# Patient Record
Sex: FEMALE | Race: WHITE | NOT HISPANIC OR LATINO | Employment: OTHER | URBAN - METROPOLITAN AREA
[De-identification: names, ages, dates, MRNs, and addresses within clinical notes are randomized per-mention and may not be internally consistent; named-entity substitution may affect disease eponyms.]

---

## 2017-01-22 ENCOUNTER — COMMUNICATION - HEALTHEAST (OUTPATIENT)
Dept: INTERNAL MEDICINE | Facility: CLINIC | Age: 72
End: 2017-01-22

## 2017-01-22 DIAGNOSIS — E78.5 HYPERLIPIDEMIA: ICD-10-CM

## 2017-01-31 ENCOUNTER — COMMUNICATION - HEALTHEAST (OUTPATIENT)
Dept: INTERNAL MEDICINE | Facility: CLINIC | Age: 72
End: 2017-01-31

## 2017-01-31 DIAGNOSIS — I10 HYPERTENSION: ICD-10-CM

## 2017-01-31 DIAGNOSIS — I25.10 CAD (CORONARY ARTERY DISEASE): ICD-10-CM

## 2017-02-01 ENCOUNTER — COMMUNICATION - HEALTHEAST (OUTPATIENT)
Dept: INTERNAL MEDICINE | Facility: CLINIC | Age: 72
End: 2017-02-01

## 2017-02-01 DIAGNOSIS — E55.9 VITAMIN D DEFICIENCY: ICD-10-CM

## 2017-02-01 DIAGNOSIS — E03.9 HYPOTHYROIDISM: ICD-10-CM

## 2017-02-01 DIAGNOSIS — E78.5 HYPERLIPEMIA: ICD-10-CM

## 2017-02-01 DIAGNOSIS — I10 ESSENTIAL HYPERTENSION: ICD-10-CM

## 2017-02-07 ENCOUNTER — AMBULATORY - HEALTHEAST (OUTPATIENT)
Dept: LAB | Facility: CLINIC | Age: 72
End: 2017-02-07

## 2017-02-07 ENCOUNTER — OFFICE VISIT - HEALTHEAST (OUTPATIENT)
Dept: INTERNAL MEDICINE | Facility: CLINIC | Age: 72
End: 2017-02-07

## 2017-02-07 DIAGNOSIS — Z78.0 MENOPAUSE: ICD-10-CM

## 2017-02-07 DIAGNOSIS — E03.9 HYPOTHYROIDISM: ICD-10-CM

## 2017-02-07 DIAGNOSIS — Z00.00 ROUTINE GENERAL MEDICAL EXAMINATION AT A HEALTH CARE FACILITY: ICD-10-CM

## 2017-02-07 DIAGNOSIS — E55.9 VITAMIN D DEFICIENCY: ICD-10-CM

## 2017-02-07 DIAGNOSIS — E78.5 HYPERLIPEMIA: ICD-10-CM

## 2017-02-07 DIAGNOSIS — I25.10 CORONARY ATHEROSCLEROSIS: ICD-10-CM

## 2017-02-07 DIAGNOSIS — Z12.31 VISIT FOR SCREENING MAMMOGRAM: ICD-10-CM

## 2017-02-07 DIAGNOSIS — I10 ESSENTIAL HYPERTENSION: ICD-10-CM

## 2017-02-07 DIAGNOSIS — N95.0 POSTMENOPAUSAL BLEEDING: ICD-10-CM

## 2017-02-07 LAB
CHOLEST SERPL-MCNC: 145 MG/DL
FASTING STATUS PATIENT QL REPORTED: YES
HDLC SERPL-MCNC: 61 MG/DL
LDLC SERPL CALC-MCNC: 72 MG/DL
TRIGL SERPL-MCNC: 59 MG/DL

## 2017-02-07 ASSESSMENT — MIFFLIN-ST. JEOR: SCORE: 1076.04

## 2017-02-08 ENCOUNTER — COMMUNICATION - HEALTHEAST (OUTPATIENT)
Dept: INTERNAL MEDICINE | Facility: CLINIC | Age: 72
End: 2017-02-08

## 2017-02-10 ENCOUNTER — COMMUNICATION - HEALTHEAST (OUTPATIENT)
Dept: INTERNAL MEDICINE | Facility: CLINIC | Age: 72
End: 2017-02-10

## 2017-02-16 ENCOUNTER — COMMUNICATION - HEALTHEAST (OUTPATIENT)
Dept: INTERNAL MEDICINE | Facility: CLINIC | Age: 72
End: 2017-02-16

## 2017-02-21 ENCOUNTER — COMMUNICATION - HEALTHEAST (OUTPATIENT)
Dept: INTERNAL MEDICINE | Facility: CLINIC | Age: 72
End: 2017-02-21

## 2017-02-21 DIAGNOSIS — Z00.00 HEALTH CARE MAINTENANCE: ICD-10-CM

## 2017-02-22 ENCOUNTER — COMMUNICATION - HEALTHEAST (OUTPATIENT)
Dept: INTERNAL MEDICINE | Facility: CLINIC | Age: 72
End: 2017-02-22

## 2017-02-22 DIAGNOSIS — E78.5 HYPERLIPIDEMIA: ICD-10-CM

## 2017-03-17 ENCOUNTER — COMMUNICATION - HEALTHEAST (OUTPATIENT)
Dept: INTERNAL MEDICINE | Facility: CLINIC | Age: 72
End: 2017-03-17

## 2017-03-17 DIAGNOSIS — E03.9 HYPOTHYROID: ICD-10-CM

## 2017-04-13 ENCOUNTER — RECORDS - HEALTHEAST (OUTPATIENT)
Dept: BONE DENSITY | Facility: CLINIC | Age: 72
End: 2017-04-13

## 2017-04-13 ENCOUNTER — HOSPITAL ENCOUNTER (OUTPATIENT)
Dept: MAMMOGRAPHY | Facility: CLINIC | Age: 72
Discharge: HOME | End: 2017-04-13
Attending: INTERNAL MEDICINE

## 2017-04-13 DIAGNOSIS — Z12.31 VISIT FOR SCREENING MAMMOGRAM: ICD-10-CM

## 2017-04-13 DIAGNOSIS — Z78.0 ASYMPTOMATIC MENOPAUSAL STATE: ICD-10-CM

## 2017-04-17 ENCOUNTER — RECORDS - HEALTHEAST (OUTPATIENT)
Dept: ADMINISTRATIVE | Facility: OTHER | Age: 72
End: 2017-04-17

## 2017-04-18 ENCOUNTER — COMMUNICATION - HEALTHEAST (OUTPATIENT)
Dept: INTERNAL MEDICINE | Facility: CLINIC | Age: 72
End: 2017-04-18

## 2017-05-05 ENCOUNTER — RECORDS - HEALTHEAST (OUTPATIENT)
Dept: ADMINISTRATIVE | Facility: OTHER | Age: 72
End: 2017-05-05

## 2017-06-06 ENCOUNTER — OFFICE VISIT - HEALTHEAST (OUTPATIENT)
Dept: CARDIOLOGY | Facility: CLINIC | Age: 72
End: 2017-06-06

## 2017-06-06 DIAGNOSIS — R06.09 OTHER FORMS OF DYSPNEA: ICD-10-CM

## 2017-06-06 DIAGNOSIS — R06.09 DYSPNEA ON EXERTION: ICD-10-CM

## 2017-06-06 ASSESSMENT — MIFFLIN-ST. JEOR: SCORE: 1079.21

## 2017-06-07 ENCOUNTER — COMMUNICATION - HEALTHEAST (OUTPATIENT)
Dept: INTERNAL MEDICINE | Facility: CLINIC | Age: 72
End: 2017-06-07

## 2017-06-09 ENCOUNTER — HOSPITAL ENCOUNTER (OUTPATIENT)
Dept: CARDIOLOGY | Facility: CLINIC | Age: 72
Discharge: HOME | End: 2017-06-09
Attending: INTERNAL MEDICINE

## 2017-06-09 ENCOUNTER — HOSPITAL ENCOUNTER (OUTPATIENT)
Dept: NUCLEAR MEDICINE | Facility: CLINIC | Age: 72
Discharge: HOME | End: 2017-06-09
Attending: INTERNAL MEDICINE

## 2017-06-09 LAB
CV STRESS CURRENT BP HE: NORMAL
CV STRESS CURRENT HR HE: 101
CV STRESS CURRENT HR HE: 103
CV STRESS CURRENT HR HE: 103
CV STRESS CURRENT HR HE: 109
CV STRESS CURRENT HR HE: 109
CV STRESS CURRENT HR HE: 115
CV STRESS CURRENT HR HE: 116
CV STRESS CURRENT HR HE: 118
CV STRESS CURRENT HR HE: 121
CV STRESS CURRENT HR HE: 128
CV STRESS CURRENT HR HE: 129
CV STRESS CURRENT HR HE: 132
CV STRESS CURRENT HR HE: 132
CV STRESS CURRENT HR HE: 134
CV STRESS CURRENT HR HE: 67
CV STRESS CURRENT HR HE: 72
CV STRESS CURRENT HR HE: 74
CV STRESS CURRENT HR HE: 76
CV STRESS CURRENT HR HE: 77
CV STRESS CURRENT HR HE: 77
CV STRESS CURRENT HR HE: 80
CV STRESS CURRENT HR HE: 81
CV STRESS CURRENT HR HE: 83
CV STRESS CURRENT HR HE: 83
CV STRESS CURRENT HR HE: 85
CV STRESS CURRENT HR HE: 87
CV STRESS CURRENT HR HE: 92
CV STRESS CURRENT HR HE: 96
CV STRESS CURRENT HR HE: 97
CV STRESS DEVIATION TIME HE: NORMAL
CV STRESS ECHO PERCENT HR HE: NORMAL
CV STRESS EXERCISE STAGE HE: NORMAL
CV STRESS EXERCISE STAGE REACHED HE: NORMAL
CV STRESS FINAL RESTING BP HE: NORMAL
CV STRESS FINAL RESTING HR HE: 74
CV STRESS MAX HR HE: 135
CV STRESS MAX TREADMILL GRADE HE: 16
CV STRESS MAX TREADMILL SPEED HE: 4.2
CV STRESS PEAK DIA BP HE: NORMAL
CV STRESS PEAK SYS BP HE: NORMAL
CV STRESS PHASE HE: NORMAL
CV STRESS PROTOCOL HE: NORMAL
CV STRESS RESTING PT POSITION HE: NORMAL
CV STRESS ST DEVIATION AMOUNT HE: NORMAL
CV STRESS ST DEVIATION ELEVATION HE: NORMAL
CV STRESS ST EVELATION AMOUNT HE: NORMAL
CV STRESS TEST TYPE HE: NORMAL
CV STRESS TOTAL STAGE TIME MIN 1 HE: NORMAL
NUC STRESS EJECTION FRACTION: 70 %
STRESS ECHO BASELINE BP: NORMAL
STRESS ECHO BASELINE HR: 69
STRESS ECHO CALCULATED PERCENT HR: 90 %
STRESS ECHO LAST STRESS BP: NORMAL
STRESS ECHO LAST STRESS HR: 134
STRESS ECHO POST ESTIMATED WORKLOAD: 12.1
STRESS ECHO POST EXERCISE DUR MIN: 10
STRESS ECHO POST EXERCISE DUR SEC: 38
STRESS ECHO TARGET HR: 128

## 2017-09-11 ENCOUNTER — COMMUNICATION - HEALTHEAST (OUTPATIENT)
Dept: INTERNAL MEDICINE | Facility: CLINIC | Age: 72
End: 2017-09-11

## 2017-09-11 DIAGNOSIS — B00.9 HERPES SIMPLEX WITHOUT MENTION OF COMPLICATION: ICD-10-CM

## 2017-11-13 ENCOUNTER — COMMUNICATION - HEALTHEAST (OUTPATIENT)
Dept: INTERNAL MEDICINE | Facility: CLINIC | Age: 72
End: 2017-11-13

## 2017-11-13 DIAGNOSIS — B00.9 HERPES SIMPLEX VIRUS (HSV) INFECTION: ICD-10-CM

## 2017-12-17 ENCOUNTER — COMMUNICATION - HEALTHEAST (OUTPATIENT)
Dept: INTERNAL MEDICINE | Facility: CLINIC | Age: 72
End: 2017-12-17

## 2017-12-17 DIAGNOSIS — B00.9 HERPES SIMPLEX VIRUS (HSV) INFECTION: ICD-10-CM

## 2017-12-18 ENCOUNTER — COMMUNICATION - HEALTHEAST (OUTPATIENT)
Dept: INTERNAL MEDICINE | Facility: CLINIC | Age: 72
End: 2017-12-18

## 2017-12-18 DIAGNOSIS — Z00.00 HEALTH CARE MAINTENANCE: ICD-10-CM

## 2018-01-24 ENCOUNTER — COMMUNICATION - HEALTHEAST (OUTPATIENT)
Dept: INTERNAL MEDICINE | Facility: CLINIC | Age: 73
End: 2018-01-24

## 2018-01-24 DIAGNOSIS — I25.10 CAD (CORONARY ARTERY DISEASE): ICD-10-CM

## 2018-01-24 DIAGNOSIS — I10 HYPERTENSION: ICD-10-CM

## 2018-02-07 ENCOUNTER — COMMUNICATION - HEALTHEAST (OUTPATIENT)
Dept: INTERNAL MEDICINE | Facility: CLINIC | Age: 73
End: 2018-02-07

## 2018-02-07 DIAGNOSIS — E78.5 HYPERLIPIDEMIA: ICD-10-CM

## 2018-03-11 ENCOUNTER — COMMUNICATION - HEALTHEAST (OUTPATIENT)
Dept: INTERNAL MEDICINE | Facility: CLINIC | Age: 73
End: 2018-03-11

## 2018-03-11 DIAGNOSIS — E03.9 HYPOTHYROID: ICD-10-CM

## 2018-04-28 ENCOUNTER — COMMUNICATION - HEALTHEAST (OUTPATIENT)
Dept: INTERNAL MEDICINE | Facility: CLINIC | Age: 73
End: 2018-04-28

## 2018-04-28 DIAGNOSIS — I10 HYPERTENSION: ICD-10-CM

## 2018-04-28 DIAGNOSIS — I25.10 CAD (CORONARY ARTERY DISEASE): ICD-10-CM

## 2018-05-09 ENCOUNTER — RECORDS - HEALTHEAST (OUTPATIENT)
Dept: ADMINISTRATIVE | Facility: OTHER | Age: 73
End: 2018-05-09

## 2018-05-15 ENCOUNTER — RECORDS - HEALTHEAST (OUTPATIENT)
Dept: ADMINISTRATIVE | Facility: OTHER | Age: 73
End: 2018-05-15

## 2018-05-24 ENCOUNTER — RECORDS - HEALTHEAST (OUTPATIENT)
Dept: GENERAL RADIOLOGY | Facility: CLINIC | Age: 73
End: 2018-05-24

## 2018-05-24 ENCOUNTER — OFFICE VISIT - HEALTHEAST (OUTPATIENT)
Dept: INTERNAL MEDICINE | Facility: CLINIC | Age: 73
End: 2018-05-24

## 2018-05-24 DIAGNOSIS — I10 ESSENTIAL HYPERTENSION: ICD-10-CM

## 2018-05-24 DIAGNOSIS — M94.9 DISORDER OF BONE AND CARTILAGE: ICD-10-CM

## 2018-05-24 DIAGNOSIS — I25.10 CORONARY ATHEROSCLEROSIS: ICD-10-CM

## 2018-05-24 DIAGNOSIS — M54.50 LOW BACK PAIN: ICD-10-CM

## 2018-05-24 DIAGNOSIS — M89.9 DISORDER OF BONE AND CARTILAGE: ICD-10-CM

## 2018-05-24 DIAGNOSIS — E03.9 HYPOTHYROIDISM: ICD-10-CM

## 2018-05-24 DIAGNOSIS — Z00.00 ROUTINE GENERAL MEDICAL EXAMINATION AT A HEALTH CARE FACILITY: ICD-10-CM

## 2018-05-24 DIAGNOSIS — E55.9 VITAMIN D DEFICIENCY: ICD-10-CM

## 2018-05-24 DIAGNOSIS — E78.5 HYPERLIPEMIA: ICD-10-CM

## 2018-05-24 LAB
ALBUMIN SERPL-MCNC: 3.9 G/DL (ref 3.5–5)
ALP SERPL-CCNC: 70 U/L (ref 45–120)
ALT SERPL W P-5'-P-CCNC: 18 U/L (ref 0–45)
ANION GAP SERPL CALCULATED.3IONS-SCNC: 13 MMOL/L (ref 5–18)
AST SERPL W P-5'-P-CCNC: 26 U/L (ref 0–40)
BILIRUB SERPL-MCNC: 0.8 MG/DL (ref 0–1)
BUN SERPL-MCNC: 21 MG/DL (ref 8–28)
CALCIUM SERPL-MCNC: 10.3 MG/DL (ref 8.5–10.5)
CHLORIDE BLD-SCNC: 103 MMOL/L (ref 98–107)
CHOLEST SERPL-MCNC: 153 MG/DL
CO2 SERPL-SCNC: 26 MMOL/L (ref 22–31)
CREAT SERPL-MCNC: 1.03 MG/DL (ref 0.6–1.1)
FASTING STATUS PATIENT QL REPORTED: YES
GFR SERPL CREATININE-BSD FRML MDRD: 53 ML/MIN/1.73M2
GLUCOSE BLD-MCNC: 80 MG/DL (ref 70–125)
HDLC SERPL-MCNC: 61 MG/DL
LDLC SERPL CALC-MCNC: 79 MG/DL
POTASSIUM BLD-SCNC: 4.9 MMOL/L (ref 3.5–5)
PROT SERPL-MCNC: 7.2 G/DL (ref 6–8)
SODIUM SERPL-SCNC: 142 MMOL/L (ref 136–145)
TRIGL SERPL-MCNC: 64 MG/DL
TSH SERPL DL<=0.005 MIU/L-ACNC: 0.7 UIU/ML (ref 0.3–5)

## 2018-05-24 RX ORDER — ZINC GLUCONATE 50 MG
50 TABLET ORAL DAILY
Status: SHIPPED | COMMUNITY
Start: 2018-05-24 | End: 2023-01-12

## 2018-05-24 ASSESSMENT — MIFFLIN-ST. JEOR: SCORE: 1070.14

## 2018-05-25 LAB
25(OH)D3 SERPL-MCNC: 69.5 NG/ML (ref 30–80)
25(OH)D3 SERPL-MCNC: 69.5 NG/ML (ref 30–80)

## 2018-05-29 ENCOUNTER — COMMUNICATION - HEALTHEAST (OUTPATIENT)
Dept: INTERNAL MEDICINE | Facility: CLINIC | Age: 73
End: 2018-05-29

## 2018-05-30 ENCOUNTER — COMMUNICATION - HEALTHEAST (OUTPATIENT)
Dept: INTERNAL MEDICINE | Facility: CLINIC | Age: 73
End: 2018-05-30

## 2018-05-30 DIAGNOSIS — M85.80 OSTEOPENIA: ICD-10-CM

## 2018-05-31 ENCOUNTER — COMMUNICATION - HEALTHEAST (OUTPATIENT)
Dept: ADMINISTRATIVE | Facility: CLINIC | Age: 73
End: 2018-05-31

## 2018-06-13 ENCOUNTER — COMMUNICATION - HEALTHEAST (OUTPATIENT)
Dept: INTERNAL MEDICINE | Facility: CLINIC | Age: 73
End: 2018-06-13

## 2018-06-15 ENCOUNTER — COMMUNICATION - HEALTHEAST (OUTPATIENT)
Dept: INTERNAL MEDICINE | Facility: CLINIC | Age: 73
End: 2018-06-15

## 2018-06-15 DIAGNOSIS — E03.9 HYPOTHYROID: ICD-10-CM

## 2018-07-05 ENCOUNTER — OFFICE VISIT - HEALTHEAST (OUTPATIENT)
Dept: CARDIOLOGY | Facility: CLINIC | Age: 73
End: 2018-07-05

## 2018-07-05 DIAGNOSIS — I25.10 CORONARY ARTERY DISEASE INVOLVING NATIVE CORONARY ARTERY OF NATIVE HEART WITHOUT ANGINA PECTORIS: ICD-10-CM

## 2018-07-05 ASSESSMENT — MIFFLIN-ST. JEOR: SCORE: 1083.75

## 2018-07-25 ENCOUNTER — COMMUNICATION - HEALTHEAST (OUTPATIENT)
Dept: INTERNAL MEDICINE | Facility: CLINIC | Age: 73
End: 2018-07-25

## 2018-07-25 DIAGNOSIS — I25.10 CAD (CORONARY ARTERY DISEASE): ICD-10-CM

## 2018-07-25 DIAGNOSIS — I10 HYPERTENSION: ICD-10-CM

## 2018-08-09 ENCOUNTER — COMMUNICATION - HEALTHEAST (OUTPATIENT)
Dept: INTERNAL MEDICINE | Facility: CLINIC | Age: 73
End: 2018-08-09

## 2018-10-16 ENCOUNTER — COMMUNICATION - HEALTHEAST (OUTPATIENT)
Dept: INTERNAL MEDICINE | Facility: CLINIC | Age: 73
End: 2018-10-16

## 2018-11-15 ENCOUNTER — RECORDS - HEALTHEAST (OUTPATIENT)
Dept: ADMINISTRATIVE | Facility: OTHER | Age: 73
End: 2018-11-15

## 2018-12-04 ENCOUNTER — OFFICE VISIT - HEALTHEAST (OUTPATIENT)
Dept: INTERNAL MEDICINE | Facility: CLINIC | Age: 73
End: 2018-12-04

## 2018-12-04 DIAGNOSIS — M89.9 DISORDER OF BONE AND CARTILAGE: ICD-10-CM

## 2018-12-04 DIAGNOSIS — M94.9 DISORDER OF BONE AND CARTILAGE: ICD-10-CM

## 2018-12-04 ASSESSMENT — MIFFLIN-ST. JEOR: SCORE: 1085.68

## 2018-12-26 ENCOUNTER — HOSPITAL ENCOUNTER (OUTPATIENT)
Dept: MAMMOGRAPHY | Facility: CLINIC | Age: 73
Discharge: HOME | End: 2018-12-26
Attending: INTERNAL MEDICINE

## 2018-12-26 DIAGNOSIS — Z12.31 VISIT FOR SCREENING MAMMOGRAM: ICD-10-CM

## 2019-01-14 ENCOUNTER — COMMUNICATION - HEALTHEAST (OUTPATIENT)
Dept: INTERNAL MEDICINE | Facility: CLINIC | Age: 74
End: 2019-01-14

## 2019-01-14 DIAGNOSIS — B00.9 HERPES SIMPLEX VIRUS (HSV) INFECTION: ICD-10-CM

## 2019-01-28 ENCOUNTER — COMMUNICATION - HEALTHEAST (OUTPATIENT)
Dept: INTERNAL MEDICINE | Facility: CLINIC | Age: 74
End: 2019-01-28

## 2019-01-29 ENCOUNTER — AMBULATORY - HEALTHEAST (OUTPATIENT)
Dept: INTERNAL MEDICINE | Facility: CLINIC | Age: 74
End: 2019-01-29

## 2019-01-29 DIAGNOSIS — N95.0 POST-MENOPAUSAL BLEEDING: ICD-10-CM

## 2019-02-01 ENCOUNTER — HOSPITAL ENCOUNTER (OUTPATIENT)
Dept: ULTRASOUND IMAGING | Facility: CLINIC | Age: 74
Discharge: HOME | End: 2019-02-01
Attending: INTERNAL MEDICINE

## 2019-02-01 DIAGNOSIS — N95.0 POST-MENOPAUSAL BLEEDING: ICD-10-CM

## 2019-02-05 ENCOUNTER — COMMUNICATION - HEALTHEAST (OUTPATIENT)
Dept: INTERNAL MEDICINE | Facility: CLINIC | Age: 74
End: 2019-02-05

## 2019-02-05 DIAGNOSIS — N95.0 POST-MENOPAUSAL BLEEDING: ICD-10-CM

## 2019-02-18 ENCOUNTER — COMMUNICATION - HEALTHEAST (OUTPATIENT)
Dept: INTERNAL MEDICINE | Facility: CLINIC | Age: 74
End: 2019-02-18

## 2019-02-18 DIAGNOSIS — E78.5 HYPERLIPIDEMIA: ICD-10-CM

## 2019-02-19 ENCOUNTER — COMMUNICATION - HEALTHEAST (OUTPATIENT)
Dept: INTERNAL MEDICINE | Facility: CLINIC | Age: 74
End: 2019-02-19

## 2019-02-19 DIAGNOSIS — F32.A DEPRESSION: ICD-10-CM

## 2019-05-20 ENCOUNTER — COMMUNICATION - HEALTHEAST (OUTPATIENT)
Dept: INTERNAL MEDICINE | Facility: CLINIC | Age: 74
End: 2019-05-20

## 2019-05-20 DIAGNOSIS — E78.5 HYPERLIPIDEMIA: ICD-10-CM

## 2019-05-30 ENCOUNTER — OFFICE VISIT - HEALTHEAST (OUTPATIENT)
Dept: INTERNAL MEDICINE | Facility: CLINIC | Age: 74
End: 2019-05-30

## 2019-05-30 DIAGNOSIS — M94.9 DISORDER OF BONE AND CARTILAGE: ICD-10-CM

## 2019-05-30 DIAGNOSIS — M89.9 DISORDER OF BONE AND CARTILAGE: ICD-10-CM

## 2019-06-03 ENCOUNTER — COMMUNICATION - HEALTHEAST (OUTPATIENT)
Dept: INTERNAL MEDICINE | Facility: CLINIC | Age: 74
End: 2019-06-03

## 2019-06-03 DIAGNOSIS — F32.A DEPRESSION: ICD-10-CM

## 2019-06-17 ENCOUNTER — COMMUNICATION - HEALTHEAST (OUTPATIENT)
Dept: INTERNAL MEDICINE | Facility: CLINIC | Age: 74
End: 2019-06-17

## 2019-06-17 ENCOUNTER — OFFICE VISIT - HEALTHEAST (OUTPATIENT)
Dept: INTERNAL MEDICINE | Facility: CLINIC | Age: 74
End: 2019-06-17

## 2019-06-17 DIAGNOSIS — E03.9 HYPOTHYROID: ICD-10-CM

## 2019-06-17 DIAGNOSIS — I10 ESSENTIAL HYPERTENSION: ICD-10-CM

## 2019-06-17 DIAGNOSIS — M94.9 DISORDER OF BONE AND CARTILAGE: ICD-10-CM

## 2019-06-17 DIAGNOSIS — M89.9 DISORDER OF BONE AND CARTILAGE: ICD-10-CM

## 2019-06-17 DIAGNOSIS — I25.10 ATHEROSCLEROSIS OF NATIVE CORONARY ARTERY OF NATIVE HEART WITHOUT ANGINA PECTORIS: ICD-10-CM

## 2019-06-17 DIAGNOSIS — E78.5 HYPERLIPIDEMIA, UNSPECIFIED HYPERLIPIDEMIA TYPE: ICD-10-CM

## 2019-06-17 ASSESSMENT — MIFFLIN-ST. JEOR: SCORE: 1066.74

## 2019-06-25 ENCOUNTER — AMBULATORY - HEALTHEAST (OUTPATIENT)
Dept: LAB | Facility: CLINIC | Age: 74
End: 2019-06-25

## 2019-06-25 DIAGNOSIS — E78.5 HYPERLIPIDEMIA, UNSPECIFIED HYPERLIPIDEMIA TYPE: ICD-10-CM

## 2019-06-25 DIAGNOSIS — E03.9 HYPOTHYROID: ICD-10-CM

## 2019-06-25 DIAGNOSIS — I10 ESSENTIAL HYPERTENSION: ICD-10-CM

## 2019-06-25 LAB
ANION GAP SERPL CALCULATED.3IONS-SCNC: 10 MMOL/L (ref 5–18)
BUN SERPL-MCNC: 18 MG/DL (ref 8–28)
CALCIUM SERPL-MCNC: 10.2 MG/DL (ref 8.5–10.5)
CHLORIDE BLD-SCNC: 103 MMOL/L (ref 98–107)
CHOLEST SERPL-MCNC: 165 MG/DL
CO2 SERPL-SCNC: 27 MMOL/L (ref 22–31)
CREAT SERPL-MCNC: 0.86 MG/DL (ref 0.6–1.1)
ERYTHROCYTE [DISTWIDTH] IN BLOOD BY AUTOMATED COUNT: 11.2 % (ref 11–14.5)
FASTING STATUS PATIENT QL REPORTED: YES
GFR SERPL CREATININE-BSD FRML MDRD: >60 ML/MIN/1.73M2
GLUCOSE BLD-MCNC: 102 MG/DL (ref 70–125)
HCT VFR BLD AUTO: 41.7 % (ref 35–47)
HDLC SERPL-MCNC: 68 MG/DL
HGB BLD-MCNC: 14.2 G/DL (ref 12–16)
LDLC SERPL CALC-MCNC: 82 MG/DL
MCH RBC QN AUTO: 32.3 PG (ref 27–34)
MCHC RBC AUTO-ENTMCNC: 34 G/DL (ref 32–36)
MCV RBC AUTO: 95 FL (ref 80–100)
PLATELET # BLD AUTO: 232 THOU/UL (ref 140–440)
PMV BLD AUTO: 7.8 FL (ref 7–10)
POTASSIUM BLD-SCNC: 4.6 MMOL/L (ref 3.5–5)
RBC # BLD AUTO: 4.38 MILL/UL (ref 3.8–5.4)
SODIUM SERPL-SCNC: 140 MMOL/L (ref 136–145)
TRIGL SERPL-MCNC: 74 MG/DL
TSH SERPL DL<=0.005 MIU/L-ACNC: 1.26 UIU/ML (ref 0.3–5)
WBC: 4.5 THOU/UL (ref 4–11)

## 2019-06-27 ENCOUNTER — RECORDS - HEALTHEAST (OUTPATIENT)
Dept: BONE DENSITY | Facility: CLINIC | Age: 74
End: 2019-06-27

## 2019-06-27 ENCOUNTER — RECORDS - HEALTHEAST (OUTPATIENT)
Dept: ADMINISTRATIVE | Facility: OTHER | Age: 74
End: 2019-06-27

## 2019-06-27 DIAGNOSIS — M94.9 DISORDER OF CARTILAGE, UNSPECIFIED: ICD-10-CM

## 2019-06-27 DIAGNOSIS — M89.9 DISORDER OF BONE, UNSPECIFIED: ICD-10-CM

## 2019-08-01 ENCOUNTER — COMMUNICATION - HEALTHEAST (OUTPATIENT)
Dept: INTERNAL MEDICINE | Facility: CLINIC | Age: 74
End: 2019-08-01

## 2019-08-01 DIAGNOSIS — I25.10 CAD (CORONARY ARTERY DISEASE): ICD-10-CM

## 2019-08-01 DIAGNOSIS — I10 HYPERTENSION: ICD-10-CM

## 2019-08-02 ENCOUNTER — COMMUNICATION - HEALTHEAST (OUTPATIENT)
Dept: CARDIOLOGY | Facility: CLINIC | Age: 74
End: 2019-08-02

## 2019-08-22 ENCOUNTER — OFFICE VISIT - HEALTHEAST (OUTPATIENT)
Dept: CARDIOLOGY | Facility: CLINIC | Age: 74
End: 2019-08-22

## 2019-08-22 DIAGNOSIS — I25.10 CORONARY ARTERY DISEASE INVOLVING NATIVE CORONARY ARTERY OF NATIVE HEART WITHOUT ANGINA PECTORIS: ICD-10-CM

## 2019-08-22 ASSESSMENT — MIFFLIN-ST. JEOR: SCORE: 1071.27

## 2019-09-19 ENCOUNTER — COMMUNICATION - HEALTHEAST (OUTPATIENT)
Dept: INTERNAL MEDICINE | Facility: CLINIC | Age: 74
End: 2019-09-19

## 2019-09-19 DIAGNOSIS — M25.551 HIP PAIN, RIGHT: ICD-10-CM

## 2019-09-20 ENCOUNTER — COMMUNICATION - HEALTHEAST (OUTPATIENT)
Dept: SCHEDULING | Facility: CLINIC | Age: 74
End: 2019-09-20

## 2019-09-20 ENCOUNTER — COMMUNICATION - HEALTHEAST (OUTPATIENT)
Dept: INTERNAL MEDICINE | Facility: CLINIC | Age: 74
End: 2019-09-20

## 2019-10-10 ENCOUNTER — RECORDS - HEALTHEAST (OUTPATIENT)
Dept: ADMINISTRATIVE | Facility: OTHER | Age: 74
End: 2019-10-10

## 2019-10-22 ENCOUNTER — COMMUNICATION - HEALTHEAST (OUTPATIENT)
Dept: INTERNAL MEDICINE | Facility: CLINIC | Age: 74
End: 2019-10-22

## 2019-10-24 ENCOUNTER — RECORDS - HEALTHEAST (OUTPATIENT)
Dept: ADMINISTRATIVE | Facility: OTHER | Age: 74
End: 2019-10-24

## 2019-10-28 ENCOUNTER — OFFICE VISIT - HEALTHEAST (OUTPATIENT)
Dept: INTERNAL MEDICINE | Facility: CLINIC | Age: 74
End: 2019-10-28

## 2019-10-28 DIAGNOSIS — I25.10 ATHEROSCLEROSIS OF CORONARY ARTERY OF NATIVE HEART WITHOUT ANGINA PECTORIS, UNSPECIFIED VESSEL OR LESION TYPE: ICD-10-CM

## 2019-10-28 DIAGNOSIS — E78.5 HYPERLIPIDEMIA, UNSPECIFIED HYPERLIPIDEMIA TYPE: ICD-10-CM

## 2019-10-28 DIAGNOSIS — I10 ESSENTIAL HYPERTENSION: ICD-10-CM

## 2019-10-28 DIAGNOSIS — Z01.818 PREOP GENERAL PHYSICAL EXAM: ICD-10-CM

## 2019-10-28 DIAGNOSIS — M16.10 HIP ARTHRITIS: ICD-10-CM

## 2019-10-28 LAB
ANION GAP SERPL CALCULATED.3IONS-SCNC: 9 MMOL/L (ref 5–18)
ATRIAL RATE - MUSE: 61 BPM
BUN SERPL-MCNC: 17 MG/DL (ref 8–28)
CALCIUM SERPL-MCNC: 9.9 MG/DL (ref 8.5–10.5)
CHLORIDE BLD-SCNC: 104 MMOL/L (ref 98–107)
CO2 SERPL-SCNC: 27 MMOL/L (ref 22–31)
CREAT SERPL-MCNC: 0.88 MG/DL (ref 0.6–1.1)
DIASTOLIC BLOOD PRESSURE - MUSE: NORMAL
ERYTHROCYTE [DISTWIDTH] IN BLOOD BY AUTOMATED COUNT: 12 % (ref 11–14.5)
GFR SERPL CREATININE-BSD FRML MDRD: >60 ML/MIN/1.73M2
GLUCOSE BLD-MCNC: 101 MG/DL (ref 70–125)
HCT VFR BLD AUTO: 39.1 % (ref 35–47)
HGB BLD-MCNC: 13.6 G/DL (ref 12–16)
INTERPRETATION ECG - MUSE: NORMAL
MCH RBC QN AUTO: 32.5 PG (ref 27–34)
MCHC RBC AUTO-ENTMCNC: 34.7 G/DL (ref 32–36)
MCV RBC AUTO: 94 FL (ref 80–100)
P AXIS - MUSE: 60 DEGREES
PLATELET # BLD AUTO: 267 THOU/UL (ref 140–440)
PMV BLD AUTO: 7.6 FL (ref 7–10)
POTASSIUM BLD-SCNC: 4.5 MMOL/L (ref 3.5–5)
PR INTERVAL - MUSE: 180 MS
QRS DURATION - MUSE: 68 MS
QT - MUSE: 396 MS
QTC - MUSE: 398 MS
R AXIS - MUSE: 29 DEGREES
RBC # BLD AUTO: 4.17 MILL/UL (ref 3.8–5.4)
SODIUM SERPL-SCNC: 140 MMOL/L (ref 136–145)
SYSTOLIC BLOOD PRESSURE - MUSE: NORMAL
T AXIS - MUSE: 59 DEGREES
VENTRICULAR RATE- MUSE: 61 BPM
WBC: 4.1 THOU/UL (ref 4–11)

## 2019-10-28 ASSESSMENT — MIFFLIN-ST. JEOR: SCORE: 1075.81

## 2019-10-29 ENCOUNTER — COMMUNICATION - HEALTHEAST (OUTPATIENT)
Dept: INTERNAL MEDICINE | Facility: CLINIC | Age: 74
End: 2019-10-29

## 2019-11-08 ENCOUNTER — COMMUNICATION - HEALTHEAST (OUTPATIENT)
Dept: CARDIOLOGY | Facility: CLINIC | Age: 74
End: 2019-11-08

## 2019-11-12 ENCOUNTER — AMBULATORY - HEALTHEAST (OUTPATIENT)
Dept: OTHER | Facility: CLINIC | Age: 74
End: 2019-11-12

## 2019-11-12 ENCOUNTER — SURGERY - HEALTHEAST (OUTPATIENT)
Dept: SURGERY | Facility: CLINIC | Age: 74
End: 2019-11-12

## 2019-11-12 ENCOUNTER — ANESTHESIA - HEALTHEAST (OUTPATIENT)
Dept: SURGERY | Facility: CLINIC | Age: 74
End: 2019-11-12

## 2019-11-12 ASSESSMENT — MIFFLIN-ST. JEOR: SCORE: 1062.2

## 2019-11-25 ENCOUNTER — RECORDS - HEALTHEAST (OUTPATIENT)
Dept: ADMINISTRATIVE | Facility: OTHER | Age: 74
End: 2019-11-25

## 2019-11-26 ENCOUNTER — COMMUNICATION - HEALTHEAST (OUTPATIENT)
Dept: INTERNAL MEDICINE | Facility: CLINIC | Age: 74
End: 2019-11-26

## 2019-11-26 DIAGNOSIS — E78.5 HYPERLIPIDEMIA: ICD-10-CM

## 2019-12-02 ENCOUNTER — COMMUNICATION - HEALTHEAST (OUTPATIENT)
Dept: INTERNAL MEDICINE | Facility: CLINIC | Age: 74
End: 2019-12-02

## 2020-01-03 ENCOUNTER — HOSPITAL ENCOUNTER (OUTPATIENT)
Dept: CT IMAGING | Facility: CLINIC | Age: 75
Discharge: HOME | End: 2020-01-03
Attending: ORTHOPAEDIC SURGERY

## 2020-01-03 ENCOUNTER — RECORDS - HEALTHEAST (OUTPATIENT)
Dept: ADMINISTRATIVE | Facility: OTHER | Age: 75
End: 2020-01-03

## 2020-01-03 DIAGNOSIS — Z96.649 S/P TOTAL HIP ARTHROPLASTY: ICD-10-CM

## 2020-01-17 ENCOUNTER — RECORDS - HEALTHEAST (OUTPATIENT)
Dept: ADMINISTRATIVE | Facility: OTHER | Age: 75
End: 2020-01-17

## 2020-01-24 ENCOUNTER — COMMUNICATION - HEALTHEAST (OUTPATIENT)
Dept: INTERNAL MEDICINE | Facility: CLINIC | Age: 75
End: 2020-01-24

## 2020-01-27 ENCOUNTER — COMMUNICATION - HEALTHEAST (OUTPATIENT)
Dept: INTERNAL MEDICINE | Facility: CLINIC | Age: 75
End: 2020-01-27

## 2020-01-27 DIAGNOSIS — B00.9 HERPES SIMPLEX VIRUS (HSV) INFECTION: ICD-10-CM

## 2020-01-31 ENCOUNTER — RECORDS - HEALTHEAST (OUTPATIENT)
Dept: ADMINISTRATIVE | Facility: OTHER | Age: 75
End: 2020-01-31

## 2020-02-03 ENCOUNTER — COMMUNICATION - HEALTHEAST (OUTPATIENT)
Dept: SCHEDULING | Facility: CLINIC | Age: 75
End: 2020-02-03

## 2020-02-03 ENCOUNTER — OFFICE VISIT - HEALTHEAST (OUTPATIENT)
Dept: FAMILY MEDICINE | Facility: CLINIC | Age: 75
End: 2020-02-03

## 2020-02-03 DIAGNOSIS — R68.89 FLU-LIKE SYMPTOMS: ICD-10-CM

## 2020-02-03 DIAGNOSIS — J10.1 INFLUENZA A: ICD-10-CM

## 2020-02-03 LAB
FLUAV AG SPEC QL IA: ABNORMAL
FLUBV AG SPEC QL IA: ABNORMAL

## 2020-02-05 ENCOUNTER — COMMUNICATION - HEALTHEAST (OUTPATIENT)
Dept: INTERNAL MEDICINE | Facility: CLINIC | Age: 75
End: 2020-02-05

## 2020-02-06 ENCOUNTER — COMMUNICATION - HEALTHEAST (OUTPATIENT)
Dept: INTERNAL MEDICINE | Facility: CLINIC | Age: 75
End: 2020-02-06

## 2020-02-27 ENCOUNTER — RECORDS - HEALTHEAST (OUTPATIENT)
Dept: ADMINISTRATIVE | Facility: OTHER | Age: 75
End: 2020-02-27

## 2020-03-05 ENCOUNTER — OFFICE VISIT - HEALTHEAST (OUTPATIENT)
Dept: INTERNAL MEDICINE | Facility: CLINIC | Age: 75
End: 2020-03-05

## 2020-03-05 DIAGNOSIS — R05.9 COUGH: ICD-10-CM

## 2020-03-05 DIAGNOSIS — R93.89 ABNORMAL CHEST X-RAY: ICD-10-CM

## 2020-03-05 ASSESSMENT — MIFFLIN-ST. JEOR: SCORE: 1089.42

## 2020-03-05 ASSESSMENT — PATIENT HEALTH QUESTIONNAIRE - PHQ9: SUM OF ALL RESPONSES TO PHQ QUESTIONS 1-9: 0

## 2020-03-06 ENCOUNTER — AMBULATORY - HEALTHEAST (OUTPATIENT)
Dept: PULMONOLOGY | Facility: OTHER | Age: 75
End: 2020-03-06

## 2020-03-06 DIAGNOSIS — R05.9 COUGH: ICD-10-CM

## 2020-03-10 ENCOUNTER — COMMUNICATION - HEALTHEAST (OUTPATIENT)
Dept: PULMONOLOGY | Facility: OTHER | Age: 75
End: 2020-03-10

## 2020-04-02 ENCOUNTER — COMMUNICATION - HEALTHEAST (OUTPATIENT)
Dept: PULMONOLOGY | Facility: OTHER | Age: 75
End: 2020-04-02

## 2020-05-05 ENCOUNTER — COMMUNICATION - HEALTHEAST (OUTPATIENT)
Dept: INTERNAL MEDICINE | Facility: CLINIC | Age: 75
End: 2020-05-05

## 2020-05-05 DIAGNOSIS — I25.10 CAD (CORONARY ARTERY DISEASE): ICD-10-CM

## 2020-05-05 DIAGNOSIS — I10 HYPERTENSION: ICD-10-CM

## 2020-05-26 ENCOUNTER — COMMUNICATION - HEALTHEAST (OUTPATIENT)
Dept: INTERNAL MEDICINE | Facility: CLINIC | Age: 75
End: 2020-05-26

## 2020-05-26 DIAGNOSIS — F32.A DEPRESSION: ICD-10-CM

## 2020-06-18 ENCOUNTER — OFFICE VISIT - HEALTHEAST (OUTPATIENT)
Dept: INTERNAL MEDICINE | Facility: CLINIC | Age: 75
End: 2020-06-18

## 2020-06-18 DIAGNOSIS — Z96.641 STATUS POST HIP REPLACEMENT, RIGHT: ICD-10-CM

## 2020-06-18 DIAGNOSIS — E78.5 HYPERLIPIDEMIA, UNSPECIFIED HYPERLIPIDEMIA TYPE: ICD-10-CM

## 2020-06-18 DIAGNOSIS — I10 ESSENTIAL HYPERTENSION: ICD-10-CM

## 2020-06-30 ENCOUNTER — COMMUNICATION - HEALTHEAST (OUTPATIENT)
Dept: INTERNAL MEDICINE | Facility: CLINIC | Age: 75
End: 2020-06-30

## 2020-07-27 ENCOUNTER — COMMUNICATION - HEALTHEAST (OUTPATIENT)
Dept: PULMONOLOGY | Facility: OTHER | Age: 75
End: 2020-07-27

## 2020-08-12 ENCOUNTER — COMMUNICATION - HEALTHEAST (OUTPATIENT)
Dept: PULMONOLOGY | Facility: OTHER | Age: 75
End: 2020-08-12

## 2020-09-10 ENCOUNTER — COMMUNICATION - HEALTHEAST (OUTPATIENT)
Dept: INTERNAL MEDICINE | Facility: CLINIC | Age: 75
End: 2020-09-10

## 2020-09-10 DIAGNOSIS — E03.9 HYPOTHYROID: ICD-10-CM

## 2020-09-10 RX ORDER — LEVOTHYROXINE SODIUM 75 UG/1
75 TABLET ORAL DAILY
Qty: 90 TABLET | Refills: 3 | Status: SHIPPED | OUTPATIENT
Start: 2020-09-10 | End: 2021-09-13

## 2020-10-08 ENCOUNTER — OFFICE VISIT - HEALTHEAST (OUTPATIENT)
Dept: INTERNAL MEDICINE | Facility: CLINIC | Age: 75
End: 2020-10-08

## 2020-10-08 DIAGNOSIS — Z12.31 VISIT FOR SCREENING MAMMOGRAM: ICD-10-CM

## 2020-10-08 DIAGNOSIS — I10 ESSENTIAL HYPERTENSION: ICD-10-CM

## 2020-10-08 DIAGNOSIS — E03.9 ACQUIRED HYPOTHYROIDISM: ICD-10-CM

## 2020-10-08 DIAGNOSIS — E78.5 HYPERLIPIDEMIA, UNSPECIFIED HYPERLIPIDEMIA TYPE: ICD-10-CM

## 2020-10-08 DIAGNOSIS — Z00.00 WELLNESS EXAMINATION: ICD-10-CM

## 2020-10-08 LAB
ALBUMIN SERPL-MCNC: 3.7 G/DL (ref 3.5–5)
ALP SERPL-CCNC: 77 U/L (ref 45–120)
ALT SERPL W P-5'-P-CCNC: 19 U/L (ref 0–45)
ANION GAP SERPL CALCULATED.3IONS-SCNC: 7 MMOL/L (ref 5–18)
AST SERPL W P-5'-P-CCNC: 26 U/L (ref 0–40)
BILIRUB SERPL-MCNC: 0.8 MG/DL (ref 0–1)
BUN SERPL-MCNC: 18 MG/DL (ref 8–28)
CALCIUM SERPL-MCNC: 9.9 MG/DL (ref 8.5–10.5)
CHLORIDE BLD-SCNC: 103 MMOL/L (ref 98–107)
CHOLEST SERPL-MCNC: 138 MG/DL
CO2 SERPL-SCNC: 30 MMOL/L (ref 22–31)
CREAT SERPL-MCNC: 0.87 MG/DL (ref 0.6–1.1)
ERYTHROCYTE [DISTWIDTH] IN BLOOD BY AUTOMATED COUNT: 11.7 % (ref 11–14.5)
FASTING STATUS PATIENT QL REPORTED: YES
GFR SERPL CREATININE-BSD FRML MDRD: >60 ML/MIN/1.73M2
GLUCOSE BLD-MCNC: 106 MG/DL (ref 70–125)
HCT VFR BLD AUTO: 43.3 % (ref 35–47)
HDLC SERPL-MCNC: 65 MG/DL
HGB BLD-MCNC: 14.4 G/DL (ref 12–16)
LDLC SERPL CALC-MCNC: 61 MG/DL
MCH RBC QN AUTO: 32.1 PG (ref 27–34)
MCHC RBC AUTO-ENTMCNC: 33.2 G/DL (ref 32–36)
MCV RBC AUTO: 97 FL (ref 80–100)
PLATELET # BLD AUTO: 254 THOU/UL (ref 140–440)
PMV BLD AUTO: 8.1 FL (ref 7–10)
POTASSIUM BLD-SCNC: 4.6 MMOL/L (ref 3.5–5)
PROT SERPL-MCNC: 7.6 G/DL (ref 6–8)
RBC # BLD AUTO: 4.48 MILL/UL (ref 3.8–5.4)
SODIUM SERPL-SCNC: 140 MMOL/L (ref 136–145)
TRIGL SERPL-MCNC: 59 MG/DL
TSH SERPL DL<=0.005 MIU/L-ACNC: 1.14 UIU/ML (ref 0.3–5)
WBC: 4 THOU/UL (ref 4–11)

## 2020-10-08 ASSESSMENT — MIFFLIN-ST. JEOR: SCORE: 1071.27

## 2020-10-08 ASSESSMENT — PATIENT HEALTH QUESTIONNAIRE - PHQ9: SUM OF ALL RESPONSES TO PHQ QUESTIONS 1-9: 0

## 2020-10-11 ENCOUNTER — COMMUNICATION - HEALTHEAST (OUTPATIENT)
Dept: INTERNAL MEDICINE | Facility: CLINIC | Age: 75
End: 2020-10-11

## 2020-11-02 ENCOUNTER — COMMUNICATION - HEALTHEAST (OUTPATIENT)
Dept: INTERNAL MEDICINE | Facility: CLINIC | Age: 75
End: 2020-11-02

## 2020-11-02 DIAGNOSIS — E78.5 HYPERLIPIDEMIA: ICD-10-CM

## 2020-11-05 RX ORDER — SIMVASTATIN 40 MG
TABLET ORAL
Qty: 90 TABLET | Refills: 3 | Status: SHIPPED | OUTPATIENT
Start: 2020-11-05 | End: 2021-11-04

## 2020-11-20 ENCOUNTER — COMMUNICATION - HEALTHEAST (OUTPATIENT)
Dept: INTERNAL MEDICINE | Facility: CLINIC | Age: 75
End: 2020-11-20

## 2021-01-15 ENCOUNTER — COMMUNICATION - HEALTHEAST (OUTPATIENT)
Dept: INTERNAL MEDICINE | Facility: CLINIC | Age: 76
End: 2021-01-15

## 2021-01-25 ENCOUNTER — COMMUNICATION - HEALTHEAST (OUTPATIENT)
Dept: INTERNAL MEDICINE | Facility: CLINIC | Age: 76
End: 2021-01-25

## 2021-02-01 ENCOUNTER — COMMUNICATION - HEALTHEAST (OUTPATIENT)
Dept: INTERNAL MEDICINE | Facility: CLINIC | Age: 76
End: 2021-02-01

## 2021-02-17 ENCOUNTER — COMMUNICATION - HEALTHEAST (OUTPATIENT)
Dept: INTERNAL MEDICINE | Facility: CLINIC | Age: 76
End: 2021-02-17

## 2021-02-17 DIAGNOSIS — B00.9 HERPES SIMPLEX VIRUS (HSV) INFECTION: ICD-10-CM

## 2021-02-18 RX ORDER — VALACYCLOVIR HYDROCHLORIDE 1 G/1
TABLET, FILM COATED ORAL
Qty: 14 TABLET | Refills: 1 | Status: SHIPPED | OUTPATIENT
Start: 2021-02-18 | End: 2021-12-30

## 2021-02-19 ENCOUNTER — COMMUNICATION - HEALTHEAST (OUTPATIENT)
Dept: INTERNAL MEDICINE | Facility: CLINIC | Age: 76
End: 2021-02-19

## 2021-02-22 ENCOUNTER — COMMUNICATION - HEALTHEAST (OUTPATIENT)
Dept: INTERNAL MEDICINE | Facility: CLINIC | Age: 76
End: 2021-02-22

## 2021-03-09 ENCOUNTER — OFFICE VISIT - HEALTHEAST (OUTPATIENT)
Dept: INTERNAL MEDICINE | Facility: CLINIC | Age: 76
End: 2021-03-09

## 2021-03-09 DIAGNOSIS — E03.9 ACQUIRED HYPOTHYROIDISM: ICD-10-CM

## 2021-03-09 DIAGNOSIS — E55.9 VITAMIN D DEFICIENCY: ICD-10-CM

## 2021-03-09 DIAGNOSIS — I73.00 RAYNAUD'S DISEASE WITHOUT GANGRENE: ICD-10-CM

## 2021-03-09 DIAGNOSIS — G43.809 OTHER MIGRAINE WITHOUT STATUS MIGRAINOSUS, NOT INTRACTABLE: ICD-10-CM

## 2021-03-09 DIAGNOSIS — I25.10 ATHEROSCLEROSIS OF CORONARY ARTERY OF NATIVE HEART WITHOUT ANGINA PECTORIS, UNSPECIFIED VESSEL OR LESION TYPE: ICD-10-CM

## 2021-03-09 DIAGNOSIS — I10 ESSENTIAL HYPERTENSION: ICD-10-CM

## 2021-03-09 DIAGNOSIS — M94.9 DISORDER OF BONE AND CARTILAGE: ICD-10-CM

## 2021-03-09 DIAGNOSIS — M89.9 DISORDER OF BONE AND CARTILAGE: ICD-10-CM

## 2021-03-09 ASSESSMENT — PATIENT HEALTH QUESTIONNAIRE - PHQ9: SUM OF ALL RESPONSES TO PHQ QUESTIONS 1-9: 0

## 2021-03-30 ENCOUNTER — COMMUNICATION - HEALTHEAST (OUTPATIENT)
Dept: INTERNAL MEDICINE | Facility: CLINIC | Age: 76
End: 2021-03-30

## 2021-04-23 ENCOUNTER — COMMUNICATION - HEALTHEAST (OUTPATIENT)
Dept: INTERNAL MEDICINE | Facility: CLINIC | Age: 76
End: 2021-04-23

## 2021-04-23 DIAGNOSIS — I25.10 CAD (CORONARY ARTERY DISEASE): ICD-10-CM

## 2021-04-23 DIAGNOSIS — I10 HYPERTENSION: ICD-10-CM

## 2021-04-23 RX ORDER — METOPROLOL SUCCINATE 25 MG/1
TABLET, EXTENDED RELEASE ORAL
Qty: 90 TABLET | Refills: 3 | Status: SHIPPED | OUTPATIENT
Start: 2021-04-23 | End: 2022-05-02

## 2021-05-15 ENCOUNTER — RECORDS - HEALTHEAST (OUTPATIENT)
Dept: ADMINISTRATIVE | Facility: OTHER | Age: 76
End: 2021-05-15

## 2021-05-20 ENCOUNTER — RECORDS - HEALTHEAST (OUTPATIENT)
Dept: SCHEDULING | Facility: CLINIC | Age: 76
End: 2021-05-20

## 2021-05-21 ENCOUNTER — COMMUNICATION - HEALTHEAST (OUTPATIENT)
Dept: INTERNAL MEDICINE | Facility: CLINIC | Age: 76
End: 2021-05-21

## 2021-05-21 DIAGNOSIS — F32.A DEPRESSION: ICD-10-CM

## 2021-05-21 RX ORDER — SERTRALINE HYDROCHLORIDE 25 MG/1
TABLET, FILM COATED ORAL
Qty: 270 TABLET | Refills: 3 | Status: SHIPPED | OUTPATIENT
Start: 2021-05-21 | End: 2022-05-26

## 2021-05-24 ENCOUNTER — RECORDS - HEALTHEAST (OUTPATIENT)
Dept: INTERNAL MEDICINE | Facility: CLINIC | Age: 76
End: 2021-05-24

## 2021-05-26 ENCOUNTER — OFFICE VISIT - HEALTHEAST (OUTPATIENT)
Dept: CARDIOLOGY | Facility: CLINIC | Age: 76
End: 2021-05-26

## 2021-05-26 DIAGNOSIS — M16.11 PRIMARY OSTEOARTHRITIS OF RIGHT HIP: ICD-10-CM

## 2021-05-26 DIAGNOSIS — I25.10 CORONARY ARTERY DISEASE INVOLVING NATIVE CORONARY ARTERY OF NATIVE HEART WITHOUT ANGINA PECTORIS: ICD-10-CM

## 2021-05-26 ASSESSMENT — PATIENT HEALTH QUESTIONNAIRE - PHQ9: SUM OF ALL RESPONSES TO PHQ QUESTIONS 1-9: 0

## 2021-05-26 ASSESSMENT — MIFFLIN-ST. JEOR: SCORE: 1084.88

## 2021-05-27 ENCOUNTER — RECORDS - HEALTHEAST (OUTPATIENT)
Dept: ADMINISTRATIVE | Facility: CLINIC | Age: 76
End: 2021-05-27

## 2021-05-27 ASSESSMENT — PATIENT HEALTH QUESTIONNAIRE - PHQ9
SUM OF ALL RESPONSES TO PHQ QUESTIONS 1-9: 0
SUM OF ALL RESPONSES TO PHQ QUESTIONS 1-9: 0

## 2021-05-28 ENCOUNTER — RECORDS - HEALTHEAST (OUTPATIENT)
Dept: ADMINISTRATIVE | Facility: CLINIC | Age: 76
End: 2021-05-28

## 2021-05-29 ENCOUNTER — RECORDS - HEALTHEAST (OUTPATIENT)
Dept: ADMINISTRATIVE | Facility: CLINIC | Age: 76
End: 2021-05-29

## 2021-05-29 NOTE — TELEPHONE ENCOUNTER
Patient Returning Call  Reason for call:  Call Back    Information relayed to patient:  Patient was notified of below that she needs to establish care with a new provider    Patient has additional questions:  Yes     If YES, what are your questions/concerns:  Patient has an established care appointment on 10/11/2019 at 10:40am with Dr Ezio Narvaez. This was the first appointment available at this clinic for the patient to make this appointment.    Will provider refill medication to cover until she can be seen    Okay to leave a detailed message?: Yes

## 2021-05-29 NOTE — PATIENT INSTRUCTIONS - HE
DXA at [facility] to schedule - [phone].  I will let you know if we need to meet again.

## 2021-05-29 NOTE — TELEPHONE ENCOUNTER
Patient has not been seen in over one year. Will need to establish care with a new physician now that Dr. Olsen is no longer her. Please help schedule. Offer other locations as well. We may be able to get a bridged refill once scheduled.  Beatris Garsia CMA ............... 9:36 AM, 05/21/19

## 2021-05-29 NOTE — TELEPHONE ENCOUNTER
RN cannot approve Refill Request    RN can NOT refill this medication PCP messaged that patient is overdue for Labs. Last office visit: 12/8/2015 Sappihre Olsen MD Last Physical: 5/24/2018 Last MTM visit: Visit date not found Last visit same specialty: 5/30/2019 Jerson Arreola MD.  Next visit within 3 mo: Visit date not found  Next physical within 3 mo: Visit date not found      Kavin Guzman, [facility] Triage/Med Refill 6/17/2019    Requested Prescriptions   Pending Prescriptions Disp Refills     levothyroxine (SYNTHROID, LEVOTHROID) 75 MCG tablet [Pharmacy Med Name: LEVOTHYROXINE 75 MCG TABLET] 90 tablet 3     Sig: TAKE 1 TABLET (75 MCG TOTAL) BY MOUTH DAILY.       Thyroid Hormones Protocol Failed - 6/17/2019  1:26 AM        Failed - TSH on file in past 12 months for patient age 12 & older     TSH   Date Value Ref Range Status   05/24/2018 0.70 0.30 - 5.00 uIU/mL Final                   Passed - Provider visit in past 12 months or next 3 months     Last office visit with prescriber/PCP: 12/8/2015 Sapphire Olsen MD OR same dept: 5/30/2019 Jerson Arreola MD OR same specialty: 5/30/2019 Jerson Arreola MD  Last physical: 5/24/2018 Last MTM visit: Visit date not found   Next visit within 3 mo: Visit date not found  Next physical within 3 mo: Visit date not found  Prescriber OR PCP: Sapphire Olsen MD  Last diagnosis associated with med order: 1. Hypothyroid  - levothyroxine (SYNTHROID, LEVOTHROID) 75 MCG tablet [Pharmacy Med Name: LEVOTHYROXINE 75 MCG TABLET]; Take 1 tablet (75 mcg total) by mouth daily.  Dispense: 90 tablet; Refill: 3    If protocol passes may refill for 12 months if within 3 months of last provider visit (or a total of 15 months).

## 2021-05-29 NOTE — TELEPHONE ENCOUNTER
Left voicemail for patient to return call to clinic. When patient returns call, please give them below message.    Beatris Garsia CMA ............... 9:36 AM, 05/21/19

## 2021-05-29 NOTE — PROGRESS NOTES
Assessment/Plan:        1. Osteopenia         Return in about 1 year (around 5/30/2020) for Recheck.    Patient Instructions   DXA at [facility] to schedule - [phone].  I will let you know if we need to meet again.      Chief Complaint   Patient presents with     Follow-up     F/U After DXA Scan     Patient came to see me for the follow-up of the bone density scan but she did not have a bone density scan for the last 2 years.  When I saw her in December, we made a plan that she will repeat a bone density scan in April and then follow-up with me to make decision if she needs any active treatment or no.  When she called to schedule bone density scan, someone made a mistake and schedule her to see me instead just to have a radiology testing.  I went to see if our DEXA technician was still available on the luckily she was already gone for the day.  Patient will need to do the bone density scan and I will inform her if she needs any active treatment depending on the result.    /65   Pulse 66   Wt 135 lb 12.8 oz (61.6 kg)   LMP 04/13/1999   SpO2 96%   BMI 25.04 kg/m    10 minutes spent with the patient and more then 50 % of the time in counseling.  This note has been dictated using voice recognition software. Any grammatical or context distortions are unintentional and inherent to the software      Patient Active Problem List   Diagnosis     Raynaud's Disease     Hyperlipemia     Hypertension     Herpes Simplex Type I     Hypothyroidism     Coronary Artery Disease     Postmenopausal Bleeding     Osteopenia     Migraine Headache     Vitamin D Deficiency     Meningioma     Thyroid eye disease       Current Outpatient Medications on File Prior to Visit   Medication Sig Dispense Refill     aspirin 81 MG EC tablet Take 1 tablet (81 mg total) by mouth daily.  0     biotin 1 mg tablet Take 1,000 mcg by mouth daily.        calcium citrate-vitamin D3 (CITRACAL + D) 315-250 mg-unit per tablet Take 1 tablet by mouth  daily.       cholecalciferol, vitamin D3, (VITAMIN D3) 1,000 unit capsule Take 1,000 Units by mouth daily Take as directed .             levothyroxine (SYNTHROID, LEVOTHROID) 75 MCG tablet Take 1 tablet (75 mcg total) by mouth daily. 90 tablet 3     metoprolol succinate (TOPROL-XL) 25 MG Take 1 tablet (25 mg total) by mouth daily. 90 tablet 3     multivitamin therapeutic (THERAGRAN) tablet Take 1 tablet by mouth daily. 90 tablet 1     sertraline (ZOLOFT) 25 MG tablet TAKE 3 TABLETS BY MOUTH ONCE DAILY 270 tablet 0     simvastatin (ZOCOR) 40 MG tablet TAKE 1 TABLET (40 MG TOTAL) BY MOUTH BEDTIME. 90 tablet 1     valACYclovir (VALTREX) 1000 MG tablet TAKE 1 TABLET BY MOUTH TWICE A DAY FOR FLARE UPS 14 tablet 1     zinc gluconate 50 mg tablet Take 50 mg by mouth daily.       naproxen sodium (ALEVE) 220 MG tablet Take 220 mg by mouth 2 (two) times a day with meals.       No current facility-administered medications on file prior to visit.

## 2021-05-29 NOTE — TELEPHONE ENCOUNTER
Former patient of Sapphire Olsen & has not established care with another provider.  Please assign refill request to covering provider per Clinic standard process.      Refill Approved    Rx renewed per Medication Renewal Policy. Medication was last renewed on 2/20/19.    Adriana Chowdhury, [facility] Connection Triage/Med Refill 5/21/2019     Requested Prescriptions   Pending Prescriptions Disp Refills     simvastatin (ZOCOR) 40 MG tablet [Pharmacy Med Name: SIMVASTATIN 40 MG TABLET] 90 tablet 0     Sig: TAKE 1 TABLET (40 MG TOTAL) BY MOUTH BEDTIME.       Statins Refill Protocol (Hmg CoA Reductase Inhibitors) Passed - 5/20/2019  1:25 AM        Passed - PCP or prescribing provider visit in past 12 months      Last office visit with prescriber/PCP: 12/8/2015 Sapphire Olsen MD OR same dept: 12/4/2018 Jerson Arreola MD OR same specialty: 12/4/2018 Jerson Arreola MD  Last physical: 5/24/2018 Last MTM visit: Visit date not found   Next visit within 3 mo: Visit date not found  Next physical within 3 mo: Visit date not found  Prescriber OR PCP: Sapphire Olsen MD  Last diagnosis associated with med order: 1. Hyperlipidemia  - simvastatin (ZOCOR) 40 MG tablet [Pharmacy Med Name: SIMVASTATIN 40 MG TABLET]; TAKE 1 TABLET (40 MG TOTAL) BY MOUTH BEDTIME.  Dispense: 90 tablet; Refill: 0    If protocol passes may refill for 12 months if within 3 months of last provider visit (or a total of 15 months).

## 2021-05-29 NOTE — TELEPHONE ENCOUNTER
Refill Approved    Rx renewed per Medication Renewal Policy. Medication was last renewed on 2/20/19  #270 R-0.    Last OV 5/30/19    Zuly Mcintyre, [facility] Connection Triage/Med Refill 6/4/2019     Requested Prescriptions   Pending Prescriptions Disp Refills     sertraline (ZOLOFT) 25 MG tablet [Pharmacy Med Name: SERTRALINE HCL 25 MG TABLET] 270 tablet 0     Sig: TAKE 3 TABLETS BY MOUTH ONCE DAILY       SSRI Refill Protocol  Passed - 6/3/2019  1:22 AM        Passed - PCP or prescribing provider visit in last year     Last office visit with prescriber/PCP: 12/8/2015 Sapphire Olsen MD OR same dept: 5/30/2019 Jerson Arreola MD OR same specialty: 5/30/2019 Jerson Arreola MD  Last physical: 5/24/2018 Last MTM visit: Visit date not found   Next visit within 3 mo: Visit date not found  Next physical within 3 mo: Visit date not found  Prescriber OR PCP: Sapphire Olsen MD  Last diagnosis associated with med order: 1. Depression  - sertraline (ZOLOFT) 25 MG tablet [Pharmacy Med Name: SERTRALINE HCL 25 MG TABLET]; TAKE 3 TABLETS BY MOUTH ONCE DAILY  Dispense: 270 tablet; Refill: 0    If protocol passes may refill for 12 months if within 3 months of last provider visit (or a total of 15 months).

## 2021-05-29 NOTE — PROGRESS NOTES
Office Visit   Rios Michael   73 y.o. female    Date of Visit: 6/17/2019    Chief Complaint   Patient presents with     [facility]     Hip Pain     right        Assessment and Plan   1. Atherosclerosis of native coronary artery of native heart without angina pectoris  She had a stent placed several years ago and has done well since then.  She has not had any recurrent chest pain or shortness of breath with exertion.  She takes daily aspirin and has good risk factor modification.    2. Hyperlipidemia, unspecified hyperlipidemia type  We will set her up for repeat lipid panel.  She is on simvastatin without side effect.  - Lipid Cascade; Future    3. Hypertension  Her blood pressure is well controlled.  We will set her up for recheck of her metabolic panel and CBC.  - HM2(CBC w/o Differential); Future  - Basic Metabolic Panel; Future    4. Hypothyroid  She is due for recheck of her thyroid function.  - levothyroxine (SYNTHROID, LEVOTHROID) 75 MCG tablet; Take 1 tablet (75 mcg total) by mouth daily.  Dispense: 90 tablet; Refill: 3  - Thyroid Cascade; Future    5. Osteopenia  Working with the bone clinic.  She is supposed to get a bone density done soon with follow-up.       Return in about 1 year (around 6/17/2020) for Annual physical.     History of Present Illness   This 73 y.o. old female comes in to [facility].  She has a history of coronary artery disease and had PTCA with stent placement approximately 8 to 10 years ago.  She has not had any recurrent chest pain.  She is on a statin medication, aspirin, and a beta-blocker.  She is not diabetic and is a non-smoker.  She tolerates her medications without side effect.  She also has a history of hypothyroidism and is on levothyroxine.  She is due for recheck of her labs.  She has not had any symptoms of hypo-or hyperthyroidism.  She is up-to-date on colonoscopy but will need a repeat in about a year.  She is up-to-date on mammograms.  She has been working  with gynecology as she has had some postmenopausal spotting.  This has recently resolved and she will continue without follow-up.  She also has osteopenia and is scheduled for repeat bone density.  She has been having some right hip pain.  She saw Ortho and they recommended Tylenol at this point.  She has no other acute concerns today.      Review of Systems: As above, systems otherwise reviewed and negative.     Medications, Allergies and Problem List   Patient Active Problem List   Diagnosis     Raynaud's Disease     Hyperlipemia     Hypertension     Herpes Simplex Type I     Hypothyroid     Coronary Artery Disease     Osteopenia     Migraine Headache     Vitamin D Deficiency     Meningioma     Thyroid eye disease     Current Outpatient Medications   Medication Sig Dispense Refill     aspirin 81 MG EC tablet Take 1 tablet (81 mg total) by mouth daily.  0     biotin 1 mg tablet Take 1,000 mcg by mouth daily.        calcium citrate-vitamin D3 (CITRACAL + D) 315-250 mg-unit per tablet Take 1 tablet by mouth daily.       cholecalciferol, vitamin D3, (VITAMIN D3) 1,000 unit capsule Take 1,000 Units by mouth daily Take as directed .             levothyroxine (SYNTHROID, LEVOTHROID) 75 MCG tablet Take 1 tablet (75 mcg total) by mouth daily. 90 tablet 3     metoprolol succinate (TOPROL-XL) 25 MG Take 1 tablet (25 mg total) by mouth daily. 90 tablet 3     multivitamin therapeutic (THERAGRAN) tablet Take 1 tablet by mouth daily. 90 tablet 1     sertraline (ZOLOFT) 25 MG tablet TAKE 3 TABLETS BY MOUTH ONCE DAILY 270 tablet 3     simvastatin (ZOCOR) 40 MG tablet TAKE 1 TABLET (40 MG TOTAL) BY MOUTH BEDTIME. 90 tablet 1     zinc gluconate 50 mg tablet Take 50 mg by mouth daily.       valACYclovir (VALTREX) 1000 MG tablet TAKE 1 TABLET BY MOUTH TWICE A DAY FOR FLARE UPS 14 tablet 1     No current facility-administered medications for this visit.      Allergies   Allergen Reactions     Estradiol Hives     Evista [Raloxifene]  "Hives          Physical Exam     /62 (Patient Site: Right Arm, Patient Position: Sitting)   Pulse 63   Ht 5' 1.5\" (1.562 m)   Wt 137 lb (62.1 kg)   LMP 04/13/1999   SpO2 95%   BMI 25.47 kg/m      General:  Patient is alert and in no apparent distress.  Cardiovascular:  Regular rate and rhythm, normal S1/S2, no murmurs, rubs, or gallop.  Pulmonary:  Lungs are clear to auscultation bilaterally with normal respiratory effort.  Extremities:  No LE edema.   Neurologic Cranial nerves are intact.  No focal deficits.  Psychiatric:  Pleasant, no confusion or agitation   Skin:  Warm and well perfused with no rashes or abnormalities.         Additional Information   Social History     Tobacco Use     Smoking status: Never Smoker     Smokeless tobacco: Never Used   Substance Use Topics     Alcohol use: Yes     Alcohol/week: 3.0 oz     Types: 5 Glasses of wine per week     Drug use: No          Neelam Brown MD    "

## 2021-05-29 NOTE — TELEPHONE ENCOUNTER
Left voicemail for patient to return call to clinic. When patient returns call, please give them below message.    Beatris Garsia CMA ............... 11:41 AM, 05/28/19

## 2021-05-30 ENCOUNTER — RECORDS - HEALTHEAST (OUTPATIENT)
Dept: ADMINISTRATIVE | Facility: CLINIC | Age: 76
End: 2021-05-30

## 2021-05-30 VITALS — HEIGHT: 62 IN | BODY MASS INDEX: 25.27 KG/M2 | WEIGHT: 137.3 LBS

## 2021-05-31 ENCOUNTER — RECORDS - HEALTHEAST (OUTPATIENT)
Dept: ADMINISTRATIVE | Facility: CLINIC | Age: 76
End: 2021-05-31

## 2021-05-31 VITALS — WEIGHT: 138 LBS | BODY MASS INDEX: 25.4 KG/M2 | HEIGHT: 62 IN

## 2021-05-31 NOTE — TELEPHONE ENCOUNTER
Refill Approved    Rx renewed per Medication Renewal Policy. Medication was last renewed on 7/25/2018 with 3 refills.  Last office visit: 6/17/2019 establishing care with PCP Dr EMI Brown.     Fely Guzman, [facility] Triage/Med Refill 8/1/2019     Requested Prescriptions   Pending Prescriptions Disp Refills     metoprolol succinate (TOPROL-XL) 25 MG [Pharmacy Med Name: METOPROLOL SUCC ER 25 MG TAB] 90 tablet 3     Sig: TAKE 1 TABLET BY MOUTH EVERY DAY       Beta-Blockers Refill Protocol Passed - 8/1/2019  1:40 AM        Passed - PCP or prescribing provider visit in past 12 months or next 3 months     Last office visit with prescriber/PCP: 12/8/2015 Sapphire Olsen MD OR same dept: 5/30/2019 Jerson Arreola MD OR same specialty: 6/17/2019 Neelam Brown MD  Last physical: 5/24/2018 Last MTM visit: Visit date not found   Next visit within 3 mo: Visit date not found  Next physical within 3 mo: Visit date not found  Prescriber OR PCP: Sapphire Olsen MD  Last diagnosis associated with med order: 1. CAD (coronary artery disease)  - metoprolol succinate (TOPROL-XL) 25 MG [Pharmacy Med Name: METOPROLOL SUCC ER 25 MG TAB]; TAKE 1 TABLET BY MOUTH EVERY DAY  Dispense: 90 tablet; Refill: 3    2. Hypertension  - metoprolol succinate (TOPROL-XL) 25 MG [Pharmacy Med Name: METOPROLOL SUCC ER 25 MG TAB]; TAKE 1 TABLET BY MOUTH EVERY DAY  Dispense: 90 tablet; Refill: 3    If protocol passes may refill for 12 months if within 3 months of last provider visit (or a total of 15 months).             Passed - Blood pressure filed in past 12 months     BP Readings from Last 1 Encounters:   06/17/19 118/62

## 2021-06-01 ENCOUNTER — RECORDS - HEALTHEAST (OUTPATIENT)
Dept: ADMINISTRATIVE | Facility: OTHER | Age: 76
End: 2021-06-01

## 2021-06-01 VITALS — BODY MASS INDEX: 25.58 KG/M2 | WEIGHT: 139 LBS | HEIGHT: 62 IN

## 2021-06-01 VITALS — HEIGHT: 62 IN | WEIGHT: 136 LBS | BODY MASS INDEX: 25.03 KG/M2

## 2021-06-01 NOTE — TELEPHONE ENCOUNTER
Pt is calling in to report her 7 year old granddaughter came down with Hand Foot Mouth disease two days after she was with her, and the granddaughter had a sore throat when she was with her. Pt has eight grandchildren, and wants to know if this is contagious, if she herself may be a carrier, and if she is exposing them to it. Pt denies any symptoms. Pt was given some information about Hand Foot and mouth, but would like to speak with her provider. Pt also put a message in [employer]Support today.   Please call Danny at [phone].    Provider please advise.    Kavin Guzman, [facility] Triage/Medication Refill

## 2021-06-01 NOTE — TELEPHONE ENCOUNTER
Dr. Brown,    I believe this came across as a nurse triage message as well.  Please advise.    Taylor SUE LPN .......... 1:24 PM  09/20/19

## 2021-06-02 VITALS — HEIGHT: 62 IN | BODY MASS INDEX: 25.82 KG/M2 | WEIGHT: 140.3 LBS

## 2021-06-02 NOTE — PROGRESS NOTES
Preoperative Exam    Scheduled Procedure: Right hip replacement  Surgery Date:  11/12/2019  Surgery Location: [facility], fax [phone]    Surgeon:  Dr Perez    Assessment/Plan:     1. Preop general physical exam  She is scheduled for hip replacement surgery on November 12, 2019.  She has not had general anesthesia in the past.  She has a history of coronary artery disease and is status post stent placement in 2012.  Her blood pressure is well controlled, lipids have been well controlled, and she has not had any symptoms or signs of unstable cardiac disease.  She is able to go up and down stairs without chest pain or palpitations.  There is no sign of heart failure.  She has no history of lung disease and is a non-smoker.  She has not had any symptoms of infection.  She has no history of bleeding or blood clots.  She does have a strong family history of pulmonary embolism in both her brother and son.  These were felt to be unprovoked.  This may increase her risk of blood clot after surgery and therefore it is important that she have appropriate postsurgical DVT prevention.  I will recheck her labs, EKG, and chest x-ray today.  If they are satisfactory then she can proceed as planned.  I did recommend that she take her metoprolol on the day of surgery but hold her other medications that day.  - HM2(CBC w/o Differential)  - Basic Metabolic Panel  - Electrocardiogram Perform - Clinic  - XR Chest 2 Views    2. Hip arthritis    3. Hypertension  This is well controlled.    4. Atherosclerosis of coronary artery of native heart without angina pectoris, unspecified vessel or lesion type  No recent symptoms of chest pain or palpitations.  - HM2(CBC w/o Differential)  - Basic Metabolic Panel  - Electrocardiogram Perform - Clinic  - XR Chest 2 Views    5. Hyperlipidemia, unspecified hyperlipidemia type  His lipid panel was satisfactory.    Surgical Procedure Risk: Intermediate (reported cardiac risk generally  1-5%)  Have you had prior anesthesia?: Yes  Have you or any family members had a previous anesthesia reaction:  No  Do you or any family members have a history of a clotting or bleeding disorder?: Yes: Twin brother- pulmonary embolism  Cardiac Risk Assessment: no increased risk for major cardiac complications    Pending review of diagnostic evaluation, APPROVAL GIVEN to proceed with proposed procedure, without further diagnostic evaluation.      Functional Status: Independent  Patient plans to recover at home with family.     Subjective:      Rios Michael is a 73 y.o. female who presents for a preoperative consultation.  She is scheduled for right hip replacement surgery on .  She has a history of coronary artery disease and had stents placed in .  She is now on a beta-blocker and has good blood pressure control.  Her lipids have been satisfactory and she takes a daily aspirin.  She is not diabetic and is a non-smoker.  She has not had any recent symptoms of chest pain, palpitations, orthopnea or lower extremity edema.  She is able to go up 2 flights of stairs without difficulty.  She has no history of lung disease and has never been a smoker.  She has not had any recent symptoms of infection and no recent cough or other worrisome symptoms.  She has not had general anesthesia in the past.  She has no history of bleeding or blood clots.    She does note a positive family history of pulmonary embolism.  Her brother  of a pulmonary embolism and her son had a pulmonary embolism.  These were felt to be unprovoked.  She did see a hematologist years ago and was told that her risk did not seem to be elevated.  I do not have a copy of that consultation.  Again she has not had any problems with bleeding or blood clots.  She has no other acute concerns today.    All other systems reviewed and are negative, other than those listed in the HPI.    Pertinent History  Do you have difficulty breathing or chest  pain after walking up a flight of stairs: No  History of obstructive sleep apnea: No  Steroid use in the last 6 months: No  Frequent Aspirin/NSAID use: Yes: 81 mg daily  Prior Blood Transfusion: No  Prior Blood Transfusion Reaction: No  If for some reason prior to, during or after the procedure, if it is medically indicated, would you be willing to have a blood transfusion?:  There is no transfusion refusal.    Current Outpatient Medications   Medication Sig Dispense Refill     aspirin 81 MG EC tablet Take 1 tablet (81 mg total) by mouth daily.  0     biotin 1 mg tablet Take 1,000 mcg by mouth daily.        calcium citrate-vitamin D3 (CITRACAL + D) 315-250 mg-unit per tablet Take 1 tablet by mouth daily.       cholecalciferol, vitamin D3, (VITAMIN D3) 1,000 unit capsule Take 1,000 Units by mouth daily Take as directed .             levothyroxine (SYNTHROID, LEVOTHROID) 75 MCG tablet Take 1 tablet (75 mcg total) by mouth daily. 90 tablet 3     metoprolol succinate (TOPROL-XL) 25 MG Take 1 tablet (25 mg total) by mouth daily. 90 tablet 3     multivitamin therapeutic (THERAGRAN) tablet Take 1 tablet by mouth daily. 90 tablet 1     sertraline (ZOLOFT) 25 MG tablet TAKE 3 TABLETS BY MOUTH ONCE DAILY 270 tablet 3     simvastatin (ZOCOR) 40 MG tablet TAKE 1 TABLET (40 MG TOTAL) BY MOUTH BEDTIME. 90 tablet 1     valACYclovir (VALTREX) 1000 MG tablet TAKE 1 TABLET BY MOUTH TWICE A DAY FOR FLARE UPS 14 tablet 1     zinc gluconate 50 mg tablet Take 50 mg by mouth daily.       No current facility-administered medications for this visit.         Allergies   Allergen Reactions     Estradiol Hives     Evista [Raloxifene] Hives       Patient Active Problem List   Diagnosis     Raynaud's Disease     Hyperlipemia     Hypertension     Herpes Simplex Type I     Hypothyroid     Coronary Artery Disease     Osteopenia     Migraine Headache     Vitamin D Deficiency     Meningioma     Thyroid eye disease       Past Medical History:    Diagnosis Date     Breast cyst [id number]     Coronary artery disease      High cholesterol      Hypertension        Past Surgical History:   Procedure Laterality Date     BREAST CYST EXCISION  [id number]    Removed from [facility]     CARDIAC CATHETERIZATION       CORONARY STENT PLACEMENT       HI INSERT INTRACORONARY STENT      Description: Cath Stent Placement;  Recorded: 06/11/2012;  Comments: ADRIANA to mid RCA     HI PUNC/ASPIR BREAST CYST      Description: Breast Surgery Puncture Aspiration Of Cyst;  Recorded: 07/29/2008;       Social History     Socioeconomic History     Marital status:      Spouse name: Not on file     Number of children: 4     Years of education: Not on file     Highest education level: Not on file   Occupational History     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   Social Needs     Financial resource strain: Not on file     Food insecurity:     Worry: Not on file     Inability: Not on file     Transportation needs:     Medical: Not on file     Non-medical: Not on file   Tobacco Use     Smoking status: Never Smoker     Smokeless tobacco: Never Used   Substance and Sexual Activity     Alcohol use: Yes     Alcohol/week: 5.0 standard drinks     Types: 5 Glasses of wine per week     Drug use: No     Sexual activity: Not on file   Lifestyle     Physical activity:     Days per week: Not on file     Minutes per session: Not on file     Stress: Not on file   Relationships     Social connections:     Talks on phone: Not on file     Gets together: Not on file     Attends Buddhist service: Not on file     Active member of club or organization: Not on file     Attends meetings of clubs or organizations: Not on file     Relationship status: Not on file     Intimate partner violence:     Fear of current or ex partner: Not on file     Emotionally abused: Not on file     Physically abused: Not on file     Forced sexual activity: Not on file   Other Topics Concern     Not on file   Social History  "Narrative     Not on file       Patient Care Team:  Neelam Brown MD as PCP - General (Internal Medicine)  Gloria Perla MD (Dermatology)  Jerson Arreola MD as Assigned PCP          Objective:     Vitals:    10/28/19 0858   BP: 118/62   Pulse: 66   SpO2: 94%   Weight: 139 lb (63 kg)   Height: 5' 1.5\" (1.562 m)   LMP: 04/13/1999         Physical Exam:  General:  Patient is alert and in no apparent distress.  Neck:  Supple with no adenopathy or thyroid abnormality noted.  No bruits are detected.  Cardiovascular:  Regular rate and rhythm, normal S1/S2, no murmurs, rubs, or gallop.  Pulmonary:  Lungs are clear to auscultation bilaterally with normal respiratory effort.  No wheezes or rales.  Gastrointestinal:  Abdomen is soft, non-tender, non-distended, with no organomegaly, rebound or guarding.  Extremities:  No joint abnormalities with no LE edema.  Strong dorsalis pedis pulses.  Neurologic Cranial nerves are intact.  No focal deficits.  Psychiatric:  Pleasant, no confusion or agitation   Skin:  Warm and well perfused with no rashes or abnormalities.        There are no Patient Instructions on file for this visit.    EKG: Normal sinus rhythm with no ST segment changes.    Labs:  Labs pending at this time.  Results will be reviewed when available.    Immunization History   Administered Date(s) Administered     DT (pediatric) 03/18/2004     Hep A, Adult IM (19yr & older) 10/02/2009, 12/12/2011     Hep A, historic 10/02/2009, 12/12/2011     Influenza [id number], 01/11/2010     Influenza high dose,seasonal,PF, 65+ yrs 12/08/2015, 11/01/2016, 11/30/2017, 10/09/2018     Influenza, Seasonal, Inj PF IIV3 09/16/2010, 10/23/2014     Influenza, inj, historic,unspecified 09/03/2009, 09/18/2019     Influenza, seasonal,quad inj 6-35 mos 09/16/2010, 10/06/2011, 10/25/2012, 10/24/2014     Influenza,seasonal, Inj IIV3 09/03/2009, 10/06/2011, 10/25/2012, 10/15/2013     Pneumo Conj 13-V (2010&after) 06/09/2015     Pneumo " Polysac 23-V 11/13/2013     Td, Adult, Absorbed 03/18/2004     Td,adult,historic,unspecified 03/18/2004     Tdap 10/02/2009     ZOSTER, LIVE 11/11/2009     ZOSTER, RECOMBINANT, IM 05/30/2019, 09/18/2019           Electronically signed by Neelam Brown MD 10/28/19 9:00 AM

## 2021-06-03 VITALS — BODY MASS INDEX: 25.21 KG/M2 | WEIGHT: 137 LBS | HEIGHT: 62 IN

## 2021-06-03 VITALS — BODY MASS INDEX: 25.4 KG/M2 | HEIGHT: 62 IN | WEIGHT: 138 LBS

## 2021-06-03 VITALS — WEIGHT: 135.8 LBS | BODY MASS INDEX: 25.04 KG/M2

## 2021-06-03 VITALS
DIASTOLIC BLOOD PRESSURE: 62 MMHG | SYSTOLIC BLOOD PRESSURE: 118 MMHG | HEIGHT: 62 IN | WEIGHT: 139 LBS | HEART RATE: 66 BPM | BODY MASS INDEX: 25.58 KG/M2 | OXYGEN SATURATION: 94 %

## 2021-06-03 NOTE — TELEPHONE ENCOUNTER
Left detailed message informing patient Dr. Hanson is aware she will be holding aspirin prior to surgery and no new recommendations.

## 2021-06-03 NOTE — ANESTHESIA PREPROCEDURE EVALUATION
Anesthesia Evaluation      Patient summary reviewed   No history of anesthetic complications     Airway   Mallampati: II  Neck ROM: full   Pulmonary     breath sounds clear to auscultation  (-) COPD, asthma, shortness of breath, recent URI, sleep apnea, not a smoker                         Cardiovascular   (+) hypertension, CAD, CABG/stent, , hypercholesterolemia,     Rhythm: regular        Neuro/Psych    (-) no seizures, no CVA    Endo/Other    (+) hypothyroidism,   (-) no diabetes     GI/Hepatic/Renal    (-) GERD          Dental - normal exam                        Anesthesia Plan  Planned anesthetic: spinal    ASA 3     Anesthetic plan and risks discussed with: patient    Post-op plan: routine recovery

## 2021-06-03 NOTE — TELEPHONE ENCOUNTER
----- Message from Kierra Tamra sent at 11/8/2019  1:21 PM CST -----  General phone call:    Caller: Patient   Primary cardiologist: Valentin  Detailed reason for call: Patient is scheduled for a hip replacement on 11/12. She wanted to make cardiologist aware that she will be holding aspirin 1 week prior to surgery. Please call back with any questions or concerns.  Best phone number: [phone]  Best time to contact: anytime  Ok to leave a detailed message? yes  Device? no    Additional Info:

## 2021-06-03 NOTE — ANESTHESIA CARE TRANSFER NOTE
Last vitals:   Vitals:    11/12/19 0950   BP: 93/48   Pulse: 70   Resp: 15   Temp: 36.7  C (98  F)   SpO2: 99%     Patient's level of consciousness is drowsy  Spontaneous respirations: yes  Maintains airway independently: yes  Dentition unchanged: yes  Oropharynx: oropharynx clear of all foreign objects    QCDR Measures:  ASA# 20 - Surgical Safety Checklist: WHO surgical safety checklist completed prior to induction    PQRS# 430 - Adult PONV Prevention: 4558F - Pt received => 2 anti-emetic agents (different classes) preop & intraop  ASA# 8 - Peds PONV Prevention: NA - Not pediatric patient, not GA or 2 or more risk factors NOT present  PQRS# 424 - Sherry-op Temp Management: 4559F - At least one body temp DOCUMENTED => 35.5C or 95.9F within required timeframe  PQRS# 426 - PACU Transfer Protocol: - Transfer of care checklist used  ASA# 14 - Acute Post-op Pain: ASA14B - Patient did NOT experience pain >= 7 out of 10

## 2021-06-03 NOTE — TELEPHONE ENCOUNTER
Dr. Hanson,  Patient requesting you be aware she will be holding aspirin 81 mg for 1 week prior to upcoming hip surgery 11/12/19.  Any new orders or recommendations?  Thank you,  Autumn

## 2021-06-03 NOTE — ANESTHESIA POSTPROCEDURE EVALUATION
Patient: Rios Michael  RIGHT DIRECT ANTERIOR TOTAL HIP ARTHROPLASTY  Anesthesia type: spinal    Patient location: PACU  Last vitals:   Vitals Value Taken Time   /56 11/12/2019 10:40 AM   Temp 36.9  C (98.4  F) 11/12/2019 10:40 AM   Pulse 70 11/12/2019 10:51 AM   Resp 11 11/12/2019 10:51 AM   SpO2 100 % 11/12/2019 10:51 AM   Vitals shown include unvalidated device data.  Post vital signs: stable  Level of consciousness: awake and responds to simple questions  Post-anesthesia pain: pain controlled  Post-anesthesia nausea and vomiting: no  Pulmonary: unassisted, return to baseline  Cardiovascular: stable and blood pressure at baseline  Hydration: adequate  Anesthetic events: no    QCDR Measures:  ASA# 11 - Sherry-op Cardiac Arrest: ASA11B - Patient did NOT experience unanticipated cardiac arrest  ASA# 12 - Sherry-op Mortality Rate: ASA12B - Patient did NOT die  ASA# 13 - PACU Re-Intubation Rate: ASA13B - Patient did NOT require a new airway mgmt  ASA# 10 - Composite Anes Safety: ASA10A - No serious adverse event    Additional Notes:

## 2021-06-03 NOTE — TELEPHONE ENCOUNTER
Refill Approved    Rx renewed per Medication Renewal Policy. Medication was last renewed on 5/29/2019.    Damion Evangelista, [facility] Triage/Med Refill 11/27/2019     Requested Prescriptions   Pending Prescriptions Disp Refills     simvastatin (ZOCOR) 40 MG tablet 90 tablet 1     Sig: Take 1 tablet (40 mg total) by mouth at bedtime.       Statins Refill Protocol (Hmg CoA Reductase Inhibitors) Passed - 11/26/2019  8:38 AM        Passed - PCP or prescribing provider visit in past 12 months      Last office visit with prescriber/PCP: 6/17/2019 Neelam Brown MD OR same dept: 6/17/2019 Neelam Brown MD OR same specialty: 6/17/2019 Neelam Brown MD  Last physical: 10/28/2019 Last MTM visit: Visit date not found   Next visit within 3 mo: Visit date not found  Next physical within 3 mo: Visit date not found  Prescriber OR PCP: Neelam Brown MD  Last diagnosis associated with med order: 1. Hyperlipidemia  - simvastatin (ZOCOR) 40 MG tablet; Take 1 tablet (40 mg total) by mouth at bedtime.  Dispense: 90 tablet; Refill: 1    If protocol passes may refill for 12 months if within 3 months of last provider visit (or a total of 15 months).

## 2021-06-03 NOTE — ANESTHESIA PROCEDURE NOTES
Spinal Block    Patient location during procedure: OR  Start time: 11/12/2019 8:30 AM  End time: 11/12/2019 8:36 AM  Reason for block: primary anesthetic    Staffing:  Performing  Anesthesiologist: Marcy Sesay MD    Preanesthetic Checklist  Completed: patient identified, risks, benefits, and alternatives discussed, timeout performed, consent obtained, airway assessed, oxygen available, suction available, emergency drugs available and hand hygiene performed  Spinal Block  Patient position: sitting  Prep: ChloraPrep  Patient monitoring: heart rate, blood pressure and continuous pulse ox  Approach: midline  Location: L3-4  Injection technique: single-shot  Needle type: pencil-tip   Needle gauge: 25 G      Additional Notes:  R/B/A discussed with pt who wished to proceed. Single pass of CSF aspiration. Negative for heme. No parasthesias. Pt tolerated procedure well. No complications.

## 2021-06-04 VITALS
HEART RATE: 97 BPM | TEMPERATURE: 102 F | DIASTOLIC BLOOD PRESSURE: 68 MMHG | BODY MASS INDEX: 26.02 KG/M2 | WEIGHT: 140 LBS | RESPIRATION RATE: 16 BRPM | OXYGEN SATURATION: 97 % | SYSTOLIC BLOOD PRESSURE: 116 MMHG

## 2021-06-04 VITALS — HEIGHT: 62 IN | BODY MASS INDEX: 25.03 KG/M2 | WEIGHT: 136 LBS

## 2021-06-04 VITALS
HEIGHT: 62 IN | BODY MASS INDEX: 26.13 KG/M2 | WEIGHT: 142 LBS | OXYGEN SATURATION: 95 % | TEMPERATURE: 98.3 F | SYSTOLIC BLOOD PRESSURE: 120 MMHG | HEART RATE: 76 BPM | DIASTOLIC BLOOD PRESSURE: 60 MMHG

## 2021-06-05 VITALS
SYSTOLIC BLOOD PRESSURE: 120 MMHG | DIASTOLIC BLOOD PRESSURE: 60 MMHG | BODY MASS INDEX: 25.4 KG/M2 | HEIGHT: 62 IN | HEART RATE: 68 BPM | WEIGHT: 138 LBS | TEMPERATURE: 97.8 F | OXYGEN SATURATION: 96 %

## 2021-06-05 NOTE — TELEPHONE ENCOUNTER
I recommend she come in for a visit tomorrow so I can listen to her lungs and determine if she needs an Xray.  Thanks.

## 2021-06-05 NOTE — TELEPHONE ENCOUNTER
Who is calling:  Patient  Reason for Call:  Patient stated that she was diagnosed with Influenza A on Monday at the [facility]. Patient stated that she is now taking Tamiflu and Ibuprofen. Patient stated that when she was at the [facility] the doctor there said that he was thinking about an chest xray to rule out pneumonia but her lungs sounded good so that was not done. Patient stated that she is still experiencing a cough and that her abdomen area hurts when she coughs. Patient is questioning about an chest xray to see if there could be anything there. Patient stated that she has a dry cough, no mucus, no blood. Patient stated that the cough started Saturday. Patient stated that she is also concerned about her temperature. Patient stated that her temperature is at 96.3 - 97.0. Writer informed the patient that her temperatures are within normal range. Patient stated that well either way she is still concern and would rather hear it from Neelam Brown MD. Patient stated that she would prefer to talk to Neelam Brown MD directly. Patient declined nurse triage.  Date of last appointment with primary care: 10/28/19  Okay to leave a detailed message: Yes  [phone]

## 2021-06-05 NOTE — TELEPHONE ENCOUNTER
Spoke with patient and she is aware that PCP is out today and would like to wait for her return to speak to her.    Isha Lovett, CMA

## 2021-06-05 NOTE — TELEPHONE ENCOUNTER
Pt is calling in about having a non-productive dry cough and body aches for the past few days, and a fever of 100.4. Pt reports today her temp has gone up, and fluctuated between 101.8 and 102.2. Pt denies any difficulty breathing, chest pain, or sore throat. Pt denies wheezing, and has not coughed up blood.  Home care measures discussed, and per protocol, pt should be evaluated within 4 hours. Pt agrees with plan, and will go to the [facility]. Advised pt to call back if she has further questions.     Kavin Guzman RN [facility] Triage/Medication Refill    Reason for Disposition    [1] Fever > 101 F (38.3 C) AND [2] age > 60    Protocols used: COUGH - ACUTE NON-PRODUCTIVE-A-AH

## 2021-06-06 NOTE — PROGRESS NOTES
Office Visit   Rios Michael   74 y.o. female    Date of Visit: 3/5/2020    Chief Complaint   Patient presents with     Cough     For 1 month, INF A positive a month ago        Assessment and Plan   1. Cough  She has had a cough now for over a month.  It started with influenza in early February.  The cough is dry but lingers and does not seem to be resolving.  She gets some pain with coughing at the left base.  She did have an abnormal chest x-ray last fall and she is concerned that she may have underlying lung disease.  I will go ahead and recheck her x-ray today.  I am going to refer her to pulmonary to evaluate this further.  She is in agreement with this plan.  - Ambulatory referral to Pulmonology  - XR Chest 2 Views    2. Abnormal chest x-ray  As above.    Return for Next scheduled follow up.     History of Present Illness   This 74 y.o. old female comes in due to a cough.  She developed influenza A over a month ago.  She was treated with Tamiflu.  Since then her symptoms of illness have resolved but she continues with an ongoing lingering cough.  Sometimes keeps her awake at night.  It is a dry cough.  She has some rib pain with coughing.  She has not had any recent fevers or chills or other systemic symptoms.  She feels that when she does get sick her cough often lingers for a long time.  Last fall she had a chest x-ray that showed some scarring at the bases.  She is concerned that she might have an underlying lung disease and is interested in seeing pulmonary.  She has no other acute concerns today.    Review of Systems: As above, systems otherwise reviewed and negative.     Medications, Allergies and Problem List   Patient Active Problem List   Diagnosis     Raynaud's Disease     Hyperlipemia     Hypertension     Herpes Simplex Type I     Hypothyroid     Coronary Artery Disease     Osteopenia     Migraine Headache     Vitamin D deficiency     Meningioma     Thyroid eye disease     Degenerative joint  "disease (DJD) of hip     Status post hip replacement, right     Anemia due to blood loss, acute     Current Outpatient Medications   Medication Sig Dispense Refill     acetaminophen (TYLENOL) 500 MG tablet Take 2 tablets (1,000 mg total) by mouth 3 (three) times a day.  0     aspirin 81 mg chewable tablet Chew 1 tablet (81 mg total) 2 (two) times a day. 60 tablet 0     biotin 1 mg tablet Take 1,000 mcg by mouth daily.        calcium citrate-vitamin D3 (CITRACAL + D) 315-250 mg-unit per tablet Take 1 tablet by mouth daily.       cholecalciferol, vitamin D3, (VITAMIN D3) 1,000 unit capsule Take 1,000 Units by mouth daily.              ibuprofen (ADVIL,MOTRIN) 400 MG tablet Take 1 tablet (400 mg total) by mouth every 6 (six) hours as needed for pain. 20 tablet 0     levothyroxine (SYNTHROID, LEVOTHROID) 75 MCG tablet Take 1 tablet (75 mcg total) by mouth daily. 90 tablet 3     metoprolol succinate (TOPROL-XL) 25 MG Take 1 tablet (25 mg total) by mouth daily. 90 tablet 3     multivitamin therapeutic (THERAGRAN) tablet Take 1 tablet by mouth daily. 90 tablet 1     sertraline (ZOLOFT) 25 MG tablet TAKE 3 TABLETS BY MOUTH ONCE DAILY 270 tablet 3     simvastatin (ZOCOR) 40 MG tablet Take 1 tablet (40 mg total) by mouth at bedtime. 90 tablet 3     valACYclovir (VALTREX) 1000 MG tablet TAKE 1 TABLET BY MOUTH TWICE A DAY FOR FLARE UPS 14 tablet 1     zinc gluconate 50 mg tablet Take 50 mg by mouth daily.       No current facility-administered medications for this visit.      Allergies   Allergen Reactions     Estradiol Hives     Evista [Raloxifene] Hives          Physical Exam     /60 (Patient Site: Left Arm, Patient Position: Sitting)   Pulse 76   Temp 98.3  F (36.8  C)   Ht 5' 1.5\" (1.562 m)   Wt 142 lb (64.4 kg)   LMP 04/13/1999   SpO2 95%   BMI 26.40 kg/m      General:  Patient is alert and in no apparent distress.  Cardiovascular:  Regular rate and rhythm, normal S1/S2, no murmurs, rubs, or " gallop.  Pulmonary:  Lungs are clear to auscultation bilaterally with normal respiratory effort.  No wheezes or rales are detected.         Additional Information   Social History     Tobacco Use     Smoking status: Never Smoker     Smokeless tobacco: Never Used   Substance Use Topics     Alcohol use: Yes     Alcohol/week: 5.0 standard drinks     Types: 5 Glasses of wine per week     Drug use: No          Neelam Brown MD

## 2021-06-08 NOTE — PROGRESS NOTES
ASSESSMENT and PLAN:  1. Visit for screening mammogram  She asked about 3D and would like that if covered by her insurance  - Mammo Screening Bilateral; Future    2. Menopause  She hasn't had a dxa in several years per her report.  She was on fosamax in the past and doesn't want to do that again.  We did discuss prolia.  - DXA Bone Density Scan; Future    3. Hyperlipemia  She had labs this AM.  She's been stable on simvastatin    4. Essential hypertension  Well controlled on meds    5. Hypothyroidism  She's concerned that her dose is too low b/c her hair is thinning.  Labs were done this AM.    6. Coronary atherosclerosis  Stable.  She follows annually w/ Dr. Hanson ([facility]).    7. Postmenopausal bleeding  This has been long standing.  Notes mention 2 neg EMBx, but I haven't seen them.  She was seen by urology as well.  Doing well there. She doesn't want estrogen cream b/c of FHx of clotting.  She's unclear what her dx is and what to expect going forward.  Uterus and ovaries intact.  - Ambulatory referral to Obstetrics / Gynecology    8. Routine general medical examination at a health care facility  Coming due for mammo.      Patient Instructions   Please set up your mammogram:  [phone]  Today's labs will be available on [employer].  If you aren't signed up, then we'll mail them out.  If anything needs more immediate follow up, we'll also call.  [facility]:  [phone]      Orders Placed This Encounter   Procedures     Mammo Screening Bilateral     Standing Status:   Future     Standing Expiration Date:   5/7/2018     Order Specific Question:   Is the patient requesting 3D mammography?     Answer:   Yes     Order Specific Question:   Patient's previous breast density:     Answer:   Heterogeneously dense [3]     Order Specific Question:   Reason for Exam (Describe Symptoms):     Answer:   screening     Order Specific Question:   Can the procedure be changed  per Radiologist protocol?     Answer:   Yes     DXA Bone Density Scan     Standing Status:   Future     Standing Expiration Date:   2/8/2018     Scheduling Instructions:      PATIENT WILL SCHEDULE?  Yes            [facility]       [phone]- [place]       [phone]- [place]       [phone]- [place]      [phone]- [place]     Order Specific Question:   Can the procedure be changed per Radiologist protocol?     Answer:   Yes     Ambulatory referral to Obstetrics / Gynecology     Referral Priority:   Routine     Referral Type:   Consultation     Referral Reason:   Evaluation and Treatment     Requested Specialty:   Obstetrics and Gynecology     Number of Visits Requested:   1     There are no discontinued medications.    No Follow-up on file.    ASSESSED PROBLEMS:  Problem List Items Addressed This Visit     Hyperlipemia    Hypertension    Hypothyroidism    Coronary Artery Disease    Postmenopausal Bleeding    Relevant Orders    Ambulatory referral to Obstetrics / Gynecology      Other Visit Diagnoses     Routine general medical examination at a health care facility    -  Primary    Visit for screening mammogram        Relevant Orders    Mammo Screening Bilateral    Menopause        Relevant Orders    DXA Bone Density Scan          CHIEF COMPLAINT:  Chief Complaint   Patient presents with     Annual Exam     labs done this morning       HISTORY OF PRESENT ILLNESS:  Rios Michael is a 71 y.o. female is presenting to the clinic today for an annual exam.     Post-Menopausal Spotting: She has been experiencing some dark beige colored spotting. The began to occur after 5 to 6 years of abnormal vaginal bleeding. She is not sure if the current spotting is blood or urine. She saw Dr. Mayberry in the past for the bleeding and it was concluded that there was no blood in her urine, and was recommended to see and an OBGYN. During a visit she also had a vaginal ultrasound. Dr. Mayberry told her  that the area around her vagina was erythematous, and was prescribed a topical cream. She did not use the topical cream because of her family history and the potential of the medication causing blood clots. She does still have her uterus and ovaries. She was told to not use soap or to wipe hard. The spotting tends to occur most regularly when she is on her feet for prolonged periods of time, so she has resorted to wearing a pad.     Health Maintenance: She is due for a DXA bone density scan, and will be due for a mammogram in April. She is not due for a colonoscopy and is on the 5 year follow up plan. She gets her eyes checked regularly and visits the dentist regularly. She is up to date on her pneumonia vaccines. She has already gotten her influenza vaccine.     REVIEW OF SYSTEMS:   She is wondering whether her dose needs to be adjusted because her hair has been thinning significantly. She is exercising by lifting with a  regularly. She has thyroid disease of the eye and therefore uses lubricating drops regularly. She occasionally experiences pain in the pelvic area. She has dense fibrous breast tissue. She denies bloody nipple discharge or drainage. The sertraline is working well for her. All other systems are negative.    PFSH:  Her twin brother  of an embolism. Her mother had osteoporosis. She denies family history of diabetes and breast cancer. Her mother had a benign breast lump and she had a breast cyst. Her father had a mild heart attack at the age of 86 after a day of shoveling water.     Past Medical History:   Diagnosis Date     Breast cyst [id number]     Past Surgical History:   Procedure Laterality Date     BREAST CYST EXCISION  [id number]    Removed from [facility]     OR INSERT INTRACORONARY STENT      Description: Cath Stent Placement;  Recorded: 2012;  Comments: ADRIANA to mid RCA     OR PUNC/ASPIR BREAST CYST      Description: Breast Surgery Puncture Aspiration Of Cyst;  Recorded:  "07/29/2008;     Family History   Problem Relation Age of Onset     Osteoporosis Mother      Heart attack Father      Clotting disorder Brother      Diabetes Neg Hx      Breast cancer Neg Hx      Social History     Social History     Marital status: Single     Spouse name: N/A     Number of children: N/A     Years of education: N/A     Occupational History     Not on file.     Social History Main Topics     Smoking status: Never Smoker     Smokeless tobacco: Never Used     Alcohol use Not on file     Drug use: Not on file     Sexual activity: Not on file     Other Topics Concern     Not on file     Social History Narrative       VITALS:  Vitals:    02/07/17 1520   BP: 110/70   Pulse: 64   Weight: 137 lb 4.8 oz (62.3 kg)   Height: 5' 2\" (1.575 m)     Wt Readings from Last 3 Encounters:   02/07/17 137 lb 4.8 oz (62.3 kg)   12/08/15 132 lb 9.6 oz (60.1 kg)   07/02/15 130 lb (59 kg)       PHYSICAL EXAM:  Constitutional:  Reveals an alert, pleasant middle aged female.   Vitals:  Noted.   Head: Normocephalic, without obvious abnormality, atraumatic   Eyes: PERRL, conjunctiva/corneas clear, EOM's intact   Throat: Lips, mucosa, and tongue normal; teeth and gums normal   Neck: Normal ROM, no thyromegaly, no carotid bruits   Lungs: Clear to auscultation bilaterally, respirations unlabored.   Heart: Regular rate and rhythm, S1 and S2 normal, no murmur, rub, or gallop,   Breast exam:  Normal skin overlying her breasts bilaterally no discrete nodule is palpable in the axilla bilaterally  Abdomen: Soft, non-tender, bowel sounds active all four quadrants, no masses, no organomegaly   Extremities: Extremities normal, atraumatic, no cyanosis or edema   Skin: Skin color, texture, turgor normal, no rashes or lesions   Neurologic: Normal     ADDITIONAL HISTORY SUMMARIZED (2): None.  DECISION TO OBTAIN EXTRA INFORMATION (1): None.   RADIOLOGY TESTS (1): Reviewed mammogram from 4/4/16. Ordered 3D Mammogram. Ordered DXA Bone Density Scan. "   LABS (1): Ordered labs. Reviewed labs from 2/7/17.   MEDICINE TESTS (1): None.  INDEPENDENT REVIEW (2 each): None.     The visit lasted a total of 22 minutes face to face with the patient. Over 50% of the time was spent counseling and educating the patient about post menopausal spotting.    I, Michelle Drake, am scribing for and in the presence of, Dr. Sapphire Olsen.    I, Dr. Sapphire Olsen, personally performed the services described in this documentation, as scribed by Michelle Drake in my presence, and it is both accurate and complete.    MEDICATIONS:  Current Outpatient Prescriptions   Medication Sig Dispense Refill     aspirin 81 MG EC tablet Take 81 mg by mouth daily.       calcium citrate-vitamin D3 (CITRACAL + D) 315-250 mg-unit per tablet Take 1 tablet by mouth daily.       cholecalciferol, vitamin D3, (VITAMIN D3) 1,000 unit capsule Take as directed       levothyroxine (SYNTHROID, LEVOTHROID) 75 MCG tablet TAKE ONE TABLET BY MOUTH ONE TIME DAILY  60 tablet 5     metoprolol succinate (TOPROL-XL) 25 MG Take 1 tablet (25 mg total) by mouth daily. 90 tablet 3     multivitamin therapeutic (THERAGRAN) tablet Take 1 tablet by mouth daily. 90 tablet 1     sertraline (ZOLOFT) 25 MG tablet Take 1 tablet 3 times daily 270 tablet 0     simvastatin (ZOCOR) 40 MG tablet Take 1 tablet (40 mg total) by mouth bedtime. 30 tablet 0     valACYclovir (VALTREX) 1000 MG tablet Take 1 tablet (1,000 mg total) by mouth 2 (two) times a day. 6 tablet 1     No current facility-administered medications for this visit.        Total data points: 2

## 2021-06-08 NOTE — TELEPHONE ENCOUNTER
Refill Approved    Rx renewed per Medication Renewal Policy. Medication was last renewed on 6/4/19.    Adriana Chowdhury, [facility] Triage/Med Refill 5/28/2020     Requested Prescriptions   Pending Prescriptions Disp Refills     sertraline (ZOLOFT) 25 MG tablet [Pharmacy Med Name: SERTRALINE HCL 25 MG TABLET] 270 tablet 3     Sig: TAKE 3 TABLETS BY MOUTH ONCE DAILY       SS[facility] Refill Protocol  Passed - 5/26/2020  9:50 AM        Passed - PCP or prescribing provider visit in last year     Last office visit with prescriber/PCP: 3/5/2020 Neelam Brown MD OR same dept: 5/30/2019 Jerson Arreola MD OR same specialty: 3/5/2020 Neelam Brown MD  Last physical: 10/28/2019 Last MTM visit: Visit date not found   Next visit within 3 mo: Visit date not found  Next physical within 3 mo: Visit date not found  Prescriber OR PCP: Neelam Brown MD  Last diagnosis associated with med order: 1. Depression  - sertraline (ZOLOFT) 25 MG tablet [Pharmacy Med Name: SERTRALINE HCL 25 MG TABLET]; TAKE 3 TABLETS BY MOUTH ONCE DAILY  Dispense: 270 tablet; Refill: 3    If protocol passes may refill for 12 months if within 3 months of last provider visit (or a total of 15 months).                             17-Jan-2018

## 2021-06-08 NOTE — PROGRESS NOTES
Rios Michael is a 74 y.o. female who is being evaluated via a billable video visit.        Video Visit   Rios Michael   74 y.o. female    Date of Visit: 6/18/2020    Chief Complaint   Patient presents with     Follow-up        Assessment and Plan   1. Hypertension  Her blood pressure has been satisfactory.  She is tolerating her medications.  She is up-to-date for the most part on her labs.    2. Status post hip replacement, right  She states that her hip is feeling well.  It still a little bit stiff but she is not having any pain.  She will continue to work on increasing her ambulation.    3. Hyperlipidemia, unspecified hyperlipidemia type  She is due for a lipid panel but her last 1 was excellent.  She like to hold off due to the pandemic.  She is not having any cardiac symptoms.  We will schedule her for an annual exam in a few months and we will plan to recheck her lipids at that time.       Return in about 4 months (around 10/18/2020) for in person, Annual physical.     History of Present Illness   This 74 y.o. old female is evaluated with a video visit today.  She has a history of hypertension, hyperlipidemia, and is status post right hip replacement in the last 6 months.  She has been doing well.  Her hip is not painful but a little bit stiff at times.  She notes that her blood pressures been good.  She is due for a lipid panel but would like to hold off due to the pandemic.  She has not had any recent symptoms of chest pain or palpitations.  She was dealing with a chronic cough but that has improved.  At some point she is planning to see a lung specialist but did have to cancel the appointment.  She has no other acute concerns today.    Review of Systems: As above, systems otherwise reviewed and negative.     Medications, Allergies and Problem List   Patient Active Problem List   Diagnosis     Raynaud's Disease     Hyperlipemia     Hypertension     Herpes Simplex Type I     Hypothyroid     Coronary  "Artery Disease     Osteopenia     Migraine Headache     Vitamin D deficiency     Meningioma     Thyroid eye disease     Degenerative joint disease (DJD) of hip     Status post hip replacement, right     Anemia due to blood loss, acute     Current Outpatient Medications   Medication Sig Dispense Refill     aspirin 81 mg chewable tablet Chew 1 tablet (81 mg total) 2 (two) times a day. 60 tablet 0     biotin 1 mg tablet Take 1,000 mcg by mouth daily.        calcium citrate-vitamin D3 (CITRACAL + D) 315-250 mg-unit per tablet Take 1 tablet by mouth daily.       cholecalciferol, vitamin D3, (VITAMIN D3) 1,000 unit capsule Take 1,000 Units by mouth daily.              levothyroxine (SYNTHROID, LEVOTHROID) 75 MCG tablet Take 1 tablet (75 mcg total) by mouth daily. 90 tablet 3     metoprolol succinate (TOPROL-XL) 25 MG Take 1 tablet (25 mg total) by mouth daily. 90 tablet 3     multivitamin therapeutic (THERAGRAN) tablet Take 1 tablet by mouth daily. 90 tablet 1     sertraline (ZOLOFT) 25 MG tablet TAKE 3 TABLETS BY MOUTH ONCE DAILY 270 tablet 3     simvastatin (ZOCOR) 40 MG tablet Take 1 tablet (40 mg total) by mouth at bedtime. 90 tablet 3     valACYclovir (VALTREX) 1000 MG tablet TAKE 1 TABLET BY MOUTH TWICE A DAY FOR FLARE UPS 14 tablet 1     zinc gluconate 50 mg tablet Take 50 mg by mouth daily.       No current facility-administered medications for this visit.      Allergies   Allergen Reactions     Estradiol Hives     Evista [Raloxifene] Hives          Physical Exam       This is an alert female, sitting comfortably, no apparent distress.     Additional Information   Social History     Tobacco Use     Smoking status: Never Smoker     Smokeless tobacco: Never Used   Substance Use Topics     Alcohol use: Yes     Alcohol/week: 5.0 standard drinks     Types: 5 Glasses of wine per week     Drug use: No             Neelam Brown MD      The patient has been notified of following:     \"This video visit will be conducted " "via a call between you and your physician/provider. We have found that certain health care needs can be provided without the need for an in-person physical exam.  This service lets us provide the care you need with a video conversation.  If a prescription is necessary we can send it directly to your pharmacy.  If lab work is needed we can place an order for that and you can then stop by our lab to have the test done at a later time.    Video visits are billed at different rates depending on your insurance coverage. Please reach out to your insurance provider with any questions.    If during the course of the call the physician/provider feels a video visit is not appropriate, you will not be charged for this service.\"    Patient has given verbal consent to a Video visit? Yes    Will anyone else be joining your video visit? No        Video Start Time: 9:00 am      Video-Visit Details    Type of service:  Video Visit    Video End Time (time video stopped): 9:11 am  Originating Location (pt. Location): Home    Distant Location (provider location):  [facility] INTERNAL MEDICINE     Platform used for Video Visit: Andrey Brown MD  "

## 2021-06-12 NOTE — TELEPHONE ENCOUNTER
Refill Approved    Rx renewed per Medication Renewal Policy. Medication was last renewed on 11/277/19.    Adriana Chowdhury, [facility] Triage/Med Refill 11/5/2020     Requested Prescriptions   Pending Prescriptions Disp Refills     simvastatin (ZOCOR) 40 MG tablet [Pharmacy Med Name: SIMVASTATIN 40 MG TABLET] 90 tablet 3     Sig: TAKE 1 TABLET BY MOUTH EVERYDAY AT BEDTIME       Statins Refill Protocol (Hmg CoA Reductase Inhibitors) Passed - 11/2/2020  3:00 PM        Passed - PCP or prescribing provider visit in past 12 months      Last office visit with prescriber/PCP: 3/5/2020 Neelam Brown MD OR same dept: 3/5/2020 Neelam Brown MD OR same specialty: 3/5/2020 Neelam Brown MD  Last physical: 10/8/2020 Last MTM visit: Visit date not found   Next visit within 3 mo: Visit date not found  Next physical within 3 mo: Visit date not found  Prescriber OR PCP: Neelam Brown MD  Last diagnosis associated with med order: 1. Hyperlipidemia  - simvastatin (ZOCOR) 40 MG tablet [Pharmacy Med Name: SIMVASTATIN 40 MG TABLET]; TAKE 1 TABLET BY MOUTH EVERYDAY AT BEDTIME  Dispense: 90 tablet; Refill: 3    If protocol passes may refill for 12 months if within 3 months of last provider visit (or a total of 15 months).

## 2021-06-12 NOTE — PROGRESS NOTES
Assessment and Plan:     1. Wellness examination  Her examination today is satisfactory.  She is due for mammogram and we will set that up.  She is also due for repeat colonoscopy and is planning to schedule that with [facility].  She is fasting today and will do her labs.  She has not had any problems with falls, activities of daily living, or memory.  She has an advanced directive.  She is otherwise up-to-date on age-appropriate health maintenance.    2. Hyperlipidemia, unspecified hyperlipidemia type  She tolerates her simvastatin and has not had any symptoms of chest pain or palpitations.  - Lipid Cascade  - Comprehensive Metabolic Panel    3. Hypertension  Blood pressure is well controlled and we will recheck her labs today.  - HM2(CBC w/o Differential)  - Comprehensive Metabolic Panel    4. Acquired hypothyroidism  - HM2(CBC w/o Differential)  - Thyroid Day    5. Visit for screening mammogram  - Mammo Screening Bilateral; Future      The patient's current medical problems were reviewed.    I have had an Advance Directives discussion with the patient.  The following health maintenance schedule was reviewed with the patient and provided in printed form in the after visit summary:   Health Maintenance   Topic Date Due     TD 18+ HE  10/02/2019     COLORECTAL CANCER SCREENING  11/12/2020     MAMMOGRAM  12/26/2020     DXA SCAN  06/27/2021     MEDICARE ANNUAL WELLNESS VISIT  10/08/2021     FALL RISK ASSESSMENT  10/08/2021     LIPID  06/25/2024     ADVANCE CARE PLANNING  11/12/2024     Pneumococcal Vaccine: 65+ Years  Completed     INFLUENZA VACCINE RULE BASED  Completed     ZOSTER VACCINES  Completed     DEPRESSION ACTION PLAN  Addressed     Pneumococcal Vaccine: Pediatrics (0 to 5 Years) and At-Risk Patients (6 to 64 Years)  Aged Out     HEPATITIS B VACCINES  Aged Out     HEPATITIS C SCREENING  Discontinued        Subjective:   Chief Complaint: Rios Michael is an 74 y.o. female here for an Annual Wellness  visit.   HPI: She comes in for a general checkup today.  We reviewed the documentation for her wellness visit.  She has not had any problems with depression, activities of daily living, falls, or memory.  She has an advanced directive.  She is due for a mammogram and we will set that up.  She is also going to be due for colonoscopy and plans to set that up with [facility].  She has a history of hyperlipidemia, hypothyroidism, and heart disease.  She has not had any recent symptoms of chest pain or palpitations and tolerates her medications quite well.  She is fasting and due for labs today.  She has no acute concerns today.    Review of Systems:   Please see above.  The rest of the review of systems are negative for all systems.    Patient Care Team:  Neelam Brown MD as PCP - General (Internal Medicine)  Gloria Perla MD (Dermatology)  Neelam Brown MD as Assigned PCP     Patient Active Problem List   Diagnosis     Raynaud's Disease     Hyperlipemia     Hypertension     Herpes Simplex Type I     Hypothyroid     Coronary Artery Disease     Osteopenia     Migraine Headache     Vitamin D deficiency     Meningioma     Thyroid eye disease     Degenerative joint disease (DJD) of hip     Status post hip replacement, right     Anemia due to blood loss, acute     Past Medical History:   Diagnosis Date     Arthritis      Breast cyst [id number]     Coronary artery disease      Depression      Disease of thyroid gland      Family history of clotting disorder      High cholesterol      Hypertension       Past Surgical History:   Procedure Laterality Date     BREAST CYST EXCISION  [id number]    Removed from [facility]     CARDIAC CATHETERIZATION       CORONARY STENT PLACEMENT       NM INSERT INTRACORONARY STENT      Description: Cath Stent Placement;  Recorded: 06/11/2012;  Comments: ADRIANA to mid RCA     NM PUNC/ASPIR BREAST CYST      Description: Breast Surgery Puncture Aspiration Of Cyst;  Recorded:  07/29/2008;     RI TOTAL HIP ARTHROPLASTY Right 11/12/2019    Procedure: RIGHT DIRECT ANTERIOR TOTAL HIP ARTHROPLASTY;  Surgeon: Collin Perez MD;  Location: [facility] Main OR;  Service: Orthopedics      Family History   Problem Relation Age of Onset     Osteoporosis Mother      Dementia Mother      Heart attack Father      Alzheimer's disease Father      Clotting disorder Brother      Diabetes Neg Hx       Social History     Socioeconomic History     Marital status:      Spouse name: Not on file     Number of children: 4     Years of education: Not on file     Highest education level: Not on file   Occupational History     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   Social Needs     Financial resource strain: Not on file     Food insecurity     Worry: Not on file     Inability: Not on file     Transportation needs     Medical: Not on file     Non-medical: Not on file   Tobacco Use     Smoking status: Never Smoker     Smokeless tobacco: Never Used   Substance and Sexual Activity     Alcohol use: Yes     Alcohol/week: 5.0 standard drinks     Types: 5 Glasses of wine per week     Drug use: No     Sexual activity: Not on file   Lifestyle     Physical activity     Days per week: Not on file     Minutes per session: Not on file     Stress: Not on file   Relationships     Social connections     Talks on phone: Not on file     Gets together: Not on file     Attends Yazdanism service: Not on file     Active member of club or organization: Not on file     Attends meetings of clubs or organizations: Not on file     Relationship status: Not on file     Intimate partner violence     Fear of current or ex partner: Not on file     Emotionally abused: Not on file     Physically abused: Not on file     Forced sexual activity: Not on file   Other Topics Concern     Not on file   Social History Narrative     Not on file      Current Outpatient Medications   Medication Sig Dispense Refill     aspirin 81 mg chewable tablet  "Chew 1 tablet (81 mg total) 2 (two) times a day. (Patient taking differently: Chew 81 mg daily. ) 60 tablet 0     biotin 1 mg tablet Take 1,000 mcg by mouth daily.        calcium citrate-vitamin D3 (CITRACAL + D) 315-250 mg-unit per tablet Take 1 tablet by mouth daily.       cholecalciferol, vitamin D3, (VITAMIN D3) 1,000 unit capsule Take 1,000 Units by mouth daily.              levothyroxine (SYNTHROID, LEVOTHROID) 75 MCG tablet Take 1 tablet (75 mcg total) by mouth daily. 90 tablet 3     metoprolol succinate (TOPROL-XL) 25 MG Take 1 tablet (25 mg total) by mouth daily. 90 tablet 3     multivitamin therapeutic (THERAGRAN) tablet Take 1 tablet by mouth daily. 90 tablet 1     sertraline (ZOLOFT) 25 MG tablet TAKE 3 TABLETS BY MOUTH ONCE DAILY 270 tablet 3     simvastatin (ZOCOR) 40 MG tablet Take 1 tablet (40 mg total) by mouth at bedtime. 90 tablet 3     valACYclovir (VALTREX) 1000 MG tablet TAKE 1 TABLET BY MOUTH TWICE A DAY FOR FLARE UPS 14 tablet 1     zinc gluconate 50 mg tablet Take 50 mg by mouth daily.       No current facility-administered medications for this visit.       Objective:   Vital Signs:   Visit Vitals  /60 (Patient Site: Left Arm, Patient Position: Sitting)   Pulse 68   Temp 97.8  F (36.6  C)   Ht 5' 1.5\" (1.562 m)   Wt 138 lb (62.6 kg)   LMP 04/13/1999   SpO2 96%   BMI 25.65 kg/m           VisionScreening:  No exam data present     PHYSICAL EXAM  General:  Patient is alert and in no apparent distress.  Neck:  Supple with no adenopathy or thyroid abnormality noted.  Cardiovascular:  Regular rate and rhythm, normal S1/S2, no murmurs, rubs, or gallop.  Pulmonary:  Lungs are clear to auscultation bilaterally with normal respiratory effort.  Gastrointestinal:  Abdomen is soft, non-tender, non-distended, with no organomegaly, rebound or guarding.  Extremities:  No joint abnormalities with no LE edema.  Strong dorsalis pedis pulses bilaterally.  Neurologic Cranial nerves are intact.  No focal " deficits.  Psychiatric:  Pleasant, no confusion or agitation   Skin:  Warm and well perfused with no rashes or abnormalities.  Breast: Normal breast tissue with no masses or abnormalities.      Assessment Results 10/8/2020   Activities of Daily Living No help needed   Instrumental Activities of Daily Living 1 - Full function   Get Up and Go Score Less than 12 seconds   Mini Cog Total Score 5   Some recent data might be hidden     A Mini-Cog score of 0-2 suggests the possibility of dementia, score of 3-5 suggests no dementia        Identified Health Risks:     The patient reports that she has difficulty with instrumental activities of daily living.  Patient's advanced directive was discussed and I am comfortable with the patient's wishes.

## 2021-06-12 NOTE — PATIENT INSTRUCTIONS - HE
Patient Education   Instrumental Activities of Daily Living  Your Health Risk Assessment indicates you have difficulties with instrumental activities of daily living which include laundry, housekeeping, banking, shopping, using the telephone, food preparation, transportation, or taking your own medications. Please make a follow up appointment for us to address this issue in more detail.    [employer] has resources available on the following website: [email or web address]     Also, here is a local agency that provides help with meals and other assistance:   [facility] Line: [phone]       Advance Directive  Patient s advance directive was discussed and I am comfortable with the patient s wishes.  Patient Education   Personalized Prevention Plan  You are due for the preventive services outlined below.  Your care team is available to assist you in scheduling these services.  If you have already completed any of these items, please share that information with your care team to update in your medical record.  Health Maintenance   Topic Date Due     TD 18+ HE  10/02/2019     COLORECTAL CANCER SCREENING  11/12/2020     MAMMOGRAM  12/26/2020     DXA SCAN  06/27/2021     MEDICARE ANNUAL WELLNESS VISIT  10/08/2021     FALL RISK ASSESSMENT  10/08/2021     LIPID  06/25/2024     ADVANCE CARE PLANNING  11/12/2024     Pneumococcal Vaccine: 65+ Years  Completed     INFLUENZA VACCINE RULE BASED  Completed     ZOSTER VACCINES  Completed     DEPRESSION ACTION PLAN  Addressed     Pneumococcal Vaccine: Pediatrics (0 to 5 Years) and At-Risk Patients (6 to 64 Years)  Aged Out     HEPATITIS B VACCINES  Aged Out     HEPATITIS C SCREENING  Discontinued

## 2021-06-14 ENCOUNTER — RECORDS - HEALTHEAST (OUTPATIENT)
Dept: INTERNAL MEDICINE | Facility: CLINIC | Age: 76
End: 2021-06-14

## 2021-06-14 NOTE — TELEPHONE ENCOUNTER
She is concerned about getting the virus, not the vaccine.      I'm not aware that we are scheduling vaccines for people.  Is there a standard response?  Please give it or ask Bhavana.

## 2021-06-15 ENCOUNTER — OFFICE VISIT - HEALTHEAST (OUTPATIENT)
Dept: FAMILY MEDICINE | Facility: CLINIC | Age: 76
End: 2021-06-15

## 2021-06-15 DIAGNOSIS — I25.10 ATHEROSCLEROSIS OF CORONARY ARTERY OF NATIVE HEART WITHOUT ANGINA PECTORIS, UNSPECIFIED VESSEL OR LESION TYPE: ICD-10-CM

## 2021-06-15 DIAGNOSIS — G43.809 OTHER MIGRAINE WITHOUT STATUS MIGRAINOSUS, NOT INTRACTABLE: ICD-10-CM

## 2021-06-15 DIAGNOSIS — Z01.818 PREOPERATIVE EXAMINATION: ICD-10-CM

## 2021-06-15 DIAGNOSIS — E03.9 ACQUIRED HYPOTHYROIDISM: ICD-10-CM

## 2021-06-15 DIAGNOSIS — I10 ESSENTIAL HYPERTENSION: ICD-10-CM

## 2021-06-15 DIAGNOSIS — H26.9 CATARACT OF BOTH EYES, UNSPECIFIED CATARACT TYPE: ICD-10-CM

## 2021-06-15 NOTE — PROGRESS NOTES
Rios Michael is a 75 y.o. female who is being evaluated via a billable video visit.      How would you like to obtain your AVS? MyChart.  If dropped from the video visit, the video invitation should be resent by: Text to cell phone: [phone]  Will anyone else be joining your video visit? No    Video Start Time: 2:31 PM    Assessment & Plan     Rios was seen today for establish care.    Diagnoses and all orders for this visit:    Hypertension, previous blood pressures have been normal.  Today's visit is a video visit, so I do not have any blood pressure readings.  She is on a small dose of metoprolol, 25 mg daily, which is most likely for coronary artery disease.    Acquired hypothyroidism, patient is on levothyroxine 75 mcg daily, TSH was checked October 8, 2020, and this was 1.14.  Patient is also on biotin, and I will review whether this affects TSH results.    Other migraine without status migrainosus, not intractable, patient says this only occurred once, and she does not have any issues with this.    Raynaud's disease without gangrene, patient is on aspirin.  Does not complain of problems with this today.    Osteopenia, last bone density test was done in June 27, 2019: L1-L4 T score -2.2, significant decline 6.8% compared to 2017.  Femoral neck T score -1, right total hip -1.4.  Hip fracture risk 2%, major osteoporotic fracture risk 12.5%.    Vitamin D deficiency, vitamin D 69.5 May 24, 2018.  Patient is on vitamin D 1000 units daily.    Atherosclerosis of coronary artery of native heart without angina pectoris, unspecified vessel or lesion type, patient said that she had a stress test, after she had a flushed feeling down her arm, while pushing her grandson up the street.  After that she had a stent put in her right artery, in 2012 [she says 70 percent stenosis].  Patient has no symptoms such as chest pain, shortness of breath.      30 minutes spent on the date of the encounter doing chart review,  "review of test results, patient visit and documentation   {     BMI:   Estimated body mass index is 25.65 kg/m  as calculated from the following:    Height as of 10/8/20: 5' 1.5\" (1.562 m).    Weight as of 10/8/20: 138 lb (62.6 kg).       Return in about 6 months (around 2021), or Medicare annual wellness.    Elaine Vásquez MD  [facility]    Amado Michael is 75 y.o. and presents today for the following health issues     HPI   This visit is to establish care, however the patient has been given 20 minutes for this visit.  Hypertension, on metoprolol XL 25 mg daily.  Hypothyroidism on levothyroxine 75 mcg daily.  History of migraine, not currently on any medications [patient says this only occurred once].  Raynaud's disease.  Patient is on aspirin 81 mg daily.      Patient says she has osteopenia, will need a DEXA scan.    Patient has oral herpes that comes and goes.  She will take Valtrex 1 on the day, and 1 the next day.  She is not on this every day.  She takes them for geographic tongue.  She is on biotin 1 mg daily [discussed with her that this can interfere with thyroid lab tests].  She is using biotin for hair loss.    She has a history of coronary artery disease, had a stent put in her right artery [70% stenosis], .  At the time that she had this, she was pushing her grandson up the street, she had a flushed feeling down her arm.  She had a stress test, and then needed the stent placed.    Patient has a history of her twin brother dying of pulmonary embolism at age 64.  Her grandmother  of arteriosclerosis.  Father had a slight heart attack at age 89.  Brother has a pacemaker, and another brother had quadruple bypass, at age 73.    Patient has had 3 colonoscopies, supposed to follow-up in 5 years.  The last one she did not have any polyps, and she had waited 7 years.  She has started Metamucil in the past 2 days, she is not currently " constipated.  She describes having had a sharp pain, with a small amount of blood, when she passed large amount of stool.  She then started the Metamucil, and she has not had a return of the symptoms.  She is unaware of hemorrhoids.  There is a lump present there when she wipes.  Discussed with her that she might have a rectal fissure.    No family history of colon cancer.  Mother had dementia in the last 4 years of her life.    Patient says her eyesight has become pretty blurry [cataracts].  Hyperlipidemia, she is on simvastatin 40 mg daily.    She is on sertraline 75 mg which she takes in the middle of the morning.  Has been on this for about 5 to 6 years.  She had a lot of anxiety, was seeing a therapist which helped.    She had pain in her right knee, after kneeling down.  There is a spot that is painful.  This occurred recently in the last 3 to 4 weeks.  She has a right total hip replacement, had a fracture in this after surgery.  She follows with [facility], Dr. Cook.    DEXA scan June 27, 2019  1. The spine bone density L1-L4(L2,L3) with T-score -2.2 and significant decline of 6.8 % compared to 2017.  2. Femoral bone densities show left femoral neck T- score -1.0 and right total hip T-score -1.4, stable.  3. Trabecular bone score indicates moderate trabecular bone architecture.        Past Medical History:   Diagnosis Date     Arthritis      Breast cyst [id number]     Coronary artery disease      Depression      Disease of thyroid gland      Family history of clotting disorder      Herpes Simplex Type I     Created by Conversion  Replacement Utility updated for latest IMO load     High cholesterol      Hypertension      Status post hip replacement, right 11/12/2019     Past Surgical History:   Procedure Laterality Date     BREAST CYST EXCISION  [id number]    Removed from [facility]     CARDIAC CATHETERIZATION       CORONARY STENT PLACEMENT [id number].       MA INSERT INTRACORONARY STENT       Description: Cath Stent Placement;  Recorded: 06/11/2012;  Comments: ADRIANA to mid RCA     WA PUNC/ASPIR BREAST CYST      Description: Breast Surgery Puncture Aspiration Of Cyst;  Recorded: 07/29/2008;     WA TOTAL HIP ARTHROPLASTY Right 11/12/2019    Procedure: RIGHT DIRECT ANTERIOR TOTAL HIP ARTHROPLASTY;  Surgeon: Collin Perez MD;  Location: [facility] Main OR;  Service: Orthopedics     Allergies   Allergen Reactions     Estradiol Hives     Evista [Raloxifene] Hives     Family History   Problem Relation Age of Onset     Osteoporosis Mother      Dementia Mother      Heart attack Father      Alzheimer's disease Father      Clotting disorder Brother      Diabetes Neg Hx      Social History     Social History Narrative    She has 4 children, one in [place], others in the [place].  She has 8 grandchildren.      Social History     Tobacco Use     Smoking status: Never Smoker     Smokeless tobacco: Never Used   Substance Use Topics     Alcohol use: Yes     Alcohol/week: 5.0 standard drinks     Types: 5 Glasses of wine per week     Drug use: No     Heart disease, scarrning at the bottom of her lungs she says (doesn't know why this is). Hypothyroidism.   chronic bibasilar fibroatelectasis      Review of Systems   Rest of review systems is negative.        Objective       Vitals:  No vitals were obtained today due to virtual visit.    Physical Exam   Patient does not appear distressed.        Video-Visit Details    Type of service:  Video Visit    Video End Time (time video stopped): 3:01 PM  Originating Location (pt. Location): Home    Distant Location (provider location):  [facility]     Platform used for Video Visit: [employer]

## 2021-06-16 NOTE — TELEPHONE ENCOUNTER
Refill Approved    Rx renewed per Medication Renewal Policy. Medication was last renewed on 5/5/20 VV.    Fede Rogers, [facility] Triage/Med Refill 4/23/2021     Requested Prescriptions   Pending Prescriptions Disp Refills     metoprolol succinate (TOPROL-XL) 25 MG [Pharmacy Med Name: METOPROLOL SUCC ER 25 MG TAB] 90 tablet 3     Sig: TAKE 1 TABLET BY MOUTH EVERY DAY       Beta-Blockers Refill Protocol Passed - 4/23/2021 12:24 AM        Passed - PCP or prescribing provider visit in past 12 months or next 3 months     Last office visit with prescriber/PCP: 3/5/2020 Neelam Brown MD OR same dept: Visit date not found OR same specialty: 3/5/2020 Neelam Brown MD  Last physical: 10/8/2020 Last MTM visit: Visit date not found   Next visit within 3 mo: Visit date not found  Next physical within 3 mo: Visit date not found  Prescriber OR PCP: Neelam Brown MD  Last diagnosis associated with med order: 1. CAD (coronary artery disease)  - metoprolol succinate (TOPROL-XL) 25 MG [Pharmacy Med Name: METOPROLOL SUCC ER 25 MG TAB]; TAKE 1 TABLET BY MOUTH EVERY DAY  Dispense: 90 tablet; Refill: 3    2. Hypertension  - metoprolol succinate (TOPROL-XL) 25 MG [Pharmacy Med Name: METOPROLOL SUCC ER 25 MG TAB]; TAKE 1 TABLET BY MOUTH EVERY DAY  Dispense: 90 tablet; Refill: 3    If protocol passes may refill for 12 months if within 3 months of last provider visit (or a total of 15 months).             Passed - Blood pressure filed in past 12 months     BP Readings from Last 1 Encounters:   10/08/20 120/60

## 2021-06-17 NOTE — TELEPHONE ENCOUNTER
Called pt. Pt is not sure if she needs a preop as the colonoscopy is not at a hospital. She will call and check if she needs a preop or not. Pt told Dr Vásquez has no openings but can see any provider.

## 2021-06-17 NOTE — TELEPHONE ENCOUNTER
New Appointment Needed  What is the reason for the visit:    Pre-Op Appt Request  When is the surgery? :  6/1/21  Where is the surgery?:   [facility]  Who is the surgeon? :    What type of surgery is being done?: Colonosocopy  Provider Preference: PCP only  How soon do you need to be seen?: As soon as available  Waitlist offered?: No  Okay to leave a detailed message:  Yes

## 2021-06-17 NOTE — TELEPHONE ENCOUNTER
Refill Approved    Rx renewed per Medication Renewal Policy. Medication was last renewed on 5/28/20.    Fede Rogers, [facility] Triage/Med Refill 5/21/2021     Requested Prescriptions   Pending Prescriptions Disp Refills     sertraline (ZOLOFT) 25 MG tablet [Pharmacy Med Name: SERTRALINE HCL 25 MG TABLET] 270 tablet 3     Sig: TAKE 3 TABLETS BY MOUTH EVERY DAY       SSRI Refill Protocol  Passed - 5/21/2021 12:14 AM        Passed - PCP or prescribing provider visit in last year     Last office visit with prescriber/PCP: 3/5/2020 Neelam Brown MD OR same dept: Visit date not found OR same specialty: 3/5/2020 Neelam Brown MD  Last physical: 10/8/2020 Last MTM visit: Visit date not found   Next visit within 3 mo: Visit date not found  Next physical within 3 mo: Visit date not found  Prescriber OR PCP: Neelam Brown MD  Last diagnosis associated with med order: 1. Depression  - sertraline (ZOLOFT) 25 MG tablet [Pharmacy Med Name: SERTRALINE HCL 25 MG TABLET]; TAKE 3 TABLETS BY MOUTH EVERY DAY  Dispense: 270 tablet; Refill: 3    If protocol passes may refill for 12 months if within 3 months of last provider visit (or a total of 15 months).

## 2021-06-17 NOTE — TELEPHONE ENCOUNTER
Left voicemail for patient to return call to clinic. When patient returns call, please give them below message.    Beatris Garsia CMA ............... 5:02 PM, 05/20/21

## 2021-06-17 NOTE — TELEPHONE ENCOUNTER
New Appointment Needed  What is the reason for the visit:    Pre-Op Appt Request  When is the surgery? :  06/01/21 Colonscopy, wants to let care team know she is also having cataract surgery 06/25 and 07/16/21  Where is the surgery?:   [facility]   Who is the surgeon? :  Dr. Javier Mackey ( colonoscopy) Dr Itzel Serrano ( Eye surgeries)  What type of surgery is being done?: Colonoscopy    Provider Preference: PCP only, would be willing to see partner   How soon do you need to be seen?:   Waitlist offered?: No  Okay to leave a detailed message:  Yes

## 2021-06-18 ENCOUNTER — COMMUNICATION - HEALTHEAST (OUTPATIENT)
Dept: ADMINISTRATIVE | Facility: CLINIC | Age: 76
End: 2021-06-18

## 2021-06-18 NOTE — PATIENT INSTRUCTIONS - HE
Patient Instructions by Javier Landaverde DO at 2/3/2020  5:10 PM     Author: Javier Landaverde DO Service: -- Author Type: Physician    Filed: 2/3/2020  6:43 PM Encounter Date: 2/3/2020 Status: Addendum    : Javier Landaverde DO (Physician)    Related Notes: Original Note by Javier Landaverde DO (Physician) filed at 2/3/2020  6:43 PM       See after visit summary hand out for useful information.     Patient Education     Influenza (Adult)    Influenza is also called the flu. It is a viral illness that affects the air passages of your lungs. It is different from the common cold. The flu can easily be passed from one to person to another. It may be spread through the air by coughing and sneezing. Or it can be spread by touching the sick person and then touching your own eyes, nose, or mouth.  The flu starts 1 to 3 days after you are exposed to the flu virus. It may last for 1 to 2 weeks but many people feel tired or fatigued for many weeks afterward. You usually dont need to take antibiotics unless you have a complication. This might be an ear or sinus infection or pneumonia.  Symptoms of the flu may be mild or severe. They can include extreme tiredness (wanting to stay in bed all day), chills, fevers, muscle aches, soreness with eye movement, headache, and a dry, hacking cough.  Home care  Follow these guidelines when caring for yourself at home:    Avoid being around cigarette smoke, whether yours or other peoples.    Acetaminophen or ibuprofen will help ease your fever, muscle aches, and headache. Dont give aspirin to anyone younger than 18 who has the flu. Aspirin can harm the liver.    Nausea and loss of appetite are common with the flu. Eat light meals. Drink 6 to 8 glasses of liquids every day. Good choices are water, sport drinks, soft drinks without caffeine, juices, tea, and soup. Extra fluids will also help loosen secretions in your nose and lungs.    Over-the-counter cold medicines will not make the flu go  away faster. But the medicines may help with coughing, sore throat, and congestion in your nose and sinuses. Dont use a decongestant if you have high blood pressure.    Stay home until your fever has been gone for at least 24 hours without using medicine to reduce fever.  Follow-up care  Follow up with your healthcare provider, or as advised, if you are not getting better over the next week.  If you are age 65 or older, talk with your provider about getting a pneumococcal vaccine every 5 years. You should also get this vaccine if you have chronic asthma or COPD. All adults should get a flu vaccine every fall. Ask your provider about this.  When to seek medical advice  Call your healthcare provider right away if any of these occur:    Cough with lots of colored mucus (sputum) or blood in your mucus    Chest pain, shortness of breath, wheezing, or trouble breathing    Severe headache, or face, neck, or ear pain    New rash with fever    Fever of 100.4 F (38 C) or higher, or as directed by your healthcare provider    Confusion, behavior change, or seizure    Severe weakness or dizziness    You get a new fever or cough after getting better for a few days  Date Last Reviewed: 1/1/2017 2000-2017 [employer]. [address] [place] [address], [place] [address]. All rights reserved. This information is not intended as a substitute for professional medical care. Always follow your healthcare professional's instructions.

## 2021-06-18 NOTE — PROGRESS NOTES
"  Assessment and Plan:     1. Coronary atherosclerosis  She had PCI on RCA w/ ADRIANA 2/29/12.  She's exercising w/o difficulty.   She's on 40 mg of simvastatin and an asa.    2. Hyperlipemia  Stable on simvastatin.  We'll get labs today.  - Comprehensive Metabolic Panel  - Lipid Cascade    3. Hypertension  BP is stable and well controlled on toprol xl 25 mg,   - Comprehensive Metabolic Panel    4. Hypothyroidism  She's on 75 mcg of levothyroixine.  Labs toay  - Thyroid Cascade    5. Osteopenia  DEXA UTD.  Low rxr risk 4/17/17    6. Vitamin D deficiency  We'll recheck her level today  - Vitamin D, Total (25-Hydroxy)    7. Low back pain  I'd like to get a spine film to evaluate for djd, compression.  She may benefit from seeing the spine clinic or PT  - XR Lumbar Spine 2 or 3 VWS; Future    8. Routine general medical examination at a health care facility  She's UTD on immnizations and cancer screens.      The patient's current medical problems were reviewed.    ce schedule was reviewed with the patient and provided in printed form in the after visit summary:   Health Maintenance   Topic Date Due     INFLUENZA VACCINE RULE BASED (Season Ended) 08/01/2018     MAMMOGRAM  04/13/2019     DXA SCAN  04/17/2019     FALL RISK ASSESSMENT  05/24/2019     TD 18+ HE  10/02/2019     COLONOSCOPY  11/12/2020     ADVANCE DIRECTIVES DISCUSSED WITH PATIENT  02/07/2022     PNEUMOCOCCAL POLYSACCHARIDE VACCINE AGE 65 AND OVER  Completed     PNEUMOCOCCAL CONJUGATE VACCINE FOR ADULTS (PCV13 OR PREVNAR)  Completed     ZOSTER VACCINE  Completed        Subjective:   Chief Complaint: Rios Michael is an 72 y.o. female here for an Annual Wellness visit.     HPI:  Rios is dealing w/ a pimple that developed on the tip of her nose.  It's been there for about four days.  It's mildly sore.    She's strulggling w. Bilateral leg soreness.  She has pain in her low back as well.  When she's walking, she'll get an occasional \"zinger\" that comes from her " back and down into her leg.   [facility] and legs.      Review of Systems:  Eyes: blurry vision, may need cataract surgery    Please see above.  The rest of the review of systems are negative for all systems.    Patient Care Team:  Sapphire Olsen MD as PCP - General  Gloria Perla MD (Dermatology)     Patient Active Problem List   Diagnosis     Raynaud's Disease     Hyperlipemia     Hypertension     Herpes Simplex Type I     Hypothyroidism     Coronary Artery Disease     Postmenopausal Bleeding     Osteopenia     Migraine Headache     Vitamin D Deficiency     Meningioma     Thyroid eye disease     Past Medical History:   Diagnosis Date     Breast cyst [id number]     Coronary artery disease      High cholesterol      Hypertension       Past Surgical History:   Procedure Laterality Date     BREAST CYST EXCISION  [id number]    Removed from [facility]     CARDIAC CATHETERIZATION       CORONARY STENT PLACEMENT       VA INSERT INTRACORONARY STENT      Description: Cath Stent Placement;  Recorded: 06/11/2012;  Comments: ADRIANA to mid RCA     VA PUNC/ASPIR BREAST CYST      Description: Breast Surgery Puncture Aspiration Of Cyst;  Recorded: 07/29/2008;      Family History   Problem Relation Age of Onset     Osteoporosis Mother      Heart attack Father      Clotting disorder Brother      Diabetes Neg Hx       Social History     Social History     Marital status: Single     Spouse name: N/A     Number of children: N/A     Years of education: N/A     Occupational History     Not on file.     Social History Main Topics     Smoking status: Never Smoker     Smokeless tobacco: Never Used     Alcohol use 1.8 oz/week     3 Glasses of wine per week     Drug use: No     Sexual activity: Not on file     Other Topics Concern     Not on file     Social History Narrative      Current Outpatient Prescriptions   Medication Sig Dispense Refill     aspirin 81 MG EC tablet Take 81 mg by  "mouth daily.       biotin 1 mg tablet Take 1,000 mcg by mouth 3 (three) times a day.       calcium citrate-vitamin D3 (CITRACAL + D) 315-250 mg-unit per tablet Take 1 tablet by mouth daily.       cholecalciferol, vitamin D3, (VITAMIN D3) 1,000 unit capsule Take as directed       levothyroxine (SYNTHROID, LEVOTHROID) 75 MCG tablet Take 1 tablet (75 mcg total) by mouth daily. 90 tablet 0     metoprolol succinate (TOPROL-XL) 25 MG Take 1 tablet (25 mg total) by mouth daily. 90 tablet 0     multivitamin therapeutic (THERAGRAN) tablet Take 1 tablet by mouth daily. 90 tablet 1     sertraline (ZOLOFT) 25 MG tablet Three tablets (75 mg) by mouth daily 270 tablet 3     sertraline (ZOLOFT) 25 MG tablet TAKE 3 TABLETS BY MOUTH ONCE DAILY 270 tablet 3     simvastatin (ZOCOR) 40 MG tablet TAKE 1 TABLET (40 MG TOTAL) BY MOUTH BEDTIME. 90 tablet 3     valACYclovir (VALTREX) 1000 MG tablet TAKE 1 TABLET (1,000 MG TOTAL) BY MOUTH 2 (TWO) TIMES A DAY FOR FLARE UPS 14 tablet 1     zinc gluconate 50 mg tablet Take 50 mg by mouth daily.       No current facility-administered medications for this visit.       Objective:   Vital Signs:   Visit Vitals     /52     Pulse 80     Ht 5' 2\" (1.575 m)     Wt 136 lb (61.7 kg)     LMP 04/13/1999     BMI 24.87 kg/m2        VisionScreening:  No exam data present     PHYSICAL EXAM  PHYSICAL EXAM:  Constitutional:  Reveals an alert, pleasant adult female.   Vitals:  Noted.   Ears: TM's normal bilaterally   Eyes: PERRL, conjunctiva/corneas clear, EOM's intact   Throat: Lips, mucosa, and tongue normal; teeth and gums normal   Neck: Normal ROM, no carotid bruits, no thyromegaly   Lungs: Clear to auscultation bilaterally, respirations unlabored.   Breast exam:  Normal skin overlying her breasts bilaterally no discrete nodule is palpable in the axilla bilaterally  Heart: Regular rate and rhythm, S1 and S2 normal, no murmur, rub, or gallop,   Abdomen: Soft, non-tender, bowel sounds active, no masses, " no organomegaly   Extremities: Extremities normal, atraumatic, no cyanosis or edema   Pelvic:Not examined  Skin: Skin color, texture, turgor normal, no rashes or lesions   Neurologic: Normal       Assessment Results 5/24/2018   Activities of Daily Living No help needed   Instrumental Activities of Daily Living No help needed   Mini Cog Total Score 5   Some recent data might be hidden     A Mini-Cog score of 0-2 suggests the possibility of dementia, score of 3-5 suggests no dementia    Identified Health Risks:     She is at risk for lack of exercise and has been provided with information to increase physical activity for the benefit of her well-being.  The patient was counseled and encouraged to consider modifying their diet and eating habits. She was provided with information on recommended healthy diet options.  Information regarding advance directives (living guillory), including where she can download the appropriate form, was provided to the patient via the AVS.

## 2021-06-20 NOTE — LETTER
Letter by Sadaf Delgado at      Author: Sadaf Delgado Service: -- Author Type: --    Filed:  Encounter Date: 2020 Status: (Other)         Rios Michael  [address] [place] [address]      2020      MRN: [id number]  : 1945      Dear Ms. Michael,    Thank you for choosing [facility] for your clinic needs. It is our privilege to help our patients achieve and maintain the best possible lung health through regular clinic visits.     We have attempted to contact you to schedule an appointment with  Pulmonary Medicine. To help ensure you are in the best health possible, regular follow-up visits and scheduled/ordered tests with your pulmonary team is essential.      Please call our Patient Scheduling Line at [phone] to schedule your appointment.     We look forward to providing your care. As always, we are available at the number above for any questions or concerns you may have.    Sincerely,    [facility] Pulmonary Medicine

## 2021-06-20 NOTE — LETTER
Letter by Juan Luis Adrian MD at      Author: Juan Luis Adrian MD Service: -- Author Type: --    Filed:  Encounter Date: 3/10/2020 Status: (Other)         Rios Michael  [address] [address]    March 10, 2020    Dear MsUvaldo Michael,    Welcome to [facility]! Your appointment information is below.   Please bring the following to your appointment:    Insurance Card, so we may scan it for our records    Drivers license or valid ID, so we may scan it for our records    Co-pay (as applicable per your insurance plan)    A current list of your medications including over the counter products such as vitamins and supplements    Your medical records including copies of X-Ray films if you are transferring your care from another clinic.  If you do not have your records, please fill out the release of information form and we will request those records.     Provider: Juan Luis Adrian MD  Appointment Date:   Wednesday, April 15, 2020  Arrival Time:  8:00 PFT,  9:00 dr dalton.    Location: [address]        [facility], [address]    **Please allow adequate time for your commute and parking. If you are more than 10 minutes late, you may be asked to reschedule.     If you need to cancel or reschedule your appointment, please notify us at least 24 hours prior to your appointment time so we are able to make this time available for another patient.    Thank you for choosing the [facility] for your health care needs. If you have any questions, please do not hesitate to contact us at any time at   [phone]. We look forward to caring for you.     Sincerely,     [facility] staff

## 2021-06-22 NOTE — PROGRESS NOTES
Dear Dr. Olsen ,  Your patient, Rios Michael was seen today for management of osteopenia.  The last bone density scan was done on 4/17/2017:  1. The spine bone density L1-L4(L2,L3) with T-score -1.6, stable compared to 2014.  2. Femoral bone densities show left femoral neck T- score -1.0 and right total hip T-score -1.5, stable.  3. Trabecular bone score indicates poor trabecular bone architecture.        71 y.o. female with LOW BONE DENSITY (OSTEOPENIA) and LOW fracture risk with major osteoporotic fracture risk 9.7% and hip fracture risk 1.3%.     Patient was treated in the past with Evista for few days ( developed allergy) and Fosamax for 1 year.    Social history:  reports that  has never smoked. she has never used smokeless tobacco. She reports that she drinks about 1.8 oz of alcohol per week. She reports that she does not use drugs.    Past medical history:   Patient Active Problem List   Diagnosis     Raynaud's Disease     Hyperlipemia     Hypertension     Herpes Simplex Type I     Hypothyroidism     Coronary Artery Disease     Postmenopausal Bleeding     Osteopenia     Migraine Headache     Vitamin D Deficiency     Meningioma     Thyroid eye disease     History of fractures: rib fracture        Family History   Problem Relation Age of Onset     Osteoporosis Mother      Dementia Mother      Heart attack Father      Alzheimer's disease Father      Clotting disorder Brother      Diabetes Neg Hx        Diet and supplements: Ca 1 pill daily, MVI, vit D    Risk medications: levothyroxine    Gynecologic history: menopause age 54, HRT for 2 years    Laboratory testing:   Component      Latest Ref Rng & Units 5/24/2018   Sodium      136 - 145 mmol/L 142   Potassium      3.5 - 5.0 mmol/L 4.9   Chloride      98 - 107 mmol/L 103   CO2      22 - 31 mmol/L 26   Anion Gap, Calculation      5 - 18 mmol/L 13   Glucose      70 - 125 mg/dL 80   BUN      8 - 28 mg/dL 21   Creatinine      0.60 - 1.10 mg/dL 1.03   GFR MDRD Af  "Amer      >60 mL/min/1.73m2 >60   GFR MDRD Non Af Amer      >60 mL/min/1.73m2 53 (L)   Bilirubin, Total      0.0 - 1.0 mg/dL 0.8   Calcium      8.5 - 10.5 mg/dL 10.3   Protein, Total      6.0 - 8.0 g/dL 7.2   ALBUMIN      3.5 - 5.0 g/dL 3.9   Alkaline Phosphatase      45 - 120 U/L 70   AST      0 - 40 U/L 26   ALT      0 - 45 U/L 18   Cholesterol      <=199 mg/dL 153   Triglycerides      <=149 mg/dL 64   HDL Cholesterol      >=50 mg/dL 61   LDL Calculated      <=129 mg/dL 79   Patient Fasting > 8hrs?       Yes   TSH      0.30 - 5.00 uIU/mL 0.70   Vitamin D, Total (25-Hydroxy)      30.0 - 80.0 ng/mL 69.5       ROS:     Constitutional: Negative.    HENT: Negative.    Eyes: Negative.    Respiratory: Negative.    Cardiovascular: Negative.    Gastrointestinal: Negative.    Endocrine: Negative.    Genitourinary: Negative.    Musculoskeletal: Negative.    Skin: Negative.    Allergic/Immunologic: Negative.    Neurological: Negative.    Hematological: Negative.    Psychiatric/Behavioral: Negative.      PE:  /60 (Patient Site: Right Arm, Patient Position: Sitting, Cuff Size: Adult Regular)   Pulse 76   Ht 5' 1.75\" (1.568 m)   Wt 140 lb 4.8 oz (63.6 kg)   LMP 04/13/1999   BMI 25.87 kg/m    Constitutional:  oriented to person, place, and time, appears well-nourished. No distress.   HENT:   Head: Normocephalic.   Mouth/Throat: Oropharynx is clear and moist.   Eyes: Conjunctivae are normal. Pupils are equal, round, and reactive to light.   Neck: Normal range of motion. Neck supple.   Cardiovascular: Normal rate, regular rhythm and normal heart sounds.    Pulmonary/Chest: Effort normal and breath sounds normal.  Abdominal: Soft. Bowel sounds are normal.   Musculoskeletal: Normal range of motion.   Neurological: alert and oriented to person, place, and time.   Skin: Skin is warm.   Psychiatric: normal mood and affect.         Impression:   1. Osteopenia  DXA Bone Density Scan       Plan:  1. The patient will take 1200 - " 1500 mg of calcium daily from the diet and supplements.  2. The patient will take 2000 IU of vit D daily.  3. Treatment options discussed with the patient, bit not candidate for active treatment based on the last DXA. I wuld like to see DXA scan in 4/2019 and if FRAX HIGH, can be restarted on Fosamax.  4. I am asking for follow up if DXA showed osteoporosis of osteopenia with HIGH fracture risk .    Patient Instructions   DXA in April 2019 - [phone].        Thank you for the opportunity to participate in the care of your patient. If you have any additional questions, do not hesitate to contact me at [facility].    This note has been dictated using voice recognition software. Any grammatical or context distortions are unintentional and inherent to the software      With best personal regards,   Jerson Arreola MD, CCD  12/4/2018

## 2021-06-23 ENCOUNTER — COMMUNICATION - HEALTHEAST (OUTPATIENT)
Dept: SCHEDULING | Facility: CLINIC | Age: 76
End: 2021-06-23

## 2021-06-23 NOTE — TELEPHONE ENCOUNTER
Refill Approved    Rx renewed per Medication Renewal Policy. Medication was last renewed on 12/18/17.    Ana Laura Hoyos, Care Connection Triage/Med Refill 1/15/2019     Requested Prescriptions   Pending Prescriptions Disp Refills     valACYclovir (VALTREX) 1000 MG tablet [Pharmacy Med Name: VALACYCLOVIR HCL 1 GRAM TABLET] 14 tablet 1     Sig: TAKE 1 TABLET BY MOUTH TWICE A DAY FOR FLARE UPS    Antivirals Refill Protocol Passed - 1/14/2019 12:23 PM       Passed - Renal function done in last year    Creatinine   Date Value Ref Range Status   05/24/2018 1.03 0.60 - 1.10 mg/dL Final            Passed - Visit with PCP or prescribing provider visit in past 12 months or next 3 months    Last office visit with prescriber/PCP: 12/8/2015 Sapphire Olsen MD OR same dept: 12/4/2018 Jerson Arreola MD OR same specialty: 12/4/2018 eJrson Arreola MD  Last physical: 5/24/2018 Last MTM visit: Visit date not found   Next visit within 3 mo: Visit date not found  Next physical within 3 mo: Visit date not found  Prescriber OR PCP: Sapphire Olsen MD  Last diagnosis associated with med order: 1. Herpes simplex virus (HSV) infection  - valACYclovir (VALTREX) 1000 MG tablet [Pharmacy Med Name: VALACYCLOVIR HCL 1 GRAM TABLET]; TAKE 1 TABLET BY MOUTH TWICE A DAY FOR FLARE UPS  Dispense: 14 tablet; Refill: 1    If protocol passes may refill for 12 months if within 3 months of last provider visit (or a total of 15 months).

## 2021-06-24 NOTE — TELEPHONE ENCOUNTER
Refill Approved    Rx renewed per Medication Renewal Policy. Medication was last renewed on 12/21/17.    Bartolome Antonio, Care Connection Triage/Med Refill 2/20/2019     Requested Prescriptions   Pending Prescriptions Disp Refills     sertraline (ZOLOFT) 25 MG tablet [Pharmacy Med Name: SERTRALINE HCL 25 MG TABLET] 270 tablet 3     Sig: TAKE 3 TABLETS BY MOUTH ONCE DAILY    SS[facility] Refill Protocol  Passed - 2/19/2019  5:36 AM       Passed - PCP or prescribing provider visit in last year    Last office visit with prescriber/PCP: 12/8/2015 Sapphire Olsen MD OR same dept: 12/4/2018 Jerson Arreola MD OR same specialty: 12/4/2018 Jerson Arreola MD  Last physical: 5/24/2018 Last MTM visit: Visit date not found   Next visit within 3 mo: Visit date not found  Next physical within 3 mo: Visit date not found  Prescriber OR PCP: Sapphire Olsen MD  Last diagnosis associated with med order: There are no diagnoses linked to this encounter.  If protocol passes may refill for 12 months if within 3 months of last provider visit (or a total of 15 months).

## 2021-06-24 NOTE — TELEPHONE ENCOUNTER
Refill Approved    Rx renewed per Medication Renewal Policy. Medication was last renewed on 2/8/18.    Kathy Aguilar, [facility] Connection Triage/Med Refill 2/20/2019     Requested Prescriptions   Pending Prescriptions Disp Refills     simvastatin (ZOCOR) 40 MG tablet [Pharmacy Med Name: SIMVASTATIN 40 MG TABLET] 90 tablet 3     Sig: TAKE 1 TABLET (40 MG TOTAL) BY MOUTH BEDTIME.    Statins Refill Protocol (Hmg CoA Reductase Inhibitors) Passed - 2/18/2019  1:06 AM       Passed - PCP or prescribing provider visit in past 12 months     Last office visit with prescriber/PCP: 12/8/2015 Sapphire Olsen MD OR same dept: 12/4/2018 Jerson Arreola MD OR same specialty: 12/4/2018 Jerson Arreola MD  Last physical: 5/24/2018 Last MTM visit: Visit date not found   Next visit within 3 mo: Visit date not found  Next physical within 3 mo: Visit date not found  Prescriber OR PCP: Sapphire Olsen MD  Last diagnosis associated with med order: 1. Hyperlipidemia  - simvastatin (ZOCOR) 40 MG tablet [Pharmacy Med Name: SIMVASTATIN 40 MG TABLET]; TAKE 1 TABLET (40 MG TOTAL) BY MOUTH BEDTIME.  Dispense: 90 tablet; Refill: 3    If protocol passes may refill for 12 months if within 3 months of last provider visit (or a total of 15 months).

## 2021-06-25 NOTE — TELEPHONE ENCOUNTER
New Appointment Needed  What is the reason for the visit:    Pre-Op Appt Request  When is the surgery? :  6/25/ & 7/16  Where is the surgery?:   [facility]   Who is the surgeon? :  Dr Serrano  What type of surgery is being done?: Cataracts  Provider Preference: Any available  How soon do you need to be seen?:  This week.   Waitlist offered?: No  Okay to leave a detailed message:  Yes

## 2021-06-25 NOTE — PROGRESS NOTES
Progress Notes by Itzel Hanson MD at 6/6/2017  8:30 AM     Author: Itzel Hanson MD Service: -- Author Type: Physician    Filed: 6/6/2017  9:10 AM Encounter Date: 6/6/2017 Status: Signed    : Itzel Hanson MD (Physician)           Click to link to [facility] HEART CARE NOTE    Thank you, Dr. Olsen, for asking us to see Rios Michael at the [facility].      Assessment/Recommendations   Assessment:    1.  Dyspnea: And dyspnea with exertion increased compared to prior.  May be deconditioning however given her history will proceed with a exercise nuclear stress test for further evaluation.  2.  Coronary artery disease: Status post PCI of RCA in 2012.  Continue on aspirin indefinitely.  3.  Lipids: Lipids from February were reviewed and are very well controlled on simvastatin 40 mg daily.  She does have leg pain so I asked that she hold her simvastatin for 1 week and then resume and see if her symptoms correlate.  Would then consider changing to Crestor.  4.  May follow-up with Dr. Olsen for her complaints of hand tingling and loss of strength.  Follow-up in 1 year.       History of Present Illness    Ms. Rios Michael is a 71 y.o. female with history of coronary artery disease status post PCI of her RCA in February 2012 with preserved LV systolic function, hypertension and dyslipidemia who I am seeing today in follow up.  She has found that she is more short of breath recently.  She walks regularly and plays golf.  When she plays golf she does ride a cart.  She noticed that when she walks up hills she feels short of breath and she feels that she is more out of breath than usual for her.  She denies any chest pain.  She also complains of occasional leg pain as well as hands  When she is driving.  She is also noticing loss in bilateral hand strength.  No complaints of chest pain.       Physical Examination Review of Systems   Vitals:     06/06/17 0837   BP: 110/62   Pulse: 64   Resp: 18     Body mass index is 25.24 kg/(m^2).  Wt Readings from Last 3 Encounters:   06/06/17 138 lb (62.6 kg)   02/07/17 137 lb 4.8 oz (62.3 kg)   12/08/15 132 lb 9.6 oz (60.1 kg)       General Appearance:   alert, no apparent distress   HEENT:  no scleral icterus; the mucous membranes are pink and moist                                  Neck: jugular venous pressure normal   Chest: the spine is straight and the chest is symmetric   Lungs:   respirations unlabored; the lungs are clear to auscultation   Cardiovascular:   regular rhythm with normal first and second heart sounds and no murmurs or gallops; carotid pulses are intact and there are no carotid bruits.   Abdomen:  no organomegaly, masses, bruits, or tenderness; bowel sounds are present   Extremities: No edema   Skin: no xanthelasma    General: WNL  Eyes: WNL  Ears/Nose/Throat: WNL  Lungs: Shortness of Breath  Heart: Shortness of Breath with activity  Stomach: WNL  Bladder: Frequent Urination at Night  Muscle/Joints: Muscle Pain  Skin: WNL  Nervous System: WNL  Mental Health: Anxiety     Blood: WNL     Medical History  Surgical History Family History Social History   Past Medical History:   Diagnosis Date   ? Breast cyst [id number]    Past Surgical History:   Procedure Laterality Date   ? BREAST CYST EXCISION  [id number]    Removed from [place] area   ? AL INSERT INTRACORONARY STENT      Description: Cath Stent Placement;  Recorded: 06/11/2012;  Comments: ADRIANA to mid RCA   ? AL PUNC/ASPIR BREAST CYST      Description: Breast Surgery Puncture Aspiration Of Cyst;  Recorded: 07/29/2008;    Family History   Problem Relation Age of Onset   ? Osteoporosis Mother    ? Heart attack Father    ? Clotting disorder Brother    ? Diabetes Neg Hx     Social History     Social History   ? Marital status: Single     Spouse name: N/A   ? Number of children: N/A   ? Years of education: N/A     Occupational History   ? Not on file.      Social History Main Topics   ? Smoking status: Never Smoker   ? Smokeless tobacco: Never Used   ? Alcohol use Not on file   ? Drug use: Not on file   ? Sexual activity: Not on file     Other Topics Concern   ? Not on file     Social History Narrative          Medications  Allergies   Current Outpatient Prescriptions   Medication Sig Dispense Refill   ? aspirin 81 MG EC tablet Take 81 mg by mouth daily.     ? calcium citrate-vitamin D3 (CITRACAL + D) 315-250 mg-unit per tablet Take 1 tablet by mouth daily.     ? cholecalciferol, vitamin D3, (VITAMIN D3) 1,000 unit capsule Take as directed     ? levothyroxine (SYNTHROID, LEVOTHROID) 75 MCG tablet TAKE ONE TABLET BY MOUTH ONE TIME DAILY 60 tablet 5   ? metoprolol succinate (TOPROL-XL) 25 MG Take 1 tablet (25 mg total) by mouth daily. 90 tablet 3   ? multivitamin therapeutic (THERAGRAN) tablet Take 1 tablet by mouth daily. 90 tablet 1   ? sertraline (ZOLOFT) 25 MG tablet Three tablets (75 mg) by mouth daily 270 tablet 3   ? simvastatin (ZOCOR) 40 MG tablet Take 1 tablet (40 mg total) by mouth bedtime. 90 tablet 3   ? valACYclovir (VALTREX) 1000 MG tablet Take 1 tablet (1,000 mg total) by mouth 2 (two) times a day. 6 tablet 1     No current facility-administered medications for this visit.       Allergies   Allergen Reactions   ? Estradiol Hives   ? Evista [Raloxifene] Hives         Lab Results    Chemistry/lipid CBC Cardiac Enzymes/BNP/TSH/INR   Lab Results   Component Value Date    CHOL 145 02/07/2017    HDL 61 02/07/2017    LDLCALC 72 02/07/2017    TRIG 59 02/07/2017    CREATININE 0.78 02/07/2017    BUN 20 02/07/2017    K 4.8 02/07/2017     02/07/2017     (H) 02/07/2017    CO2 26 02/07/2017    Lab Results   Component Value Date    WBC 3.9 (L) 12/08/2015    HGB 13.6 12/08/2015    HCT 39.5 12/08/2015    MCV 93 12/08/2015     12/08/2015    Lab Results   Component Value Date    TROPONINI <0.01 05/24/2012    TSH 1.44 02/07/2017    INR 1.04 05/24/2012

## 2021-06-26 ENCOUNTER — HEALTH MAINTENANCE LETTER (OUTPATIENT)
Age: 76
End: 2021-06-26

## 2021-06-26 NOTE — TELEPHONE ENCOUNTER
Reason for Call:  Patient calling regarding pre-op that was completed on 6/15/2021 and states that [facility] has not received her pre op yet.      Patient is asking if it can be faxed.      [facility]  Fax: [phone]      Phone Number Patient can be reached at: Home number on file [phone] (home)    Best Time: anytime    Can we leave a detailed message on this number?: No call back needed    Call taken on 6/18/2021 at 3:25 PM by Keena Khalil

## 2021-06-26 NOTE — PROGRESS NOTES
Progress Notes by Itzel Hanson MD at 7/5/2018  9:50 AM     Author: Itzel Hanson MD Service: -- Author Type: Physician    Filed: 7/5/2018 12:54 PM Encounter Date: 7/5/2018 Status: Signed    : Itzel Hanson MD (Physician)           Click to link to [facility] HEART CARE NOTE    Thank you, Dr. Olsen, for asking us to see Rios Michael at the [facility].      Assessment/Recommendations   Assessment:    1.  Coronary artery disease: Status post PCI of RCA in 2012.  Continue on aspirin indefinitely.  No anginal complaints.  Continue regular exercise.  2.    Dyslipidemia: LDL recently checked and well-controlled at 79.  Triglycerides 64 and HDL 61.  Recommend staying on simvastatin 40 mg daily.  3.  Blood pressure remains well controlled.    Follow-up in 1 year.       History of Present Illness    Ms. Rios Michael is a 72 y.o. female with history of coronary artery disease status post PCI of her RCA in February 2012 with preserved left ventricular systolic function, hypertension and dyslipidemia who I am seeing today in follow up.    She reports feeling well since last year.  She is concerned about her lack of activity.  She is limited by back and knee pain.  She does strength training with aerobics 1 time a week and otherwise walks occasionally.  No complaints of chest pain or breathing difficulty.    Exercise nuclear stress test 6/9/2017    The exercise nuclear stress test is negative for inducible myocardial ischemia or infarction.    The left ventricular ejection fraction is greater than 70%.    The patient's exercise capacity is above average for age.    When compared to the images of 5/25/2012, there has been no significant change.       Physical Examination Review of Systems   Vitals:    07/05/18 0957   BP: 116/60   Pulse: 64   Resp: 16     Body mass index is 25.42 kg/(m^2).  Wt Readings from Last 3 Encounters:   07/05/18 139 lb  (63 kg)   05/24/18 136 lb (61.7 kg)   06/06/17 138 lb (62.6 kg)       General Appearance:   alert, no apparent distress   HEENT:  no scleral icterus; the mucous membranes are pink and moist                                  Neck: jugular venous pressure normal, no thyromegaly   Chest: the spine is straight and the chest is symmetric   Lungs:   respirations unlabored; the lungs are clear to auscultation   Cardiovascular:   regular rhythm with normal first and second heart sounds and no murmurs or gallop   Abdomen:  no organomegaly, masses, bruits, or tenderness; bowel sounds are present   Extremities: no cyanosis, clubbing, or edema   Skin: no xanthelasma    General: WNL  Eyes: WNL  Ears/Nose/Throat: WNL  Lungs: WNL  Heart: WNL  Stomach: WNL  Bladder: WNL  Muscle/Joints: Joint Pain  Skin: WNL  Nervous System: Falls  Mental Health: WNL     Blood: WNL     Medical History  Surgical History Family History Social History   Past Medical History:   Diagnosis Date   ? Breast cyst [id number]   ? Coronary artery disease    ? High cholesterol    ? Hypertension     Past Surgical History:   Procedure Laterality Date   ? BREAST CYST EXCISION  [id number]    Removed from [facility] area   ? CARDIAC CATHETERIZATION     ? CORONARY STENT PLACEMENT     ? NC INSERT INTRACORONARY STENT      Description: Cath Stent Placement;  Recorded: 06/11/2012;  Comments: ADRIANA to mid RCA   ? NC PUNC/ASPIR BREAST CYST      Description: Breast Surgery Puncture Aspiration Of Cyst;  Recorded: 07/29/2008;    Family History   Problem Relation Age of Onset   ? Osteoporosis Mother    ? Heart attack Father    ? Clotting disorder Brother    ? Diabetes Neg Hx     Social History     Social History   ? Marital status: Single     Spouse name: N/A   ? Number of children: N/A   ? Years of education: N/A     Occupational History   ? Not on file.     Social History Main Topics   ? Smoking status: Never Smoker   ? Smokeless tobacco: Never Used   ? Alcohol use 1.8 oz/week     3  Glasses of wine per week   ? Drug use: No   ? Sexual activity: Not on file     Other Topics Concern   ? Not on file     Social History Narrative          Medications  Allergies   Current Outpatient Prescriptions   Medication Sig Dispense Refill   ? aspirin 81 MG EC tablet Take 1 tablet (81 mg total) by mouth daily.  0   ? biotin 1 mg tablet Take 1,000 mcg by mouth daily.      ? calcium citrate-vitamin D3 (CITRACAL + D) 315-250 mg-unit per tablet Take 1 tablet by mouth daily.     ? cholecalciferol, vitamin D3, (VITAMIN D3) 1,000 unit capsule Take as directed     ? levothyroxine (SYNTHROID, LEVOTHROID) 75 MCG tablet Take 1 tablet (75 mcg total) by mouth daily. 90 tablet 3   ? metoprolol succinate (TOPROL-XL) 25 MG Take 1 tablet (25 mg total) by mouth daily. 90 tablet 0   ? multivitamin therapeutic (THERAGRAN) tablet Take 1 tablet by mouth daily. 90 tablet 1   ? naproxen sodium (ALEVE) 220 MG tablet Take 220 mg by mouth 2 (two) times a day with meals.     ? sertraline (ZOLOFT) 25 MG tablet TAKE 3 TABLETS BY MOUTH ONCE DAILY 270 tablet 3   ? simvastatin (ZOCOR) 40 MG tablet TAKE 1 TABLET (40 MG TOTAL) BY MOUTH BEDTIME. 90 tablet 3   ? valACYclovir (VALTREX) 1000 MG tablet TAKE 1 TABLET (1,000 MG TOTAL) BY MOUTH 2 (TWO) TIMES A DAY FOR FLARE UPS 14 tablet 1   ? zinc gluconate 50 mg tablet Take 50 mg by mouth daily.     ? sertraline (ZOLOFT) 25 MG tablet Three tablets (75 mg) by mouth daily 270 tablet 3     No current facility-administered medications for this visit.       Allergies   Allergen Reactions   ? Estradiol Hives   ? Evista [Raloxifene] Hives         Lab Results    Chemistry/lipid CBC Cardiac Enzymes/BNP/TSH/INR   Lab Results   Component Value Date    CHOL 153 05/24/2018    HDL 61 05/24/2018    LDLCALC 79 05/24/2018    TRIG 64 05/24/2018    CREATININE 1.03 05/24/2018    BUN 21 05/24/2018    K 4.9 05/24/2018     05/24/2018     05/24/2018    CO2 26 05/24/2018    Lab Results   Component Value Date     WBC 3.9 (L) 12/08/2015    HGB 13.6 12/08/2015    HCT 39.5 12/08/2015    MCV 93 12/08/2015     12/08/2015    Lab Results   Component Value Date    TROPONINI <0.01 05/24/2012    TSH 0.70 05/24/2018    INR 1.04 05/24/2012

## 2021-06-26 NOTE — TELEPHONE ENCOUNTER
Triage call:    Caller: Patient    Patient is having cataract surgery on Friday morning.  Her son needs a place to stay for a night tomorrow, but child has had croup/upper respiratory symptoms.        Pt was advised of protocol recommendation/disposition of homecare and that it would not be a good idea for her to be exposed to what ever the child has due to her pending surgery    Marian Rahman RN 06/23/21 3:54 PM   [facility] Nurse Advisor      COVID 19 Nurse Triage Plan/Patient Instructions    Please be aware that novel coronavirus (COVID-19) may be circulating in the community. If you develop symptoms such as fever, cough, or SOB or if you have concerns about the presence of another infection including coronavirus (COVID-19), please contact your health care provider or visit [email or web address].     Disposition/Instructions    Home care recommended. Follow home care protocol based instructions.    Thank you for taking steps to prevent the spread of this virus.  o Limit your contact with others.  o Wear a simple mask to cover your cough.  o Wash your hands well and often.    Resources    [facility]: About COVID-19: [email or web address]    CDC: What to Do If You're Sick: www.cdc.gov/coronavirus/2019-ncov/about/steps-when-sick.html    CDC: Ending Home Isolation: www.cdc.gov/coronavirus/2019-ncov/hcp/disposition-in-home-patients.html     CDC: Caring for Someone: www.cdc.gov/coronavirus/2019-ncov/if-you-are-sick/care-for-someone.html     [facility]: Interim Guidance for Hospital Discharge to Home: www.health.[email or web address].[email or web address]/diseases/coronavirus/hcp/hospdischarge.pdf    [facility] clinical trials (COVID-19 research studies): [email or web address]-trials     Below are the COVID-19 hotlines at the [place] Department [facility] ([facility]). Interpreters are available.   o For health questions: Call 826-200-9385 or 1-490.217.8433 (7 a.m. to 7 p.m.)  o For questions about schools and  childcare: Call 263-244-0008 or 1-664.120.3375 (7 a.m. to 7 p.m.)     Reason for Disposition    COVID -19 Disease, questions about    Additional Information    Negative: COVID-19 lab test positive    Negative: [1] Lives with someone known to have influenza (flu test positive) AND [2] flu-like symptoms (e.g., cough, runny nose, sore throat, SOB; with or without fever)    Negative: [1] Symptoms of COVID-19 (e.g., cough, fever, SOB, or others) AND [2] HCP diagnosed COVID-19 based on symptoms    Negative: [1] Symptoms of COVID-19 (e.g., cough, fever, SOB, or others) AND [2] lives in an area with community spread    Negative: [1] Symptoms of COVID-19 (e.g., cough, fever, SOB, or others) AND [2] within 14 days of EXPOSURE (close contact) with diagnosed or suspected COVID-19 patient    Negative: [1] Symptoms of COVID-19 (e.g., cough, fever, SOB, or others) AND [2] within 14 days of travel from high-risk area for COVID-19 community spread (identified by CDC)    Negative: [1] Difficulty breathing (shortness of breath) occurs AND [2] onset > 14 days after COVID-19 EXPOSURE (Close Contact)    Negative: [1] Dry cough occurs AND [2] onset > 14 days after COVID-19 EXPOSURE    Negative: [1] Wet cough (i.e., white-yellow, yellow, green, or goran colored sputum) AND [2] onset > 14 days after COVID-19 EXPOSURE    Negative: [1] Common cold symptoms AND [2] onset > 14 days after COVID-19 EXPOSURE    Negative: [1] COVID-19 vaccine series completed (fully vaccinated) AND [2] COVID-19 EXPOSURE AND [3] no symptoms    Negative: COVID-19 vaccine reaction suspected (e.g., fever, headache, muscle aches) occurring during days 1-3 after getting vaccine    Negative: COVID-19 vaccine, questions about    Negative: [1] CLOSE CONTACT COVID-19 EXPOSURE within last 14 days AND [2] needs COVID-19 lab test to return to work AND [3] NO symptoms    Negative: [1] CLOSE CONTACT COVID-19 EXPOSURE within last 14 days AND [2] exposed person is a   (e.g., police or paramedic) AND [3] NO symptoms    Negative: [1] CLOSE CONTACT COVID-19 EXPOSURE within last 14 days AND [2] exposed person is a healthcare worker who was NOT using all recommended personal protective equipment (e.g., a respirator-N95 mask, eye protection, gloves, and gown) AND [3] NO symptoms    Negative: [1] Living or working in a correctional facility, long-term care facility, or shelter (i.e., congregate setting; densely populated) AND [2] where an outbreak has occurred AND [3] NO symptoms    Negative: [1] CLOSE CONTACT COVID-19 EXPOSURE within last 14 days AND [2] NO symptoms    Negative: [1] COVID-19 EXPOSURE AND [2] 15 or more days ago AND [3] NO symptoms    Negative: [1] Living in area with community spread (identified by local PHD) BUT [2] NO symptoms    Negative: [1] Travel from area with community spread (identified by CDC) AND [2] within last 14 days BUT [3] NO symptoms    Negative: [1] No COVID-19 EXPOSURE BUT [2] living with someone who was exposed and who has no symptoms of COVID-19    Negative: [1] Caller concerned that exposure to COVID-19 occurred BUT [2] does not meet COVID-19 EXPOSURE criteria from CDC    Negative: COVID-19 Testing, questions about    Negative: COVID-19 Prevention and Healthy Living, questions about    Protocols used: CORONAVIRUS (COVID-19) EXPOSURE-A- 3.25.21

## 2021-06-26 NOTE — PROGRESS NOTES
[facility]  [address] [address]  Dept: [phone]  Dept Fax: [phone]  Primary Provider: Elaine Vásquez MD  Pre-op Performing Provider: JULIANA LYLE      PREOPERATIVE EVALUATION:  Today's date: 6/15/2021    Rios Michael is a 75 y.o. female who presents for a preoperative evaluation.    Surgical Information:  Surgery/Procedure: cataracts 6/25/21, 7/16/21  Surgery Location: [facility]  Surgeon:   Surgery Date: 6/25/21 and 7/16/21  Time of Surgery: unknown  Where patient plans to recover: At home with family  Fax number for surgical facility: [phone]    Type of Anesthesia Anticipated: to be determined    Assessment & Plan      The proposed surgical procedure is considered LOW risk.    Preoperative examination  No contraindications for planned procedure    Cataract of both eyes, unspecified cataract type  She has had gradually decreasing vision acuity over the last few years and is chosen to proceed with surgery for definitive treatment    Hypertension  Controlled with metoprolol succinate 25 mg    Atherosclerosis of coronary artery of native heart without angina pectoris, unspecified vessel or lesion type  On aspirin 81 mg, as well as simvastatin 40 mg.  No chest pain or shortness of breath recently.    Other migraine without status migrainosus, not intractable  Stable.    Acquired hypothyroidism  On levothyroxine 75 mcg daily    Patient Instructions   Hold all supplements, aspirin and NSAIDs for 7 days prior to surgery.     Follow your surgeon's direction on when to stop eating and drinking prior to surgery.    Your surgeon will be managing your pain after your surgery.      Remove all jewelry and metal piercings before your surgery.     Remove nail polish from fingers before surgery.    If you use a CPAP machine, bring this with you to surgery.             Risks and Recommendations:  The patient has the following additional risks  and recommendations for perioperative complications:   - No identified additional risk factors other than previously addressed    Medication Instructions:  Patient is to take all scheduled medications on the day of surgery    RECOMMENDATION:  APPROVAL GIVEN to proceed with proposed procedure, without further diagnostic evaluation.    18 minutes spent on the date of the encounter doing chart review, history and exam, documentation and further activities per the note        Subjective     Have you ever had a heart attack or stroke? No   Have you ever had surgery on your heart or blood vessels, such as a stent placement, a coronary artery bypass, or surgery on an artery in your head, neck, heart, or legs? YES - Has a stent   Do you have chest pain with activity? No   Do you have a history of  heart failure? No   Do you currently have a cold, bronchitis or symptoms of other infection? No   Do you have a cough, shortness of breath, or wheezing? No   Do you or anyone in your family have previous history of blood clots? YES -    Do you or does anyone in your family have a serious bleeding problem such as prolonged bleeding following surgeries or cuts? No   Have you ever had problems with anemia or been told to take iron pills? No   Have you had any abnormal blood loss such as black, tarry or bloody stools, or abnormal vaginal bleeding? No   Have you ever had a blood transfusion? No   Are you willing to have a blood transfusion if it is medically needed before, during, or after your surgery? Yes   Have you or any of your relatives ever had problems with anesthesia? No   Do you have sleep apnea, excessive snoring or daytime drowsiness? No   Do you have any artifical heart valves or other implanted medical devices like a pacemaker, defibrillator, or continuous glucose monitor? No   Do you have artificial joints? YES - Right hip   Are you allergic to latex? No       Health Care Directive:  Patient has a Health Care Directive  on file.     Preoperative Review of :    reviewed - no record of controlled substances prescribed.    See problem list for active medical problems.  Problems all longstanding and stable, except as noted/documented.  See ROS for pertinent symptoms related to these conditions.      Review of Systems  CONSTITUTIONAL: NEGATIVE for fever, chills, change in weight  ENT/MOUTH: Negative for ear, mouth, and throat problems  RESP: NEGATIVE for significant cough or SOB  CV: NEGATIVE for chest pain, palpitations or peripheral edema    Patient Active Problem List    Diagnosis Date Noted     Raynaud's Disease      Hyperlipemia      Hypertension      Hypothyroid      Coronary Artery Disease      Osteopenia      Migraine Headache      Vitamin D deficiency      Meningioma      Past Medical History:   Diagnosis Date     Arthritis      Breast cyst [id number]     Coronary artery disease      Depression      Disease of thyroid gland      Family history of clotting disorder      Herpes Simplex Type I     Created by Conversion  Replacement Utility updated for latest IMO load     High cholesterol      Hypertension      Status post hip replacement, right 11/12/2019     Past Surgical History:   Procedure Laterality Date     BREAST CYST EXCISION  [id number]    Removed from [facility]     CARDIAC CATHETERIZATION       CORONARY STENT PLACEMENT       NV INSERT INTRACORONARY STENT      Description: Cath Stent Placement;  Recorded: 06/11/2012;  Comments: ADRIANA to mid RCA     NV PUNC/ASPIR BREAST CYST      Description: Breast Surgery Puncture Aspiration Of Cyst;  Recorded: 07/29/2008;     NV TOTAL HIP ARTHROPLASTY Right 11/12/2019    Procedure: RIGHT DIRECT ANTERIOR TOTAL HIP ARTHROPLASTY;  Surgeon: Collin Perez MD;  Location: [facility];  Service: Orthopedics     Current Outpatient Medications   Medication Sig Dispense Refill     aspirin 81 MG EC tablet Take 1 tablet (81 mg total) by mouth daily.  0     calcium citrate-vitamin D3  (CITRACAL + D) 315-250 mg-unit per tablet Take 1 tablet by mouth daily.       cholecalciferol, vitamin D3, (VITAMIN D3) 1,000 unit capsule Take 1,000 Units by mouth daily.              levothyroxine (SYNTHROID, LEVOTHROID) 75 MCG tablet Take 1 tablet (75 mcg total) by mouth daily. 90 tablet 3     metoprolol succinate (TOPROL-XL) 25 MG TAKE 1 TABLET BY MOUTH EVERY DAY 90 tablet 3     multivitamin therapeutic (THERAGRAN) tablet Take 1 tablet by mouth daily. 90 tablet 1     sertraline (ZOLOFT) 25 MG tablet TAKE 3 TABLETS BY MOUTH EVERY  tablet 3     simvastatin (ZOCOR) 40 MG tablet TAKE 1 TABLET BY MOUTH EVERYDAY AT BEDTIME 90 tablet 3     valACYclovir (VALTREX) 1000 MG tablet TAKE 1 TABLET BY MOUTH TWICE A DAY FOR FLARE UPS 14 tablet 1     zinc gluconate 50 mg tablet Take 50 mg by mouth daily.       No current facility-administered medications for this visit.        Allergies   Allergen Reactions     Estradiol Hives     Evista [Raloxifene] Hives       Social History     Tobacco Use     Smoking status: Never Smoker     Smokeless tobacco: Never Used   Substance Use Topics     Alcohol use: Yes     Alcohol/week: 5.0 standard drinks     Types: 5 Glasses of wine per week      Family History   Problem Relation Age of Onset     Osteoporosis Mother      Dementia Mother      Heart attack Father      Alzheimer's disease Father      Clotting disorder Brother      Diabetes Neg Hx      Social History     Substance and Sexual Activity   Drug Use No        Objective     /60   Pulse 64   Wt 138 lb 8 oz (62.8 kg)   LMP 04/13/1999   BMI 25.75 kg/m    Physical Exam  GENERAL APPEARANCE: healthy, alert and no distress  HENT: ear canals and TM's normal and nose and mouth without ulcers or lesions  RESP: lungs clear to auscultation - no rales, rhonchi or wheezes  CV: regular rate and rhythm, normal S1 S2, no S3 or S4 and no murmur, click or rub  ABDOMEN: soft, nontender, no HSM or masses and bowel sounds normal  NEURO:  Normal strength and tone, sensory exam grossly normal, mentation intact and speech normal    Recent Labs   Lab Test 10/08/20  1011 11/13/19  0716 11/13/19  0552 11/12/19  0745 10/28/19  0950   HGB 14.4  --  9.1*  --  13.6     --   --   --  267   INR  --   --   --  1.10  --     134*  --   --  140   K 4.6 4.3  --   --  4.5   CREATININE 0.87 0.74  --   --  0.88        PRE-OP Diagnostics:   No labs were ordered during this visit.  No EKG required for low risk surgery (cataract, skin procedure, breast biopsy, etc).    REVISED CARDIAC RISK INDEX (RCRI)   The patient has the following serious cardiovascular risks for perioperative complications:   - Coronary Artery Disease (MI, positive stress test, angina, Qs on EKG) = 1 point    RCRI INTERPRETATION: 0 points: Class I (very low risk - 0.4% complication rate)         Signed Electronically by: Shon Thao CNP    Copy of this evaluation report is provided to requesting physician.

## 2021-06-27 NOTE — PROGRESS NOTES
Progress Notes by Itzel Hanson MD at 8/22/2019 10:10 AM     Author: Itzel Hanson MD Service: -- Author Type: Physician    Filed: 8/22/2019 12:30 PM Encounter Date: 8/22/2019 Status: Signed    : Itzel Hanson MD (Physician)           Click to link to [facility] HEART CARE NOTE    Thank you, Dr. Brown, for asking us to see Rios Michael at the [facility].      Assessment/Recommendations   Assessment:    1.  Coronary artery disease: Status post PCI of RCA in 2012.  Stress test done in 2017- for inducible ischemia.  No complaints of chest pain.  Continue on aspirin.  2.    Dyslipidemia:  Lipids are reviewed and very well controlled. Recommend staying on simvastatin 40 mg daily.  3.    Hypertension: Blood pressure remains well controlled.     Follow-up in 1 year.       History of Present Illness    Ms. Rios Michael is a 73 y.o. female with history of coronary artery disease status post PCI of her RCA in February 2012 with preserved left ventricular systolic function, hypertension and dyslipidemia who I am seeing today in follow up.     She has continued to do her light weight lifting.  She is very active with her 8 grandchildren but does admit to not walking on a regular basis.  She has not noted any problems with chest pain or breathing difficulty.          Physical Examination Review of Systems   Vitals:    08/22/19 1025   BP: 110/70   Pulse: 68   Resp: 16     Body mass index is 25.65 kg/m .  Wt Readings from Last 3 Encounters:   08/22/19 138 lb (62.6 kg)   06/17/19 137 lb (62.1 kg)   05/30/19 135 lb 12.8 oz (61.6 kg)       General Appearance:   alert, no apparent distress   HEENT:  no scleral icterus; the mucous membranes are pink and moist                                  Neck: No JVD   Chest: the spine is straight and the chest is symmetric   Lungs:   respirations unlabored; the lungs are clear to auscultation   Cardiovascular:    regular rhythm with normal first and second heart sounds and no murmurs or gallops;  there are no carotid  bruits.   Abdomen:  no organomegaly, masses, bruits, or tenderness; bowel sounds are present   Extremities: no cyanosis, clubbing, or edema   Skin: no xanthelasma    General: WNL  Eyes: WNL  Ears/Nose/Throat: WNL  Lungs: WNL  Heart: WNL  Stomach: WNL  Bladder: WNL  Muscle/Joints: Joint Pain  Skin: WNL  Nervous System: WNL  Mental Health: WNL     Blood: WNL     Medical History  Surgical History Family History Social History   Past Medical History:   Diagnosis Date   ? Breast cyst [id number]   ? Coronary artery disease    ? High cholesterol    ? Hypertension     Past Surgical History:   Procedure Laterality Date   ? BREAST CYST EXCISION  [id number]    Removed from [facility]   ? CARDIAC CATHETERIZATION     ? CORONARY STENT PLACEMENT     ? DE INSERT INTRACORONARY STENT      Description: Cath Stent Placement;  Recorded: 06/11/2012;  Comments: ADRIANA to mid RCA   ? DE PUNC/ASPIR BREAST CYST      Description: Breast Surgery Puncture Aspiration Of Cyst;  Recorded: 07/29/2008;    Family History   Problem Relation Age of Onset   ? Osteoporosis Mother    ? Dementia Mother    ? Heart attack Father    ? Alzheimer's disease Father    ? Clotting disorder Brother    ? Diabetes Neg Hx     Social History     Socioeconomic History   ? Marital status:      Spouse name: Not on file   ? Number of children: 4   ? Years of education: Not on file   ? Highest education level: Not on file   Occupational History   ? 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 Financial resource strain: Not on file   ? Food insecurity:     Worry: Not on file     Inability: Not on file   ? Transportation needs:     Medical: Not on file     Non-medical: Not on file   Tobacco Use   ? Smoking status: Never Smoker   ? Smokeless tobacco: Never Used   Substance and Sexual Activity   ? Alcohol use: Yes     Alcohol/week: 3.0 oz     Types: 5  Glasses of wine per week   ? Drug use: No   ? Sexual activity: Not on file   Lifestyle   ? Physical activity:     Days per week: Not on file     Minutes per session: Not on file   ? Stress: Not on file   Relationships   ? Social connections:     Talks on phone: Not on file     Gets together: Not on file     Attends Gnosticist service: Not on file     Active member of club or organization: Not on file     Attends meetings of clubs or organizations: Not on file     Relationship status: Not on file   ? Intimate partner violence:     Fear of current or ex partner: Not on file     Emotionally abused: Not on file     Physically abused: Not on file     Forced sexual activity: Not on file   Other Topics Concern   ? Not on file   Social History Narrative   ? Not on file          Medications  Allergies   Current Outpatient Medications   Medication Sig Dispense Refill   ? aspirin 81 MG EC tablet Take 1 tablet (81 mg total) by mouth daily.  0   ? biotin 1 mg tablet Take 1,000 mcg by mouth daily.      ? calcium citrate-vitamin D3 (CITRACAL + D) 315-250 mg-unit per tablet Take 1 tablet by mouth daily.     ? cholecalciferol, vitamin D3, (VITAMIN D3) 1,000 unit capsule Take 1,000 Units by mouth daily Take as directed .           ? levothyroxine (SYNTHROID, LEVOTHROID) 75 MCG tablet TAKE 1 TABLET (75 MCG TOTAL) BY MOUTH DAILY. 90 tablet 3   ? levothyroxine (SYNTHROID, LEVOTHROID) 75 MCG tablet Take 1 tablet (75 mcg total) by mouth daily. 90 tablet 3   ? metoprolol succinate (TOPROL-XL) 25 MG Take 1 tablet (25 mg total) by mouth daily. 90 tablet 3   ? multivitamin therapeutic (THERAGRAN) tablet Take 1 tablet by mouth daily. 90 tablet 1   ? sertraline (ZOLOFT) 25 MG tablet TAKE 3 TABLETS BY MOUTH ONCE DAILY 270 tablet 3   ? simvastatin (ZOCOR) 40 MG tablet TAKE 1 TABLET (40 MG TOTAL) BY MOUTH BEDTIME. 90 tablet 1   ? valACYclovir (VALTREX) 1000 MG tablet TAKE 1 TABLET BY MOUTH TWICE A DAY FOR FLARE UPS 14 tablet 1   ? zinc gluconate 50  mg tablet Take 50 mg by mouth daily.       No current facility-administered medications for this visit.       Allergies   Allergen Reactions   ? Estradiol Hives   ? Evista [Raloxifene] Hives         Lab Results    Chemistry/lipid CBC Cardiac Enzymes/BNP/TSH/INR   Lab Results   Component Value Date    CHOL 165 06/25/2019    HDL 68 06/25/2019    LDLCALC 82 06/25/2019    TRIG 74 06/25/2019    CREATININE 0.86 06/25/2019    BUN 18 06/25/2019    K 4.6 06/25/2019     06/25/2019     06/25/2019    CO2 27 06/25/2019    Lab Results   Component Value Date    WBC 4.5 06/25/2019    HGB 14.2 06/25/2019    HCT 41.7 06/25/2019    MCV 95 06/25/2019     06/25/2019    Lab Results   Component Value Date    TROPONINI <0.01 05/24/2012    TSH 1.26 06/25/2019    INR 1.04 05/24/2012          Itzel Hanson MD  [facility]

## 2021-06-28 NOTE — PROGRESS NOTES
Progress Notes by Javier Landaverde DO at 2/3/2020  5:10 PM     Author: Javier Landaverde DO Service: -- Author Type: Physician    Filed: 2/4/2020 10:43 PM Encounter Date: 2/3/2020 Status: Signed    : Javier Landaverde DO (Physician)       Chief Complaint   Patient presents with   ? Cough     x3 days   ? Fever     102   ? Generalized Body Aches     History of Present Illness: Rooming staff notes reviewed.  Patient is seen for chief concern of recent cough for the past 3 days, fever up to 102 degrees, and generalized body aches.  She does not feel like she is having to work harder to breathe at time of exam.    Review of systems: See history of present illness, all others negative.     Current Outpatient Medications   Medication Sig Dispense Refill   ? aspirin 81 mg chewable tablet Chew 1 tablet (81 mg total) 2 (two) times a day. 60 tablet 0   ? biotin 1 mg tablet Take 1,000 mcg by mouth daily.      ? calcium citrate-vitamin D3 (CITRACAL + D) 315-250 mg-unit per tablet Take 1 tablet by mouth daily.     ? cholecalciferol, vitamin D3, (VITAMIN D3) 1,000 unit capsule Take 1,000 Units by mouth daily.            ? levothyroxine (SYNTHROID, LEVOTHROID) 75 MCG tablet Take 1 tablet (75 mcg total) by mouth daily. 90 tablet 3   ? metoprolol succinate (TOPROL-XL) 25 MG Take 1 tablet (25 mg total) by mouth daily. 90 tablet 3   ? multivitamin therapeutic (THERAGRAN) tablet Take 1 tablet by mouth daily. 90 tablet 1   ? sertraline (ZOLOFT) 25 MG tablet TAKE 3 TABLETS BY MOUTH ONCE DAILY 270 tablet 3   ? simvastatin (ZOCOR) 40 MG tablet Take 1 tablet (40 mg total) by mouth at bedtime. 90 tablet 3   ? valACYclovir (VALTREX) 1000 MG tablet TAKE 1 TABLET BY MOUTH TWICE A DAY FOR FLARE UPS 14 tablet 1   ? zinc gluconate 50 mg tablet Take 50 mg by mouth daily.     ? acetaminophen (TYLENOL) 500 MG tablet Take 2 tablets (1,000 mg total) by mouth 3 (three) times a day.  0   ? ibuprofen (ADVIL,MOTRIN) 400 MG tablet Take 1 tablet (400 mg  total) by mouth every 6 (six) hours as needed for pain. 20 tablet 0   ? oseltamivir (TAMIFLU) 75 MG capsule Take 1 capsule (75 mg total) by mouth 2 (two) times a day for 5 days. 10 capsule 0     No current facility-administered medications for this visit.      Past Medical History:   Diagnosis Date   ? Arthritis    ? Breast cyst [id number]   ? Coronary artery disease    ? Depression    ? Disease of thyroid gland    ? Family history of clotting disorder    ? High cholesterol    ? Hypertension       Past Surgical History:   Procedure Laterality Date   ? BREAST CYST EXCISION  [id number]    Removed from [facility] area   ? CARDIAC CATHETERIZATION     ? CORONARY STENT PLACEMENT     ? [place] INSERT INTRACORONARY STENT      Description: Cath Stent Placement;  Recorded: 06/11/2012;  Comments: ADRIANA to mid RCA   ? AZ PUNC/ASPIR BREAST CYST      Description: Breast Surgery Puncture Aspiration Of Cyst;  Recorded: 07/29/2008;   ? AZ TOTAL HIP ARTHROPLASTY Right 11/12/2019    Procedure: RIGHT DIRECT ANTERIOR TOTAL HIP ARTHROPLASTY;  Surgeon: Collin Perez MD;  Location: [facility];  Service: Orthopedics      Social History     Tobacco Use   ? Smoking status: Never Smoker   ? Smokeless tobacco: Never Used   Substance Use Topics   ? Alcohol use: Yes     Alcohol/week: 5.0 standard drinks     Types: 5 Glasses of wine per week   ? Drug use: No        Family History   Problem Relation Age of Onset   ? Osteoporosis Mother    ? Dementia Mother    ? Heart attack Father    ? Alzheimer's disease Father    ? Clotting disorder Brother    ? Diabetes Neg Hx        Vitals:    02/03/20 1807   BP: 116/68   Patient Site: Right Arm   Patient Position: Sitting   Cuff Size: Adult Regular   Pulse: 97   Resp: 16   Temp: (!) 102  F (38.9  C)   TempSrc: Oral   SpO2: 97%   Weight: 140 lb (63.5 kg)       EXAM:   General: Vital signs reviewed. Patient is in no acute appearing distress with a rare cough. Breathing is non labored appearing. Patient is alert  and oriented x 3.   ENT: Tympanic membranes are clear and without injection bilaterally, nasal turbinates show mild injection and edema, with no rhinorrhea, no pharyngeal injection or exudate.  Neck: supple with no adenoapthy.  Heart: Heart rate is regular without murmur.  Lungs: Lungs are clear to auscultation with good airflow bilaterally.  Skin: Febrile and dry.  Recent Results (from the past 48 hour(s))   Influenza A/B Rapid Test- Nasal Swab   Result Value Ref Range    Influenza  A, Rapid Antigen Influenza A antigen detected (!) No Influenza A antigen detected    Influenza B, Rapid Antigen No Influenza B antigen detected No Influenza B antigen detected   Results from exam reviewed with patient, with the influenza testing being positive for influenza A.    Assessment/Plan   1. Flu-like symptoms  Influenza A/B Rapid Test- Nasal Swab    ibuprofen tablet 400 mg (ADVIL,MOTRIN)    ibuprofen (ADVIL,MOTRIN) 400 MG tablet   2. Influenza A  oseltamivir (TAMIFLU) 75 MG capsule       Patient Instructions   See after visit summary hand out for useful information.     Patient Education     Influenza (Adult)    Influenza is also called the flu. It is a viral illness that affects the air passages of your lungs. It is different from the common cold. The flu can easily be passed from one to person to another. It may be spread through the air by coughing and sneezing. Or it can be spread by touching the sick person and then touching your own eyes, nose, or mouth.  The flu starts 1 to 3 days after you are exposed to the flu virus. It may last for 1 to 2 weeks but many people feel tired or fatigued for many weeks afterward. You usually dont need to take antibiotics unless you have a complication. This might be an ear or sinus infection or pneumonia.  Symptoms of the flu may be mild or severe. They can include extreme tiredness (wanting to stay in bed all day), chills, fevers, muscle aches, soreness with eye movement, headache, and a  dry, hacking cough.  Home care  Follow these guidelines when caring for yourself at home:    Avoid being around cigarette smoke, whether yours or other peoples.    Acetaminophen or ibuprofen will help ease your fever, muscle aches, and headache. Dont give aspirin to anyone younger than 18 who has the flu. Aspirin can harm the liver.    Nausea and loss of appetite are common with the flu. Eat light meals. Drink 6 to 8 glasses of liquids every day. Good choices are water, sport drinks, soft drinks without caffeine, juices, tea, and soup. Extra fluids will also help loosen secretions in your nose and lungs.    Over-the-counter cold medicines will not make the flu go away faster. But the medicines may help with coughing, sore throat, and congestion in your nose and sinuses. Dont use a decongestant if you have high blood pressure.    Stay home until your fever has been gone for at least 24 hours without using medicine to reduce fever.  Follow-up care  Follow up with your healthcare provider, or as advised, if you are not getting better over the next week.  If you are age 65 or older, talk with your provider about getting a pneumococcal vaccine every 5 years. You should also get this vaccine if you have chronic asthma or COPD. All adults should get a flu vaccine every fall. Ask your provider about this.  When to seek medical advice  Call your healthcare provider right away if any of these occur:    Cough with lots of colored mucus (sputum) or blood in your mucus    Chest pain, shortness of breath, wheezing, or trouble breathing    Severe headache, or face, neck, or ear pain    New rash with fever    Fever of 100.4 F (38 C) or higher, or as directed by your healthcare provider    Confusion, behavior change, or seizure    Severe weakness or dizziness    You get a new fever or cough after getting better for a few days  Date Last Reviewed: 1/1/2017 2000-2017 [employer]. [address], [place]  [address]. All rights reserved. This information is not intended as a substitute for professional medical care. Always follow your healthcare professional's instructions.              Javier Landaverde, DO

## 2021-07-04 NOTE — PROGRESS NOTES
Progress Notes by Itzel Hanson MD at 5/26/2021  8:50 AM     Author: Itzel Hanson MD Service: -- Author Type: Physician    Filed: 5/26/2021  9:39 AM Encounter Date: 5/26/2021 Status: Signed    : Itzel Hanson MD (Physician)           Thank you, Dr. Vásquez, for asking us to see Rios Michael at the [facility].      Assessment/Recommendations   Assessment:    1.  Coronary artery disease: Status post PCI of RCA in 2012.  Stress test done in 2017- for inducible ischemia.  No complaints of chest pain.  Continue on aspirin.  2.    Dyslipidemia:  Lipids from 10/8/2020 are reviewed and very well controlled. Recommend staying on simvastatin 40 mg daily.  3.    Hypertension: Blood pressure remains well controlled.     Follow-up in 1 year.     History of Present Illness    Ms. Rios Michael is a 75 y.o. female with history of coronary artery disease status post PCI of her RCA in February 2012 with preserved left ventricular systolic function, hypertension and dyslipidemia who I am seeing today in follow up.  She has 8 grandchildren and has been active with them.  She does light weightlifting.  Denies any problems with chest pain or breathing difficulty.       Physical Examination Review of Systems   Vitals:    05/26/21 0851   BP: 116/60   Pulse: 72   Resp: 16     Body mass index is 26.21 kg/m .  Wt Readings from Last 3 Encounters:   05/26/21 141 lb (64 kg)   10/08/20 138 lb (62.6 kg)   03/05/20 142 lb (64.4 kg)       General Appearance:   alert, no apparent distress   HEENT:  no scleral icterus; the mucous membranes are pink and moist                                  Neck:  No JVD   Chest: the spine is straight and the chest is symmetric   Lungs:   respirations unlabored; the lungs are clear to auscultation   Cardiovascular:   regular rhythm with normal first and second heart sounds and no murmurs or gallops   Abdomen:  no organomegaly, masses, bruits, or  tenderness; bowel sounds are present   Extremities: no edema   Skin: no xanthelasma    General: WNL  Eyes: WNL  Ears/Nose/Throat: WNL  Lungs: WNL  Heart: WNL  Stomach: WNL  Bladder: WNL  Muscle/Joints: Joint Pain  Skin: WNL  Nervous System: WNL  Mental Health: WNL     Blood: WNL     Medical History  Surgical History Family History Social History   Past Medical History:   Diagnosis Date   ? Arthritis    ? Breast cyst [id number]   ? Coronary artery disease    ? Depression    ? Disease of thyroid gland    ? Family history of clotting disorder    ? Herpes Simplex Type I     Created by Conversion  Replacement Utility updated for latest IMO load   ? High cholesterol    ? Hypertension    ? Status post hip replacement, right 11/12/2019    Past Surgical History:   Procedure Laterality Date   ? BREAST CYST EXCISION  [id number]    Removed from [facility]   ? CARDIAC CATHETERIZATION     ? CORONARY STENT PLACEMENT     ? [place] INSERT INTRACORONARY STENT      Description: Cath Stent Placement;  Recorded: 06/11/2012;  Comments: ADRIANA to mid RCA   ? [place] PUNC/ASPIR BREAST CYST      Description: Breast Surgery Puncture Aspiration Of Cyst;  Recorded: 07/29/2008;   ? [place] TOTAL HIP ARTHROPLASTY Right 11/12/2019    Procedure: RIGHT DIRECT ANTERIOR TOTAL HIP ARTHROPLASTY;  Surgeon: Collin Perez MD;  Location: [facility];  Service: Orthopedics    Family History   Problem Relation Age of Onset   ? Osteoporosis Mother    ? Dementia Mother    ? Heart attack Father    ? Alzheimer's disease Father    ? Clotting disorder Brother    ? Diabetes Neg Hx     Social History     Socioeconomic History   ? Marital status: Single     Spouse name: Not on file   ? Number of children: 4   ? Years of education: Not on file   ? Highest education level: Not on file   Occupational History   ? 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 Financial resource strain: Not on file   ? Food insecurity     Worry: Not on file     Inability: Not on  file   ? Transportation needs     Medical: Not on file     Non-medical: Not on file   Tobacco Use   ? Smoking status: Never Smoker   ? Smokeless tobacco: Never Used   Substance and Sexual Activity   ? Alcohol use: Yes     Alcohol/week: 5.0 standard drinks     Types: 5 Glasses of wine per week   ? Drug use: No   ? Sexual activity: Not on file   Lifestyle   ? Physical activity     Days per week: Not on file     Minutes per session: Not on file   ? Stress: Not on file   Relationships   ? Social connections     Talks on phone: Not on file     Gets together: Not on file     Attends Quaker service: Not on file     Active member of club or organization: Not on file     Attends meetings of clubs or organizations: Not on file     Relationship status: Not on file   ? Intimate partner violence     Fear of current or ex partner: Not on file     Emotionally abused: Not on file     Physically abused: Not on file     Forced sexual activity: Not on file   Other Topics Concern   ? Not on file   Social History Narrative    She has 4 children, one in [place], others in the [place].  She has 8 grandchildren.           Medications  Allergies   Current Outpatient Medications   Medication Sig Dispense Refill   ? aspirin 81 MG EC tablet Take 1 tablet (81 mg total) by mouth daily.  0   ? calcium citrate-vitamin D3 (CITRACAL + D) 315-250 mg-unit per tablet Take 1 tablet by mouth daily.     ? cholecalciferol, vitamin D3, (VITAMIN D3) 1,000 unit capsule Take 1,000 Units by mouth daily.            ? levothyroxine (SYNTHROID, LEVOTHROID) 75 MCG tablet Take 1 tablet (75 mcg total) by mouth daily. 90 tablet 3   ? metoprolol succinate (TOPROL-XL) 25 MG TAKE 1 TABLET BY MOUTH EVERY DAY 90 tablet 3   ? multivitamin therapeutic (THERAGRAN) tablet Take 1 tablet by mouth daily. 90 tablet 1   ? sertraline (ZOLOFT) 25 MG tablet TAKE 3 TABLETS BY MOUTH EVERY  tablet 3   ? simvastatin (ZOCOR) 40 MG tablet TAKE 1 TABLET BY MOUTH EVERYDAY AT  BEDTIME 90 tablet 3   ? valACYclovir (VALTREX) 1000 MG tablet TAKE 1 TABLET BY MOUTH TWICE A DAY FOR FLARE UPS 14 tablet 1   ? zinc gluconate 50 mg tablet Take 50 mg by mouth daily.       No current facility-administered medications for this visit.       Allergies   Allergen Reactions   ? Estradiol Hives   ? Evista [Raloxifene] Hives         Lab Results    Chemistry/lipid CBC Cardiac Enzymes/BNP/TSH/INR   Lab Results   Component Value Date    CHOL 138 10/08/2020    HDL 65 10/08/2020    LDLCALC 61 10/08/2020    TRIG 59 10/08/2020    CREATININE 0.87 10/08/2020    BUN 18 10/08/2020    K 4.6 10/08/2020     10/08/2020     10/08/2020    CO2 30 10/08/2020    Lab Results   Component Value Date    WBC 4.0 10/08/2020    HGB 14.4 10/08/2020    HCT 43.3 10/08/2020    MCV 97 10/08/2020     10/08/2020    Lab Results   Component Value Date    TROPONINI <0.01 05/24/2012    TSH 1.14 10/08/2020    INR 1.10 11/12/2019

## 2021-07-06 VITALS
BODY MASS INDEX: 25.95 KG/M2 | RESPIRATION RATE: 16 BRPM | HEART RATE: 72 BPM | DIASTOLIC BLOOD PRESSURE: 60 MMHG | SYSTOLIC BLOOD PRESSURE: 116 MMHG | HEIGHT: 62 IN | WEIGHT: 141 LBS

## 2021-07-06 VITALS
DIASTOLIC BLOOD PRESSURE: 60 MMHG | SYSTOLIC BLOOD PRESSURE: 110 MMHG | HEART RATE: 64 BPM | WEIGHT: 138.5 LBS | BODY MASS INDEX: 25.75 KG/M2

## 2021-07-13 ENCOUNTER — RECORDS - HEALTHEAST (OUTPATIENT)
Dept: ADMINISTRATIVE | Facility: CLINIC | Age: 76
End: 2021-07-13

## 2021-07-21 ENCOUNTER — RECORDS - HEALTHEAST (OUTPATIENT)
Dept: ADMINISTRATIVE | Facility: CLINIC | Age: 76
End: 2021-07-21

## 2021-07-26 ENCOUNTER — TELEPHONE (OUTPATIENT)
Dept: CARDIOLOGY | Facility: CLINIC | Age: 76
End: 2021-07-26

## 2021-07-26 NOTE — TELEPHONE ENCOUNTER
I'd like to see her in follow-up for my next available.  If occurs again and persists should go to the ED

## 2021-07-26 NOTE — TELEPHONE ENCOUNTER
"Return call to patient who reported that she had \"similar sx, but not as intense, that she experienced in 2012 prior to having a stent\" - patient explained that she was just standing yesterday, was not exerting herself, when she developed brief tingling in left arm - sx in 2012 occurred when she was exerting herself (pushing stroller and lawnmower) - patient denied any additional sx like dyspnea, dizziness, CP, nausea, flushing/diaphoresis.    Informed patient that update would be forwarded to Dr. Hanson for recommendations - understanding verbalized.    Please review patient update - yrly f/u pending 5/2022 - any new orders?  mg  "

## 2021-07-26 NOTE — TELEPHONE ENCOUNTER
Return call to patient - informed her of Dr. Hanson's response/recommendations - patient verbalized understanding after questions addressed and agreed to sched next available f/u - call transf to sched.  mg

## 2021-07-26 NOTE — TELEPHONE ENCOUNTER
----- Message from Concepc[facility] House sent at 7/26/2021  2:02 PM CDT -----  Regarding: [facility] pt  General phone call:    Caller: Danny   Primary cardiologist: [facility]  Detailed reason for call: 7/25 she was golfing and she felt numbness in her left arms and lasted 3 seconds   Best phone number: [phone]  Best time to contact: any  Ok to leave a detailed message? yes  Device? no  Additional Info:

## 2021-08-02 ENCOUNTER — TELEPHONE (OUTPATIENT)
Dept: INTERNAL MEDICINE | Facility: CLINIC | Age: 76
End: 2021-08-02

## 2021-08-02 DIAGNOSIS — Z20.822 EXPOSURE TO COVID-19 VIRUS: Primary | ICD-10-CM

## 2021-08-02 NOTE — TELEPHONE ENCOUNTER
Reason for Call:  Other    Detailed comments: PT called and stated she has been exposed to covid-19. Pt states her 1 year old granddaughter tested positive today. PT states she was with her granddaughter on Friday and was kissing, hugging and loving on her. PT states she is vaccinated but has heard of people getting covid-19 even while being vaccinated. Please call Pt back to discuss her questions/concerns.    Phone Number Patient can be reached at: Home number on file [phone] (home)    Best Time: ANY    Can we leave a detailed message on this number? YES    Call taken on 8/2/2021 at 2:52 PM by Cayla Michael

## 2021-08-03 NOTE — TELEPHONE ENCOUNTER
She should have a Covid test, because of her exposure to somebody who is Covid positive.    Elaine Vásquez MD

## 2021-08-23 ENCOUNTER — OFFICE VISIT (OUTPATIENT)
Dept: CARDIOLOGY | Facility: CLINIC | Age: 76
End: 2021-08-23
Payer: COMMERCIAL

## 2021-08-23 VITALS
HEART RATE: 69 BPM | DIASTOLIC BLOOD PRESSURE: 64 MMHG | SYSTOLIC BLOOD PRESSURE: 112 MMHG | RESPIRATION RATE: 12 BRPM | WEIGHT: 140.5 LBS | HEIGHT: 62 IN | BODY MASS INDEX: 25.86 KG/M2

## 2021-08-23 DIAGNOSIS — R07.89 OTHER CHEST PAIN: Primary | ICD-10-CM

## 2021-08-23 PROCEDURE — 99214 OFFICE O/P EST MOD 30 MIN: CPT | Performed by: INTERNAL MEDICINE

## 2021-08-23 ASSESSMENT — MIFFLIN-ST. JEOR: SCORE: 1082.61

## 2021-08-23 NOTE — LETTER
"8/23/2021    Elaine Vásquez MD  [address] [place] [address]    RE: Rios Michael       Dear Colleague,    I had the pleasure of seeing Rios Michael in the [facility].      HEART CARE ENCOUNTER CONSULTATON NOTE      [facility]  [phone]         Assessment:    1.  Coronary artery disease: Status post PCI of RCA in 2012.  Patient had a episode of left arm discomfort while golfing without recurrence.  We will proceed with Lexiscan nuclear stress testing for further assessment.  2.    Dyslipidemia: Lipids well controlled continue on current dose of Zocor.  3.    Hypertension: Blood pressure remains well controlled.     Follow-up in 1 year stress test results.     History of Present Illness/Subjective    HPI: Rios Michael is a 75 year old female with history of coronary artery disease status post PCI of her RCA in February 2012 with preserved left ventricular systolic function, hypertension and dyslipidemia who I am seeing today in follow up.  I last saw her in May and she been doing well at that time.  She is very active for her age.  She was out golfing and noted she had a brief episode of left arm weakness where her arm felt weak for about 6 seconds.  No recurrence and she has golf since that time without issues.  With her heart attack in 2012 she experienced left arm discomfort.         Physical Examination  Review of Systems   Vitals: /64 (BP Location: Right arm, Patient Position: Sitting, Cuff Size: Adult Regular)   Pulse 69   Resp 12   Ht 1.562 m (5' 1.5\")   Wt 63.7 kg (140 lb 8 oz)   BMI 26.12 kg/m    BMI= Body mass index is 26.12 kg/m .  Wt Readings from Last 3 Encounters:   08/23/21 63.7 kg (140 lb 8 oz)   06/15/21 62.8 kg (138 lb 8 oz)   05/26/21 64 kg (141 lb)       General Appearance:   no distress, normal body habitus   ENT/Mouth: membranes moist, no oral lesions or bleeding gums.      EYES:  no " scleral icterus, normal conjunctivae   Neck: no carotid bruits or thyromegaly   Chest/Lungs:   lungs are clear to auscultation, no rales or wheezing   Cardiovascular:   Regular. Normal first and second heart sounds withno murmursno edema bilaterally    Abdomen:  no organomegaly, masses, bruits, or tenderness; bowel sounds are present   Extremities: no cyanosis or clubbing   Skin: no xanthelasma, warm.    Neurologic: normal  bilateral, no tremors     Psychiatric: alert and oriented x3, calm        Please refer above for cardiac ROS details.        Medical History  Surgical History Family History Social History   Past Medical History:   Diagnosis Date     Arthritis      Breast cyst [id number]     Coronary artery disease      Depression      Disease of thyroid gland      Family history of clotting disorder      Herpes simplex virus (HSV) infection     Created by Conversion  Replacement Utility updated for latest IMO load     High cholesterol      Hypertension      Status post hip replacement, right 11/12/2019     Past Surgical History:   Procedure Laterality Date     BREAST CYST EXCISION  [id number]    Removed from [facility] area     C TOTAL HIP ARTHROPLASTY Right 11/12/2019    Procedure: RIGHT DIRECT ANTERIOR TOTAL HIP ARTHROPLASTY;  Surgeon: Collin Perez MD;  Location: [facility];  Service: Orthopedics     CARDIAC CATHETERIZATION       CORONARY STENT PLACEMENT       [place] PUNC/ASPIR BREAST CYST      Description: Breast Surgery Puncture Aspiration Of Cyst;  Recorded: 07/29/2008;     [facility] CORONARY STENT PERCUT, INITIAL VESSEL      Description: Cath Stent Placement;  Recorded: 06/11/2012;  Comments: ADRIANA to mid RCA     Family History   Problem Relation Age of Onset     Osteoporosis Mother      Dementia Mother      Coronary Artery Disease Father      Alzheimer Disease Father      Clotting Disorder Brother      Diabetes No family hx of         Social History     Socioeconomic History     Marital status: Single      Spouse name: Not on file     Number of children: 4     Years of education: Not on file     Highest education level: Not on file   Occupational History     Not on file   Tobacco Use     Smoking status: Never Smoker     Smokeless tobacco: Never Used   Substance and Sexual Activity     Alcohol use: Yes     Alcohol/week: 5.0 standard drinks     Drug use: No     Sexual activity: Not on file   Other Topics Concern     Not on file   Social History Narrative    She has 4 children, one in [place], others in the [place].  She has 8 grandchildren.      Social Determinants of Health     Financial Resource Strain:      Difficulty of Paying Living Expenses:    Food Insecurity:      Worried About Running Out of Food in the Last Year:      Ran Out of Food in the Last Year:    Transportation Needs:      Lack of Transportation (Medical):      Lack of Transportation (Non-Medical):    Physical Activity:      Days of Exercise per Week:      Minutes of Exercise per Session:    Stress:      Feeling of Stress :    Social Connections:      Frequency of Communication with Friends and Family:      Frequency of Social Gatherings with Friends and Family:      Attends Quaker Services:      Active Member of Clubs or Organizations:      Attends Club or Organization Meetings:      Marital Status:    Intimate Partner Violence:      Fear of Current or Ex-Partner:      Emotionally Abused:      Physically Abused:      Sexually Abused:            Medications  Allergies   Current Outpatient Medications   Medication Sig Dispense Refill     aspirin 81 MG EC tablet [ASPIRIN 81 MG EC TABLET] Take 1 tablet (81 mg total) by mouth daily.  0     calcium citrate-vitamin D3 (CITRACAL + D) 315-250 mg-unit per tablet [CALCIUM CITRATE-VITAMIN D3 (CITRACAL + D) 315-250 MG-UNIT PER TABLET] Take 1 tablet by mouth daily.       cholecalciferol, vitamin D3, (VITAMIN D3) 1,000 unit capsule [CHOLECALCIFEROL, VITAMIN D3, (VITAMIN D3) 1,000 UNIT CAPSULE] Take 1,000  Units by mouth daily.              levothyroxine (SYNTHROID, LEVOTHROID) 75 MCG tablet [LEVOTHYROXINE (SYNTHROID, LEVOTHROID) 75 MCG TABLET] Take 1 tablet (75 mcg total) by mouth daily. 90 tablet 3     metoprolol succinate (TOPROL-XL) 25 MG [METOPROLOL SUCCINATE (TOPROL-XL) 25 MG] TAKE 1 TABLET BY MOUTH EVERY DAY 90 tablet 3     multivitamin therapeutic (THERAGRAN) tablet [MULTIVITAMIN THERAPEUTIC (THERAGRAN) TABLET] Take 1 tablet by mouth daily. 90 tablet 1     sertraline (ZOLOFT) 25 MG tablet [SERTRALINE (ZOLOFT) 25 MG TABLET] TAKE 3 TABLETS BY MOUTH EVERY  tablet 3     simvastatin (ZOCOR) 40 MG tablet [SIMVASTATIN (ZOCOR) 40 MG TABLET] TAKE 1 TABLET BY MOUTH EVERYDAY AT BEDTIME 90 tablet 3     valACYclovir (VALTREX) 1000 MG tablet [VALACYCLOVIR (VALTREX) 1000 MG TABLET] TAKE 1 TABLET BY MOUTH TWICE A DAY FOR FLARE UPS 14 tablet 1     zinc gluconate 50 mg tablet [ZINC GLUCONATE 50 MG TABLET] Take 50 mg by mouth daily.         Allergies   Allergen Reactions     Estradiol Hives     Evista [Raloxifene] Hives          Lab Results    Chemistry/lipid CBC Cardiac Enzymes/BNP/TSH/INR   Recent Labs   Lab Test 10/08/20  1011   CHOL 138   HDL 65   LDL 61   TRIG 59     Recent Labs   Lab Test 10/08/20  1011 06/25/19  0728 05/24/18  1115   LDL 61 82 79     Recent Labs   Lab Test 10/08/20  1011      POTASSIUM 4.6   CHLORIDE 103   CO2 30      BUN 18   CR 0.87   GFRESTIMATED >60   ALEX 9.9     Recent Labs   Lab Test 10/08/20  1011 11/13/19  0716 10/28/19  0950   CR 0.87 0.74 0.88     Recent Labs   Lab Test 08/01/13  0804   A1C 5.9          Recent Labs   Lab Test 10/08/20  1011   WBC 4.0   HGB 14.4   HCT 43.3   MCV 97        Recent Labs   Lab Test 10/08/20  1011 11/13/19  0552 10/28/19  0950   HGB 14.4 9.1* 13.6    No results for input(s): TROPONINI in the last 50164 hours.  No results for input(s): BNP, NTBNPI, NTBNP in the last 28490 hours.  Recent Labs   Lab Test 10/08/20  1011   TSH 1.14     Recent  Labs   Lab Test 11/12/19  0745   INR 1.10        Itzel Hanson MD                                          Thank you for allowing me to participate in the care of your patient.      Sincerely,     Itzel Hanson MD     [facility]  cc:   No referring provider defined for this encounter.

## 2021-08-23 NOTE — PROGRESS NOTES
"  HEART CARE ENCOUNTER CONSULTATON NOTE      [facility]  [phone]         Assessment:    1.  Coronary artery disease: Status post PCI of RCA in 2012.  Patient had a episode of left arm discomfort while golfing without recurrence.  We will proceed with Lexiscan nuclear stress testing for further assessment.  2.    Dyslipidemia: Lipids well controlled continue on current dose of Zocor.  3.    Hypertension: Blood pressure remains well controlled.     Follow-up in 1 year stress test results.     History of Present Illness/Subjective    HPI: Rios Michael is a 75 year old female with history of coronary artery disease status post PCI of her RCA in February 2012 with preserved left ventricular systolic function, hypertension and dyslipidemia who I am seeing today in follow up.  I last saw her in May and she been doing well at that time.  She is very active for her age.  She was out golfing and noted she had a brief episode of left arm weakness where her arm felt weak for about 6 seconds.  No recurrence and she has golf since that time without issues.  With her heart attack in 2012 she experienced left arm discomfort.         Physical Examination  Review of Systems   Vitals: /64 (BP Location: Right arm, Patient Position: Sitting, Cuff Size: Adult Regular)   Pulse 69   Resp 12   Ht 1.562 m (5' 1.5\")   Wt 63.7 kg (140 lb 8 oz)   BMI 26.12 kg/m    BMI= Body mass index is 26.12 kg/m .  Wt Readings from Last 3 Encounters:   08/23/21 63.7 kg (140 lb 8 oz)   06/15/21 62.8 kg (138 lb 8 oz)   05/26/21 64 kg (141 lb)       General Appearance:   no distress, normal body habitus   ENT/Mouth: membranes moist, no oral lesions or bleeding gums.      EYES:  no scleral icterus, normal conjunctivae   Neck: no carotid bruits or thyromegaly   Chest/Lungs:   lungs are clear to auscultation, no rales or wheezing   Cardiovascular:   Regular. Normal first and second heart sounds withno murmursno edema " bilaterally    Abdomen:  no organomegaly, masses, bruits, or tenderness; bowel sounds are present   Extremities: no cyanosis or clubbing   Skin: no xanthelasma, warm.    Neurologic: normal  bilateral, no tremors     Psychiatric: alert and oriented x3, calm        Please refer above for cardiac ROS details.        Medical History  Surgical History Family History Social History   Past Medical History:   Diagnosis Date     Arthritis      Breast cyst [id number]     Coronary artery disease      Depression      Disease of thyroid gland      Family history of clotting disorder      Herpes simplex virus (HSV) infection     Created by Conversion  Replacement Utility updated for latest IMO load     High cholesterol      Hypertension      Status post hip replacement, right 11/12/2019     Past Surgical History:   Procedure Laterality Date     BREAST CYST EXCISION  [id number]    Removed from [facility]     C TOTAL HIP ARTHROPLASTY Right 11/12/2019    Procedure: RIGHT DIRECT ANTERIOR TOTAL HIP ARTHROPLASTY;  Surgeon: Collin Perez MD;  Location: [facility];  Service: Orthopedics     CARDIAC CATHETERIZATION       CORONARY STENT PLACEMENT       LA PUNC/ASPIR BREAST CYST      Description: Breast Surgery Puncture Aspiration Of Cyst;  Recorded: 07/29/2008;     [facility] CORONARY STENT PERCUT, INITIAL VESSEL      Description: Cath Stent Placement;  Recorded: 06/11/2012;  Comments: ADRIANA to mid RCA     Family History   Problem Relation Age of Onset     Osteoporosis Mother      Dementia Mother      Coronary Artery Disease Father      Alzheimer Disease Father      Clotting Disorder Brother      Diabetes No family hx of         Social History     Socioeconomic History     Marital status: Single     Spouse name: Not on file     Number of children: 4     Years of education: Not on file     Highest education level: Not on file   Occupational History     Not on file   Tobacco Use     Smoking status: Never Smoker     Smokeless tobacco:  Never Used   Substance and Sexual Activity     Alcohol use: Yes     Alcohol/week: 5.0 standard drinks     Drug use: No     Sexual activity: Not on file   Other Topics Concern     Not on file   Social History Narrative    She has 4 children, one in [place], others in the [place].  She has 8 grandchildren.      Social Determinants of Health     Financial Resource Strain:      Difficulty of Paying Living Expenses:    Food Insecurity:      Worried About Running Out of Food in the Last Year:      Ran Out of Food in the Last Year:    Transportation Needs:      Lack of Transportation (Medical):      Lack of Transportation (Non-Medical):    Physical Activity:      Days of Exercise per Week:      Minutes of Exercise per Session:    Stress:      Feeling of Stress :    Social Connections:      Frequency of Communication with Friends and Family:      Frequency of Social Gatherings with Friends and Family:      Attends [employer] Services:      Active Member of Clubs or Organizations:      Attends Club or Organization Meetings:      Marital Status:    Intimate Partner Violence:      Fear of Current or Ex-Partner:      Emotionally Abused:      Physically Abused:      Sexually Abused:            Medications  Allergies   Current Outpatient Medications   Medication Sig Dispense Refill     aspirin 81 MG EC tablet [ASPIRIN 81 MG EC TABLET] Take 1 tablet (81 mg total) by mouth daily.  0     calcium citrate-vitamin D3 (CITRACAL + D) 315-250 mg-unit per tablet [CALCIUM CITRATE-VITAMIN D3 (CITRACAL + D) 315-250 MG-UNIT PER TABLET] Take 1 tablet by mouth daily.       cholecalciferol, vitamin D3, (VITAMIN D3) 1,000 unit capsule [CHOLECALCIFEROL, VITAMIN D3, (VITAMIN D3) 1,000 UNIT CAPSULE] Take 1,000 Units by mouth daily.              levothyroxine (SYNTHROID, LEVOTHROID) 75 MCG tablet [LEVOTHYROXINE (SYNTHROID, LEVOTHROID) 75 MCG TABLET] Take 1 tablet (75 mcg total) by mouth daily. 90 tablet 3     metoprolol succinate (TOPROL-XL) 25 MG  [METOPROLOL SUCCINATE (TOPROL-XL) 25 MG] TAKE 1 TABLET BY MOUTH EVERY DAY 90 tablet 3     multivitamin therapeutic (THERAGRAN) tablet [MULTIVITAMIN THERAPEUTIC (THERAGRAN) TABLET] Take 1 tablet by mouth daily. 90 tablet 1     sertraline (ZOLOFT) 25 MG tablet [SERTRALINE (ZOLOFT) 25 MG TABLET] TAKE 3 TABLETS BY MOUTH EVERY  tablet 3     simvastatin (ZOCOR) 40 MG tablet [SIMVASTATIN (ZOCOR) 40 MG TABLET] TAKE 1 TABLET BY MOUTH EVERYDAY AT BEDTIME 90 tablet 3     valACYclovir (VALTREX) 1000 MG tablet [VALACYCLOVIR (VALTREX) 1000 MG TABLET] TAKE 1 TABLET BY MOUTH TWICE A DAY FOR FLARE UPS 14 tablet 1     zinc gluconate 50 mg tablet [ZINC GLUCONATE 50 MG TABLET] Take 50 mg by mouth daily.         Allergies   Allergen Reactions     Estradiol Hives     Evista [Raloxifene] Hives          Lab Results    Chemistry/lipid CBC Cardiac Enzymes/BNP/TSH/INR   Recent Labs   Lab Test 10/08/20  1011   CHOL 138   HDL 65   LDL 61   TRIG 59     Recent Labs   Lab Test 10/08/20  1011 06/25/19  0728 05/24/18  1115   LDL 61 82 79     Recent Labs   Lab Test 10/08/20  1011      POTASSIUM 4.6   CHLORIDE 103   CO2 30      BUN 18   CR 0.87   GFRESTIMATED >60   ALEX 9.9     Recent Labs   Lab Test 10/08/20  1011 11/13/19  0716 10/28/19  0950   CR 0.87 0.74 0.88     Recent Labs   Lab Test 08/01/13  0804   A1C 5.9          Recent Labs   Lab Test 10/08/20  1011   WBC 4.0   HGB 14.4   HCT 43.3   MCV 97        Recent Labs   Lab Test 10/08/20  1011 11/13/19  0552 10/28/19  0950   HGB 14.4 9.1* 13.6    No results for input(s): TROPONINI in the last 57751 hours.  No results for input(s): BNP, NTBNPI, NTBNP in the last 60577 hours.  Recent Labs   Lab Test 10/08/20  1011   TSH 1.14     Recent Labs   Lab Test 11/12/19  0745   INR 1.10        Itzel Hanson MD

## 2021-08-30 ENCOUNTER — HOSPITAL ENCOUNTER (OUTPATIENT)
Dept: NUCLEAR MEDICINE | Facility: CLINIC | Age: 76
End: 2021-08-30
Attending: INTERNAL MEDICINE
Payer: COMMERCIAL

## 2021-08-30 ENCOUNTER — HOSPITAL ENCOUNTER (OUTPATIENT)
Dept: CARDIOLOGY | Facility: CLINIC | Age: 76
End: 2021-08-30
Attending: INTERNAL MEDICINE
Payer: COMMERCIAL

## 2021-08-30 DIAGNOSIS — I25.10 CORONARY ATHEROSCLEROSIS: Primary | ICD-10-CM

## 2021-08-30 LAB
CV STRESS CURRENT BP HE: NORMAL
CV STRESS CURRENT HR HE: 60
CV STRESS CURRENT HR HE: 64
CV STRESS CURRENT HR HE: 64
CV STRESS CURRENT HR HE: 65
CV STRESS CURRENT HR HE: 65
CV STRESS CURRENT HR HE: 66
CV STRESS CURRENT HR HE: 83
CV STRESS CURRENT HR HE: 84
CV STRESS CURRENT HR HE: 84
CV STRESS CURRENT HR HE: 87
CV STRESS CURRENT HR HE: 91
CV STRESS CURRENT HR HE: 94
CV STRESS CURRENT HR HE: 96
CV STRESS CURRENT HR HE: 96
CV STRESS CURRENT HR HE: 97
CV STRESS DEVIATION TIME HE: NORMAL
CV STRESS ECHO PERCENT HR HE: NORMAL
CV STRESS EXERCISE STAGE HE: NORMAL
CV STRESS FINAL RESTING BP HE: NORMAL
CV STRESS FINAL RESTING HR HE: 84
CV STRESS MAX HR HE: 100
CV STRESS MAX TREADMILL GRADE HE: 0
CV STRESS MAX TREADMILL SPEED HE: 0
CV STRESS PEAK DIA BP HE: NORMAL
CV STRESS PEAK SYS BP HE: NORMAL
CV STRESS PHASE HE: NORMAL
CV STRESS PROTOCOL HE: NORMAL
CV STRESS RESTING PT POSITION HE: NORMAL
CV STRESS RESTING PT POSITION HE: NORMAL
CV STRESS ST DEVIATION AMOUNT HE: NORMAL
CV STRESS ST DEVIATION ELEVATION HE: NORMAL
CV STRESS ST EVELATION AMOUNT HE: NORMAL
CV STRESS TEST TYPE HE: NORMAL
CV STRESS TOTAL STAGE TIME MIN 1 HE: NORMAL
RATE PRESSURE PRODUCT: NORMAL
STRESS ECHO BASELINE DIASTOLIC HE: 72
STRESS ECHO BASELINE HR: 61
STRESS ECHO BASELINE SYSTOLIC BP: 131
STRESS ECHO CALCULATED PERCENT HR: 68 %
STRESS ECHO LAST STRESS DIASTOLIC BP: 66
STRESS ECHO LAST STRESS HR: 94
STRESS ECHO LAST STRESS SYSTOLIC BP: 131
STRESS ECHO TARGET HR: 146

## 2021-08-30 PROCEDURE — 78452 HT MUSCLE IMAGE SPECT MULT: CPT | Mod: 26 | Performed by: INTERNAL MEDICINE

## 2021-08-30 PROCEDURE — A9500 TC99M SESTAMIBI: HCPCS | Performed by: INTERNAL MEDICINE

## 2021-08-30 PROCEDURE — 343N000001 HC RX 343: Performed by: INTERNAL MEDICINE

## 2021-08-30 PROCEDURE — 250N000011 HC RX IP 250 OP 636: Performed by: INTERNAL MEDICINE

## 2021-08-30 PROCEDURE — 93018 CV STRESS TEST I&R ONLY: CPT | Performed by: INTERNAL MEDICINE

## 2021-08-30 PROCEDURE — 93016 CV STRESS TEST SUPVJ ONLY: CPT | Performed by: INTERNAL MEDICINE

## 2021-08-30 PROCEDURE — 78452 HT MUSCLE IMAGE SPECT MULT: CPT

## 2021-08-30 PROCEDURE — 93017 CV STRESS TEST TRACING ONLY: CPT

## 2021-08-30 RX ORDER — ALBUTEROL SULFATE 0.83 MG/ML
2.5 SOLUTION RESPIRATORY (INHALATION)
Status: DISCONTINUED | OUTPATIENT
Start: 2021-08-30 | End: 2021-08-30 | Stop reason: HOSPADM

## 2021-08-30 RX ORDER — AMINOPHYLLINE 25 MG/ML
50 INJECTION, SOLUTION INTRAVENOUS
Status: DISCONTINUED | OUTPATIENT
Start: 2021-08-30 | End: 2021-08-30 | Stop reason: HOSPADM

## 2021-08-30 RX ORDER — CAFFEINE CITRATE 20 MG/ML
60 SOLUTION INTRAVENOUS
Status: DISCONTINUED | OUTPATIENT
Start: 2021-08-30 | End: 2021-08-30 | Stop reason: HOSPADM

## 2021-08-30 RX ORDER — CAFFEINE 200 MG
200 TABLET ORAL
Status: DISCONTINUED | OUTPATIENT
Start: 2021-08-30 | End: 2021-08-30 | Stop reason: HOSPADM

## 2021-08-30 RX ORDER — REGADENOSON 0.08 MG/ML
0.4 INJECTION, SOLUTION INTRAVENOUS ONCE
Status: COMPLETED | OUTPATIENT
Start: 2021-08-30 | End: 2021-08-30

## 2021-08-30 RX ADMIN — Medication 8.3 MILLICURIE: at 07:59

## 2021-08-30 RX ADMIN — REGADENOSON 0.4 MG: 0.08 INJECTION, SOLUTION INTRAVENOUS at 08:34

## 2021-08-30 RX ADMIN — Medication 31.3 MILLICURIE: at 08:45

## 2021-09-13 DIAGNOSIS — E03.9 HYPOTHYROID: ICD-10-CM

## 2021-09-13 DIAGNOSIS — E03.9 ACQUIRED HYPOTHYROIDISM: Primary | ICD-10-CM

## 2021-09-13 RX ORDER — LEVOTHYROXINE SODIUM 75 UG/1
75 TABLET ORAL DAILY
Qty: 90 TABLET | Refills: 3 | Status: SHIPPED | OUTPATIENT
Start: 2021-09-13 | End: 2022-09-06

## 2021-09-13 NOTE — TELEPHONE ENCOUNTER
Reason for Call:  Medication or medication refill: levothyroxine (SYNTHROID, LEVOTHROID) 75 MCG tablet    Do you use a [facility]? NO Name of the pharmacy and phone number for the [facility] [place] [address] in [place], [phone]    Name of the medication requested:     levothyroxine (SYNTHROID, LEVOTHROID) 75 MCG tablet  75 mcg, DAILY 3 ordered         Summary: [LEVOTHYROXINE (SYNTHROID, LEVOTHROID) 75 MCG TABLET] Take 1 tablet (75 mcg total) by mouth daily., Disp-90 tablet, R-3, Normal        Can we leave a detailed message on this number? YES    Phone number patient can be reached at: Cell number on file:    Telephone Information:   Mobile [phone]     Best Time: any    Call taken on 9/13/2021 at 1:23 PM by Cayla Michael

## 2021-09-17 ENCOUNTER — HOSPITAL ENCOUNTER (OUTPATIENT)
Dept: MAMMOGRAPHY | Facility: CLINIC | Age: 76
Discharge: HOME | End: 2021-09-17
Attending: INTERNAL MEDICINE | Admitting: INTERNAL MEDICINE
Payer: COMMERCIAL

## 2021-09-17 DIAGNOSIS — Z12.31 VISIT FOR SCREENING MAMMOGRAM: ICD-10-CM

## 2021-09-17 PROCEDURE — 77063 BREAST TOMOSYNTHESIS BI: CPT

## 2021-10-07 ENCOUNTER — NURSE TRIAGE (OUTPATIENT)
Dept: NURSING | Facility: CLINIC | Age: 76
End: 2021-10-07

## 2021-10-07 NOTE — TELEPHONE ENCOUNTER
"Patient calling, asking questions about 'immune compromised' and who should be getting the 3rd vaccine.    Patient advised on her question.    , and sent to scheduling to make an   Appointment.    Ria Mohan RN RN  [facility] Triage/refill nurse  COVID 19 Nurse Triage Plan/Patient Instructions    Please be aware that novel coronavirus (COVID-19) may be circulating in the community. If you develop symptoms such as fever, cough, or SOB or if you have concerns about the presence of another infection including coronavirus (COVID-19), please contact your health care provider or visit [email or web address].     Disposition/Instructions    Additional COVID19 information to add for patients.   How can I protect others?  If you have symptoms (fever, cough, body aches or trouble breathing): Stay home and away from others (self-isolate) until:    At least 10 days have passed since your symptoms started, And     You ve had no fever--and no medicine that reduces fever--for 1 full day (24 hours), And      Your other symptoms have resolved (gotten better).     If you don t have symptoms, but a test showed that you have COVID-19 (you tested positive):    Stay home and away from others (self-isolate). Follow the tips under \"How do I self-isolate?\" below for 10 days (20 days if you have a weak immune system).    You don't need to be retested for COVID-19 before going back to school or work. As long as you're fever-free and feeling better, you can go back to school, work and other activities after waiting the 10 or 20 days.     How do I self-isolate?    Stay in your own room, even for meals. Use your own bathroom if you can.     Stay away from others in your home. No hugging, kissing or shaking hands. No visitors.    Don t go to work, school or anywhere else.     Clean  high touch  surfaces often (doorknobs, counters, handles, etc.). Use a household cleaning spray or wipes. You ll find a full list on the EPA website:  " www.epa.gov/pesticide-registration/list-n-disinfectants-use-against-sars-cov-2.    Cover your mouth and nose with a mask, tissue or washcloth to avoid spreading germs.    Wash your hands and face often. Use soap and water.    Caregivers in these groups are at risk for severe illness due to COVID-19:  o People 65 years and older  o People who live in a nursing home or long-term care facility  o People with chronic disease (lung, heart, cancer, diabetes, kidney, liver, immunologic)  o People who have a weakened immune system, including those who:  - Are in cancer treatment  - Take medicine that weakens the immune system, such as corticosteroids  - Had a bone marrow or organ transplant  - Have an immune deficiency  - Have poorly controlled HIV or AIDS  - Are obese (body mass index of 40 or higher)  - Smoke regularly    Caregivers should wear gloves while washing dishes, handling laundry and cleaning bedrooms and bathrooms.    Use caution when washing and drying laundry: Don t shake dirty laundry, and use the warmest water setting that you can.    For more tips, go to www.cdc.gov/coronavirus/2019-ncov/downloads/10Things.pdf.    How can I take care of myself?  1. Get lots of rest. Drink extra fluids (unless a doctor has told you not to).     2. Take Tylenol (acetaminophen) for fever or pain. If you have liver or kidney problems, ask your family doctor if it s okay to take Tylenol.     Adults can take either:     650 mg (two 325 mg pills) every 4 to 6 hours, or     1,000 mg (two 500 mg pills) every 8 hours as needed.     Note: Don t take more than 3,000 mg in one day.   Acetaminophen is found in many medicines (both prescribed and over-the-counter medicines). Read all labels to be sure you don t take too much.     For children, check the Tylenol bottle for the right dose. The dose is based on the child s age or weight.    3. If you have other health problems (like cancer, heart failure, an organ transplant or severe  kidney disease): Call your specialty clinic if you don t feel better in the next 2 days.    4. Know when to call 911: Emergency warning signs include:    Trouble breathing or shortness of breath    Pain or pressure in the chest that doesn t go away    Feeling confused like you haven t felt before, or not being able to wake up    Bluish-colored lips or face    What are the symptoms of COVID-19?     The most common symptoms are cough, fever and trouble breathing.     Less common symptoms include body aches, chills, diarrhea (loose, watery poops), fatigue (feeling very tired), headache, runny nose, sore throat and loss of smell.    COVID-19 can cause severe coughing (bronchitis) and lung infection (pneumonia).    How does it spread?     The virus may spread when a person coughs or sneezes into the air. The virus can travel about 6 feet this way, and it can live on surfaces.      Common  (household disinfectants) will kill the virus.    Who is at risk?  Anyone can catch COVID-19 if they re around someone who has the virus.    How can others protect themselves?     Stay away from people who have COVID-19 (or symptoms of COVID-19).    Wash hands often with soap and water. Or, use hand  with at least 60% alcohol.    Avoid touching the eyes, nose or mouth.     Wear a face mask when you go out in public, when sick or when caring for a sick person.    Where can I get more information?    [facility]: About COVID-19: [email or web address]    CDC: What to Do If You re Sick: www.cdc.gov/coronavirus/2019-ncov/about/steps-when-sick.html    CDC: Ending Home Isolation: www.cdc.gov/coronavirus/2019-ncov/hcp/disposition-in-home-patients.html     CDC: Caring for Someone: www.cdc.gov/coronavirus/2019-ncov/if-you-are-sick/care-for-someone.html     [facility]: Interim Guidance for Hospital Discharge to Home: www.health.[facility].[place]/diseases/coronavirus/hcp/hospdischarge.pdf    [facility]  trials (COVID-19 research studies): [email or web address]/umn-[email or web address]-[email or web address]     Below are the COVID-19 hotlines at the [place] Department of Health ([facility]). Interpreters are available.   o For health questions: Call 340-845-8859 or 1-779.158.4164 (7 a.m. to 7 p.m.)  o For questions about schools and childcare: Call 773-332-3328 or 1-289.475.7833 (7 a.m. to 7 p.m.)          Thank you for taking steps to prevent the spread of this virus.  o Limit your contact with others.  o Wear a simple mask to cover your cough.  o Wash your hands well and often.    Resources    [facility]: About COVID-19: [email or web address]    CDC: What to Do If You're Sick: www.cdc.gov/coronavirus/2019-ncov/about/steps-when-sick.html    CDC: Ending Home Isolation: www.cdc.gov/coronavirus/2019-ncov/hcp/disposition-in-home-patients.html     CDC: Caring for Someone: www.cdc.gov/coronavirus/2019-ncov/if-you-are-sick/care-for-someone.html     [facility]: Interim Guidance for Hospital Discharge to Home: www.[facility].mn./diseases/coronavirus/hcp/hospdischarge.pdf    [facility] clinical trials (COVID-19 research studies): [email or web address]/[email or web address]-[email or web address]     Below are the COVID-19 hotlines at the [place] Department of Health ([facility]). Interpreters are available.   o For health questions: Call 900-787-1657 or 1-644.714.5354 (7 a.m. to 7 p.m.)  o For questions about schools and childcare: Call 524-157-3026 or 1-959.535.7568 (7 a.m. to 7 p.m.)

## 2021-11-04 ENCOUNTER — MYC MEDICAL ADVICE (OUTPATIENT)
Dept: INTERNAL MEDICINE | Facility: CLINIC | Age: 76
End: 2021-11-04

## 2021-11-04 DIAGNOSIS — E78.5 HYPERLIPIDEMIA: ICD-10-CM

## 2021-11-04 DIAGNOSIS — E78.00 HYPERCHOLESTEROLEMIA: Primary | ICD-10-CM

## 2021-11-04 RX ORDER — SIMVASTATIN 40 MG
TABLET ORAL
Qty: 90 TABLET | Refills: 0 | Status: SHIPPED | OUTPATIENT
Start: 2021-11-04 | End: 2022-02-01

## 2021-11-04 NOTE — TELEPHONE ENCOUNTER
Refill Request  Medication name: Pending Prescriptions:                       Disp   Refills    simvastatin (ZOCOR) 40 MG tablet          90 tab*3            Sig: TAKE 1 TABLET BY MOUTH EVERYDAY AT BEDTIME    Who prescribed the medication: Neelam Brown MD (Previous PCP)  Last refill on medication: 11/5/20  [facility]  Last appointment with PCP: 3/9/21  Next appointment: Appointment scheduled for 12/8/21

## 2021-12-05 ASSESSMENT — ACTIVITIES OF DAILY LIVING (ADL): CURRENT_FUNCTION: NO ASSISTANCE NEEDED

## 2021-12-08 ENCOUNTER — OFFICE VISIT (OUTPATIENT)
Dept: INTERNAL MEDICINE | Facility: CLINIC | Age: 76
End: 2021-12-08
Payer: COMMERCIAL

## 2021-12-08 VITALS
HEIGHT: 62 IN | HEART RATE: 67 BPM | WEIGHT: 135.4 LBS | OXYGEN SATURATION: 95 % | DIASTOLIC BLOOD PRESSURE: 62 MMHG | SYSTOLIC BLOOD PRESSURE: 119 MMHG | BODY MASS INDEX: 24.92 KG/M2

## 2021-12-08 DIAGNOSIS — I25.10 CORONARY ARTERY DISEASE INVOLVING NATIVE CORONARY ARTERY OF NATIVE HEART WITHOUT ANGINA PECTORIS: ICD-10-CM

## 2021-12-08 DIAGNOSIS — E78.5 HYPERLIPIDEMIA LDL GOAL <70: ICD-10-CM

## 2021-12-08 DIAGNOSIS — E03.9 ACQUIRED HYPOTHYROIDISM: ICD-10-CM

## 2021-12-08 DIAGNOSIS — Z86.711 HISTORY OF PULMONARY EMBOLISM: ICD-10-CM

## 2021-12-08 DIAGNOSIS — M85.89 OSTEOPENIA OF MULTIPLE SITES: ICD-10-CM

## 2021-12-08 DIAGNOSIS — S61.219A CUT OF FINGER: ICD-10-CM

## 2021-12-08 DIAGNOSIS — Z00.00 ENCOUNTER FOR PREVENTIVE CARE: Primary | ICD-10-CM

## 2021-12-08 DIAGNOSIS — E55.9 VITAMIN D DEFICIENCY: ICD-10-CM

## 2021-12-08 DIAGNOSIS — I10 ESSENTIAL HYPERTENSION: ICD-10-CM

## 2021-12-08 DIAGNOSIS — Z23 NEED FOR TETANUS BOOSTER: ICD-10-CM

## 2021-12-08 LAB
ALT SERPL W P-5'-P-CCNC: 18 U/L (ref 0–45)
ANION GAP SERPL CALCULATED.3IONS-SCNC: 9 MMOL/L (ref 5–18)
AST SERPL W P-5'-P-CCNC: 23 U/L (ref 0–40)
BASOPHILS # BLD AUTO: 0 10E3/UL (ref 0–0.2)
BASOPHILS NFR BLD AUTO: 1 %
BUN SERPL-MCNC: 17 MG/DL (ref 8–28)
CALCIUM SERPL-MCNC: 9.9 MG/DL (ref 8.5–10.5)
CHLORIDE BLD-SCNC: 106 MMOL/L (ref 98–107)
CHOLEST SERPL-MCNC: 146 MG/DL
CO2 SERPL-SCNC: 28 MMOL/L (ref 22–31)
CREAT SERPL-MCNC: 0.85 MG/DL (ref 0.6–1.1)
EOSINOPHIL # BLD AUTO: 0.2 10E3/UL (ref 0–0.7)
EOSINOPHIL NFR BLD AUTO: 4 %
ERYTHROCYTE [DISTWIDTH] IN BLOOD BY AUTOMATED COUNT: 12.2 % (ref 10–15)
FASTING STATUS PATIENT QL REPORTED: YES
GFR SERPL CREATININE-BSD FRML MDRD: 68 ML/MIN/1.73M2
GLUCOSE BLD-MCNC: 103 MG/DL (ref 70–125)
HCT VFR BLD AUTO: 40.3 % (ref 35–47)
HDLC SERPL-MCNC: 62 MG/DL
HGB BLD-MCNC: 13.3 G/DL (ref 11.7–15.7)
IMM GRANULOCYTES # BLD: 0 10E3/UL
IMM GRANULOCYTES NFR BLD: 0 %
LDLC SERPL CALC-MCNC: 71 MG/DL
LYMPHOCYTES # BLD AUTO: 0.9 10E3/UL (ref 0.8–5.3)
LYMPHOCYTES NFR BLD AUTO: 21 %
MCH RBC QN AUTO: 32.5 PG (ref 26.5–33)
MCHC RBC AUTO-ENTMCNC: 33 G/DL (ref 31.5–36.5)
MCV RBC AUTO: 99 FL (ref 78–100)
MONOCYTES # BLD AUTO: 0.7 10E3/UL (ref 0–1.3)
MONOCYTES NFR BLD AUTO: 15 %
NEUTROPHILS # BLD AUTO: 2.6 10E3/UL (ref 1.6–8.3)
NEUTROPHILS NFR BLD AUTO: 59 %
PLATELET # BLD AUTO: 231 10E3/UL (ref 150–450)
POTASSIUM BLD-SCNC: 4.5 MMOL/L (ref 3.5–5)
RBC # BLD AUTO: 4.09 10E6/UL (ref 3.8–5.2)
SODIUM SERPL-SCNC: 143 MMOL/L (ref 136–145)
TRIGL SERPL-MCNC: 65 MG/DL
TSH SERPL DL<=0.005 MIU/L-ACNC: 1.19 UIU/ML (ref 0.3–5)
WBC # BLD AUTO: 4.3 10E3/UL (ref 4–11)

## 2021-12-08 PROCEDURE — 90471 IMMUNIZATION ADMIN: CPT | Performed by: INTERNAL MEDICINE

## 2021-12-08 PROCEDURE — 80048 BASIC METABOLIC PNL TOTAL CA: CPT | Performed by: INTERNAL MEDICINE

## 2021-12-08 PROCEDURE — 80061 LIPID PANEL: CPT | Performed by: INTERNAL MEDICINE

## 2021-12-08 PROCEDURE — 99397 PER PM REEVAL EST PAT 65+ YR: CPT | Mod: 25 | Performed by: INTERNAL MEDICINE

## 2021-12-08 PROCEDURE — 90714 TD VACC NO PRESV 7 YRS+ IM: CPT | Performed by: INTERNAL MEDICINE

## 2021-12-08 PROCEDURE — 84460 ALANINE AMINO (ALT) (SGPT): CPT | Performed by: INTERNAL MEDICINE

## 2021-12-08 PROCEDURE — 85025 COMPLETE CBC W/AUTO DIFF WBC: CPT | Performed by: INTERNAL MEDICINE

## 2021-12-08 PROCEDURE — 84443 ASSAY THYROID STIM HORMONE: CPT | Performed by: INTERNAL MEDICINE

## 2021-12-08 PROCEDURE — 99214 OFFICE O/P EST MOD 30 MIN: CPT | Mod: 25 | Performed by: INTERNAL MEDICINE

## 2021-12-08 PROCEDURE — 84450 TRANSFERASE (AST) (SGOT): CPT | Performed by: INTERNAL MEDICINE

## 2021-12-08 PROCEDURE — 36415 COLL VENOUS BLD VENIPUNCTURE: CPT | Performed by: INTERNAL MEDICINE

## 2021-12-08 ASSESSMENT — ACTIVITIES OF DAILY LIVING (ADL): CURRENT_FUNCTION: NO ASSISTANCE NEEDED

## 2021-12-08 ASSESSMENT — MIFFLIN-ST. JEOR: SCORE: 1059.48

## 2021-12-08 NOTE — PROGRESS NOTES
"SUBJECTIVE:   Rios Michael is a 75 year old female who presents for Preventive Visit.    Patient is here for preventative health care visit. I saw the patient March 9, 2021, at that time it was virtual.    \"Hypertension, previous blood pressures have been normal.  Today's visit is a video visit, so I do not have any blood pressure readings.  She is on a small dose of metoprolol, 25 mg daily, which is most likely for coronary artery disease.     Acquired hypothyroidism, patient is on levothyroxine 75 mcg daily, TSH was checked October 8, 2020, and this was 1.14.  Patient is also on biotin, and I will review whether this affects TSH results.     Other migraine without status migrainosus, not intractable, patient says this only occurred once, and she does not have any issues with this. Only happened once.      Raynaud's disease without gangrene, patient is on aspirin.  Does not complain of problems with this today.  No issues with this.      Osteopenia, last bone density test was done in June 27, 2019: L1-L4 T score -2.2, significant decline 6.8% compared to 2017.  Femoral neck T score -1, right total hip -1.4.  Hip fracture risk 2%, major osteoporotic fracture risk 12.5%.     Vitamin D deficiency, vitamin D 69.5 May 24, 2018.  Patient is on vitamin D 1000 units daily.     Atherosclerosis of coronary artery of native heart without angina pectoris, unspecified vessel or lesion type, patient said that she had a stress test, after she had a flushed feeling down her arm, while pushing her grandson up the street.  After that she had a stent put in her right artery, in 2012 [she says 70 percent stenosis].  Patient has no symptoms such as chest pain, shortness of breath.\"    Patient has been advised of split billing requirements and indicates understanding: Yes   Are you in the first 12 months of your Medicare coverage?  No    Healthy Habits:     In general, how would you rate your overall health?  Good    Frequency of " "exercise:  4-5 days/week    Duration of exercise:  15-30 minutes    Do you usually eat at least 4 servings of fruit and vegetables a day, include whole grains    & fiber and avoid regularly eating high fat or \"junk\" foods?  Yes    Taking medications regularly:  Yes    Medication side effects:  None    Ability to successfully perform activities of daily living:  No assistance needed    Home Safety:  No safety concerns identified    Hearing Impairment:  No hearing concerns    In the past 6 months, have you been bothered by leaking of urine? Yes    In general, how would you rate your overall mental or emotional health?  Good      PHQ-2 Total Score: 0    Additional concerns today:  Yes    Do you feel safe in your environment? Yes    Have you ever done Advance Care Planning? (For example, a Health Directive, POLST, or a discussion with a medical provider or your loved ones about your wishes): Yes, advance care planning is on file.    No concerns about hearing.    Fall risk  Fallen 2 or more times in the past year?: No  Any fall with injury in the past year?: No    Cognitive Screening   1) Repeat 3 items   2) Clock draw: NORMAL  3) 3 item recall: Recalls 3 objects  Results: NORMAL clock, 1-2 items recalled: COGNITIVE IMPAIRMENT LESS LIKELY     Do you have sleep apnea, excessive snoring or daytime drowsiness?: no    Reviewed and updated as needed this visit by clinical staff  Tobacco  Allergies  Meds           Reviewed and updated as needed this visit by Provider     Social History     Tobacco Use     Smoking status: Never Smoker     Smokeless tobacco: Never Used   Substance Use Topics     Alcohol use: Yes     Alcohol/week: 5.0 standard drinks       Alcohol Use 12/5/2021   Prescreen: >3 drinks/day or >7 drinks/week? No           Current providers sharing in care for this patient include:   Patient Care Team:  Elaine Vásquez MD as PCP - General  Elaine Vásquez MD as Assigned PCP  Itzel Hanson MD as " "Assigned Heart and Vascular Provider    The following health maintenance items are reviewed in Epic and correct as of today:  There are no preventive care reminders to display for this patient.    Review of Systems  Patient review of systems is negative.    OBJECTIVE:   /62   Pulse 67   Ht 1.562 m (5' 1.5\")   Wt 61.4 kg (135 lb 6.4 oz)   SpO2 95%   BMI 25.17 kg/m   Estimated body mass index is 25.17 kg/m  as calculated from the following:    Height as of this encounter: 1.562 m (5' 1.5\").    Weight as of this encounter: 61.4 kg (135 lb 6.4 oz).  Physical Exam   Patient is interactive, alert.  No cervical or supraclavicular lymphadenopathy.  Chest is clear, normal heart sounds, no murmurs.  No peripheral edema.      PHQ-9: 0, patient filled out this form today.     ASSESSMENT / PLAN:       ICD-10-CM    1. Encounter for preventive care, I have referred the patient for a bone density test, as the last time she had a bone density test done was in 2019, which showed osteopenia.  She is up-to-date with all other health maintenance.  Fasting blood tests are being done today.  Patient received the tetanus diphtheria vaccine today. Z00.00 CBC with platelets and differential     CBC with platelets and differential   2. Acquired hypothyroidism, patient is on levothyroxine 75 mcg daily. E03.9 TSH with free T4 reflex     TSH with free T4 reflex   3. Hyperlipidemia LDL goal <70, patient has coronary artery disease, had a stent placed in her heart in 2012.  She follows with Dr. Butcher, cardiology.  She is on simvastatin 40 mg daily. E78.5 Lipid Profile (Chol, Trig, HDL, LDL calc)     ALT     AST     Lipid Profile (Chol, Trig, HDL, LDL calc)     ALT     AST   4. Coronary artery disease involving native coronary artery of native heart without angina pectoris.  Previous coronary artery stenting in 2012 [one].  She is on simvastatin 40 mg daily, and is on aspirin 81 mg daily. This sis table, no chest pain. I25.10    5. " Essential hypertension, blood pressure is controlled.  Patient is on metoprolol 25 mg daily I10 Basic metabolic panel  (Ca, Cl, CO2, Creat, Gluc, K, Na, BUN)     Basic metabolic panel  (Ca, Cl, CO2, Creat, Gluc, K, Na, BUN)   6. Vitamin D deficiency, patient is on 1000 units of vitamin D daily. E55.9    7. Osteopenia of multiple sites, referral for another bone density test, as last one was done in 2019.  Patient tells me that she had right hip replacement in .  She sustained a fracture around her hip. M85.89 DX Hip/Pelvis/Spine   8. History of pulmonary embolism.  Family history of pulmonary embolism.  Her twin brother  of a pulmonary embolism at the age of 64 [he had a saddle embolism].  It also had clots before.  She has a son, who in his 30s was coughing up blood, and there is a possibility of this being a pulmonary embolism.  He is now 49. Z86.711    9. Need for tetanus booster, patient has a scrape, cuts on her left index finger, which has just happened.  Tetanus vaccine is needed, as this was last done more than 10 years ago, 2009. Z23 TD PRESERV FREE, IM (7+ YRS) (DECAVAC/TENIVAC)   10. Cut of finger  S61.209A TD PRESERV FREE, IM (7+ YRS) (DECAVAC/TENIVAC)     Patient states that she had cataract surgery in the summer.  No family history of cancer.  Father lived to the age of 92, he had Alzheimer's disease.  He had a slight heart attack when he was 86 years of age.  1 brother has had bypass surgery.  Another brother has had a pacemaker.    Patient is on zinc for geographic tongue.  She says her tongue is sensitive to movement.    History of pulmonary embolism as described above.  Patient states that she went to a hematologist and did have some testing.  Try to review for these results.    Patient has anxiety, OCD, and is on Zoloft 75 mg daily.  She also goes to therapy.    She has remained off biotin, so that this does not interfere with TSH testing.    Patient has been advised  "of split billing requirements and indicates understanding: Yes  COUNSELING:  Reviewed preventive health counseling, as reflected in patient instructions  Special attention given to:       Regular exercise       Healthy diet/nutrition       Osteoporosis prevention/bone health    Estimated body mass index is 25.17 kg/m  as calculated from the following:    Height as of this encounter: 1.562 m (5' 1.5\").    Weight as of this encounter: 61.4 kg (135 lb 6.4 oz).    BMI is 25.17, near normal.    She reports that she has never smoked. She has never used smokeless tobacco.  614 days  Appropriate preventive services were discussed with this patient, including applicable screening as appropriate for cardiovascular disease, diabetes, osteopenia/osteoporosis, and glaucoma.  As appropriate for age/gender, discussed screening for colorectal cancer, prostate cancer, breast cancer, and cervical cancer. Checklist reviewing preventive services available has been given to the patient.    Reviewed patients plan of care and provided an AVS. The Basic Care Plan (routine screening as documented in Health Maintenance) for Rios meets the Care Plan requirement. This Care Plan has been established and reviewed with the Patient.    Counseling Resources:  Results for BART SMITH (MRN [id number]) as of 12/8/2021 09:22   Ref. Range 3/20/2012 12:04   Anticardiolipin IgM Antibody Latest Ref Range: <11.0 MPL 25.1 (H)     Results for BART SMITH (MRN [id number]) as of 12/8/2021 09:22   Ref. Range 3/20/2012 12:04   Anticardiolipin IgG Antibody Latest Ref Range: <23.0 GPL 3.4     Results for BART SMITH (MRN [id number]) as of 12/8/2021 09:22   Ref. Range 10/8/2020 10:11   TSH Latest Ref Range: 0.30 - 5.00 uIU/mL 1.14       Elaine Vásquez MD  [facility]    Identified Health Risks:  "

## 2021-12-09 ASSESSMENT — PATIENT HEALTH QUESTIONNAIRE - PHQ9: SUM OF ALL RESPONSES TO PHQ QUESTIONS 1-9: 0

## 2021-12-10 NOTE — RESULT ENCOUNTER NOTE
Rios Michael your thyroid function is normal, please continue with any thyroid medication you are taking, as this is the correct dosing. Your kidney function is normal. Your bad cholesterol the LDL is 71, which is excellent, please continue with your simvastatin 40 mg daily.  Your liver enzymes are normal.    Elaine Vásquez MD

## 2021-12-29 DIAGNOSIS — B00.9 HERPES SIMPLEX VIRUS (HSV) INFECTION: ICD-10-CM

## 2021-12-29 NOTE — TELEPHONE ENCOUNTER
Reason for Call:  Medication or medication refill:    Do you use a [facility]? No. Name of the pharmacy and phone number for the current request:  [facility] [address] [place] [address]  [phone]    Name of the medication requested: valACYclovir (VALTREX) 1000 MG tablet    Other request: None    Can we leave a detailed message on this number? YES    Phone number patient can be reached at: Home number on file [phone] (home)    Best Time: any    Call taken on 12/29/2021 at 11:21 AM by Jim Garcia

## 2021-12-30 RX ORDER — VALACYCLOVIR HYDROCHLORIDE 1 G/1
TABLET, FILM COATED ORAL
Qty: 14 TABLET | Refills: 1 | Status: SHIPPED | OUTPATIENT
Start: 2021-12-30 | End: 2022-12-05

## 2022-01-25 ENCOUNTER — E-VISIT (OUTPATIENT)
Dept: INTERNAL MEDICINE | Facility: CLINIC | Age: 77
End: 2022-01-25
Payer: COMMERCIAL

## 2022-01-25 DIAGNOSIS — R31.9 HEMATURIA, UNSPECIFIED TYPE: Primary | ICD-10-CM

## 2022-01-25 PROCEDURE — 99207 PR NON-BILLABLE SERV PER CHARTING: CPT | Performed by: INTERNAL MEDICINE

## 2022-01-26 NOTE — PATIENT INSTRUCTIONS
Thank you for choosing us for your care. I think an in-clinic visit would be best next steps based on your symptoms. Please schedule a clinic appointment; you won t be charged for this eVisit.      You can schedule an appointment right here in [facility], or call [phone]

## 2022-01-31 DIAGNOSIS — E78.00 HYPERCHOLESTEROLEMIA: ICD-10-CM

## 2022-01-31 NOTE — TELEPHONE ENCOUNTER
Refill Request  Medication name: Pending Prescriptions:                       Disp   Refills    simvastatin (ZOCOR) 40 MG tablet          90 tab*0            Sig: TAKE 1 TABLET BY MOUTH EVERYDAY AT BEDTIME    Who prescribed the medication: Brice  Last refill on medication: 11/04/21  Requested [facility]: [facility]  Last appointment with PCP: 12/08/21  Next appointment: Not due

## 2022-02-01 RX ORDER — SIMVASTATIN 40 MG
TABLET ORAL
Qty: 90 TABLET | Refills: 3 | Status: SHIPPED | OUTPATIENT
Start: 2022-02-01 | End: 2022-12-05

## 2022-02-23 ENCOUNTER — TRANSFERRED RECORDS (OUTPATIENT)
Dept: HEALTH INFORMATION MANAGEMENT | Facility: CLINIC | Age: 77
End: 2022-02-23
Payer: COMMERCIAL

## 2022-05-02 DIAGNOSIS — I25.10 CAD (CORONARY ARTERY DISEASE): ICD-10-CM

## 2022-05-02 DIAGNOSIS — I10 HYPERTENSION: ICD-10-CM

## 2022-05-02 NOTE — TELEPHONE ENCOUNTER
Refill Request  Medication name: Pending Prescriptions:                       Disp   Refills    metoprolol succinate ER (TOPROL-XL) 25 MG*90 tab*3          Who prescribed the medication: Fahad  Last refill on medication: 02/03/22  [facility]ed [facility]  Last appointment with PCP: 12/08/21  Next appointment: Patient due for appointment

## 2022-05-04 RX ORDER — METOPROLOL SUCCINATE 25 MG/1
TABLET, EXTENDED RELEASE ORAL
Qty: 90 TABLET | Refills: 3 | Status: SHIPPED | OUTPATIENT
Start: 2022-05-04 | End: 2022-06-09

## 2022-05-26 DIAGNOSIS — F32.A DEPRESSION: ICD-10-CM

## 2022-05-26 NOTE — TELEPHONE ENCOUNTER
Refill Request  Medication name: Pending Prescriptions:                       Disp   Refills    sertraline (ZOLOFT) 25 MG tablet          270 ta*3          Who prescribed the medication: Fahad  Last refill on medication: 02/24/22  Requested [facility]  Last appointment with PCP: 12/08/21  Next appointment: Not due

## 2022-05-27 RX ORDER — SERTRALINE HYDROCHLORIDE 25 MG/1
TABLET, FILM COATED ORAL
Qty: 270 TABLET | Refills: 3 | Status: SHIPPED | OUTPATIENT
Start: 2022-05-27 | End: 2022-12-05

## 2022-05-27 NOTE — TELEPHONE ENCOUNTER
"Routing refill request to provider for review/approval because:  Patient needs to be seen       Last Written Prescription Date:  5/21/21  Last Fill Quantity: 270,  # refills: 3   Last office visit provider:  12/8/21     Requested Prescriptions   Pending Prescriptions Disp Refills     sertraline (ZOLOFT) 25 MG tablet 270 tablet 3       SSRIs Protocol Failed - 5/26/2022 10:25 AM        Failed - Recent (6 mo) or future (30 days) visit within the authorizing provider's specialty     Patient had office visit in the last 6 months or has a visit in the next 30 days with authorizing provider or within the authorizing provider's specialty.  See \"Patient Info\" tab in inbasket, or \"Choose Columns\" in Meds & Orders section of the refill encounter.            Passed - PHQ-9 score less than 5 in past 6 months     Please review last PHQ-9 score.           Passed - Medication is active on med list        Passed - Patient is age 18 or older        Passed - No active pregnancy on record        Passed - No positive pregnancy test in last 12 months             Adriana Chowdhury, RN 05/27/22 12:19 PM  "

## 2022-05-30 ENCOUNTER — MYC MEDICAL ADVICE (OUTPATIENT)
Dept: CARDIOLOGY | Facility: CLINIC | Age: 77
End: 2022-05-30
Payer: COMMERCIAL

## 2022-06-09 ENCOUNTER — OFFICE VISIT (OUTPATIENT)
Dept: CARDIOLOGY | Facility: CLINIC | Age: 77
End: 2022-06-09
Attending: INTERNAL MEDICINE
Payer: COMMERCIAL

## 2022-06-09 VITALS
DIASTOLIC BLOOD PRESSURE: 56 MMHG | HEIGHT: 62 IN | SYSTOLIC BLOOD PRESSURE: 106 MMHG | HEART RATE: 75 BPM | WEIGHT: 136.9 LBS | OXYGEN SATURATION: 96 % | BODY MASS INDEX: 25.19 KG/M2 | RESPIRATION RATE: 14 BRPM

## 2022-06-09 DIAGNOSIS — R55 POSTURAL DIZZINESS WITH PRESYNCOPE: Primary | ICD-10-CM

## 2022-06-09 DIAGNOSIS — R42 POSTURAL DIZZINESS WITH PRESYNCOPE: Primary | ICD-10-CM

## 2022-06-09 DIAGNOSIS — R07.89 OTHER CHEST PAIN: ICD-10-CM

## 2022-06-09 PROCEDURE — 99214 OFFICE O/P EST MOD 30 MIN: CPT | Performed by: INTERNAL MEDICINE

## 2022-06-09 NOTE — LETTER
"6/9/2022    Jerson Arreola MD  [address] [address]    RE: Rios Michael       Dear Colleague,     I had the pleasure of seeing Rios Michael in the [facility].    HEART CARE ENCOUNTER CONSULTATON NOTE      [facility]  [phone]      Assessment/Recommendations   Assessment:  1.  Coronary artery disease: Status post PCI of RCA in 2012.   No anginal complaints and active for her age  2.    Dyslipidemia: Lipids well controlled continue on current dose of Zocor.  3.    Hypertension: Blood pressure remains well controlled.    Plan:  1.  Stop the low-dose beta-blocker given symptoms of lightheadedness and blood pressure is very well controlled in fact in the low side today.  Check echocardiogram to evaluate for structural heart disease.  2.  Continue on aspirin, statin therapy  Follow-up in 1 year       History of Present Illness/Subjective    HPI: Rios Michael is a 76 year old female with history of coronary artery disease status post PCI of her RCA in February 2012 with preserved left ventricular systolic function, hypertension and dyslipidemia who I am seeing today in follow up.   She has had a few episodes of lightheadedness.  She is at a soccer game watching her 11-year-old grandson when she felt a little lightheaded when she was walking back to the car.  She had another episode again at a soccer game.  No syncopal episodes.  A third episode occurred when she was gardening.  She is active.  She has no trouble with activity.  She can golf without problems with chest pain or breathing difficulty.         Physical Examination  Review of Systems   Vitals: /56 (BP Location: Left arm, Patient Position: Sitting, Cuff Size: Adult Regular)   Pulse 75   Resp 14   Ht 1.562 m (5' 1.5\")   Wt 62.1 kg (136 lb 14.4 oz)   SpO2 96%   BMI 25.45 kg/m    BMI= Body mass index is 25.45 kg/m .  Wt Readings from Last 3 Encounters:   06/09/22 62.1 kg (136 lb 14.4 " oz)   12/08/21 61.4 kg (135 lb 6.4 oz)   08/23/21 63.7 kg (140 lb 8 oz)       General Appearance:   no distress, normal body habitus   ENT/Mouth: membranes moist, no oral lesions or bleeding gums.      EYES:  no scleral icterus, normal conjunctivae   Neck: no carotid bruits or thyromegaly   Chest/Lungs:   lungs are clear to auscultation   Cardiovascular:   Regular. Normal first and second heart sounds with no murmurs no edema bilaterally    Abdomen:  no organomegaly, masses, bruits, or tenderness; bowel sounds are present   Extremities: no cyanosis or clubbing   Skin: no xanthelasma, warm.    Neurologic: normal  bilateral, no tremors     Psychiatric: alert and oriented x3, calm     Cardiac ROS  Eyes: Negative  Ears/Nose/Throat: Negative  Respiratory: Negative  Gastrointestinal: Negative  Genitourinary: Negative  Musculoskeletal: Negative  Neurologic: Negative  Psychiatric: Negative  Hematologic/Lymphatic/Immunologic: Negative  Endocrine: Negative  Please refer above for cardiac ROS details.        Medical History  Surgical History Family History Social History   Past Medical History:   Diagnosis Date     Arthritis      Breast cyst [id number]     Coronary artery disease      Depression      Disease of thyroid gland      Family history of clotting disorder      Herpes simplex virus (HSV) infection     Created by Conversion  Replacement Utility updated for latest IMO load     High cholesterol      Hypertension      Status post hip replacement, right 11/12/2019     Past Surgical History:   Procedure Laterality Date     BREAST CYST EXCISION  [id number]    Removed from [facility]     CARDIAC CATHETERIZATION       CORONARY STENT PLACEMENT       AR PUNC/ASPIR BREAST CYST      Description: Breast Surgery Puncture Aspiration Of Cyst;  Recorded: 07/29/2008;     ZC TOTAL HIP ARTHROPLASTY Right 11/12/2019    Procedure: RIGHT DIRECT ANTERIOR TOTAL HIP ARTHROPLASTY;  Surgeon: Collin Perez MD;  Location: [facility]  OR;  Service: Orthopedics     [facility] CORONARY STENT PERCUT, INITIAL VESSEL      Description: Cath Stent Placement;  Recorded: 06/11/2012;  Comments: DARIANA to mid RCA     Family History   Problem Relation Age of Onset     Osteoporosis Mother      Dementia Mother      Coronary Artery Disease Father      Alzheimer Disease Father      Clotting Disorder Brother      Diabetes No family hx of         Social History     Socioeconomic History     Marital status: Single     Spouse name: Not on file     Number of children: 4     Years of education: Not on file     Highest education level: Not on file   Occupational History     Not on file   Tobacco Use     Smoking status: Never Smoker     Smokeless tobacco: Never Used   Substance and Sexual Activity     Alcohol use: Yes     Alcohol/week: 5.0 standard drinks     Drug use: No     Sexual activity: Not on file   Other Topics Concern     Not on file   Social History Narrative    She has 4 children, one in [place], others in the [place].  She has 8 grandchildren.      Social Determinants of Health     Financial Resource Strain: Not on file   Food Insecurity: Not on file   Transportation Needs: Not on file   Physical Activity: Not on file   Stress: Not on file   Social Connections: Not on file   Intimate Partner Violence: Not on file   Housing Stability: Not on file           Medications  Allergies   Current Outpatient Medications   Medication Sig Dispense Refill     aspirin 81 MG EC tablet [ASPIRIN 81 MG EC TABLET] Take 1 tablet (81 mg total) by mouth daily.  0     calcium citrate-vitamin D3 (CITRACAL + D) 315-250 mg-unit per tablet [CALCIUM CITRATE-VITAMIN D3 (CITRACAL + D) 315-250 MG-UNIT PER TABLET] Take 1 tablet by mouth daily.       cholecalciferol, vitamin D3, (VITAMIN D3) 1,000 unit capsule [CHOLECALCIFEROL, VITAMIN D3, (VITAMIN D3) 1,000 UNIT CAPSULE] Take 1,000 Units by mouth daily.              levothyroxine (SYNTHROID/LEVOTHROID) 75 MCG tablet Take 1 tablet (75 mcg) by  mouth daily 90 tablet 3     multivitamin therapeutic (THERAGRAN) tablet [MULTIVITAMIN THERAPEUTIC (THERAGRAN) TABLET] Take 1 tablet by mouth daily. 90 tablet 1     sertraline (ZOLOFT) 25 MG tablet [SERTRALINE (ZOLOFT) 25 MG TABLET] TAKE 3 TABLETS BY MOUTH EVERY  tablet 3     simvastatin (ZOCOR) 40 MG tablet TAKE 1 TABLET BY MOUTH EVERYDAY AT BEDTIME 90 tablet 3     valACYclovir (VALTREX) 1000 mg tablet [VALACYCLOVIR (VALTREX) 1000 MG TABLET] TAKE 1 TABLET BY MOUTH TWICE A DAY FOR FLARE UPS 14 tablet 1     zinc gluconate 50 mg tablet [ZINC GLUCONATE 50 MG TABLET] Take 50 mg by mouth daily.         Allergies   Allergen Reactions     Estradiol Hives     Evista [Raloxifene] Hives          Lab Results    Chemistry/lipid CBC Cardiac Enzymes/BNP/TSH/INR   Recent Labs   Lab Test 12/08/21  1016   CHOL 146   HDL 62   LDL 71   TRIG 65     Recent Labs   Lab Test 12/08/21  1016 10/08/20  1011 06/25/19  0728   LDL 71 61 82     Recent Labs   Lab Test 12/08/21  1016      POTASSIUM 4.5   CHLORIDE 106   CO2 28      BUN 17   CR 0.85   GFRESTIMATED 68   ALEX 9.9     Recent Labs   Lab Test 12/08/21  1016 10/08/20  1011 11/13/19  0716   CR 0.85 0.87 0.74     No results for input(s): A1C in the last 17583 hours.       Recent Labs   Lab Test 12/08/21  1016   WBC 4.3   HGB 13.3   HCT 40.3   MCV 99        Recent Labs   Lab Test 12/08/21  1016 10/08/20  1011 11/13/19  0552   HGB 13.3 14.4 9.1*    No results for input(s): TROPONINI in the last 30671 hours.  No results for input(s): BNP, NTBNPI, NTBNP in the last 19934 hours.  Recent Labs   Lab Test 12/08/21  1016   TSH 1.19     Recent Labs   Lab Test 11/12/19  0745   INR 1.10        Itzel Hanson MD    Thank you for allowing me to participate in the care of your patient.      Sincerely,     Itzel Hanson MD     [facility]  cc:   Itzel Hanson MD  [address] [facility]  [address]  [place]  [address]

## 2022-06-14 NOTE — PROGRESS NOTES
"  HEART CARE ENCOUNTER CONSULTATON NOTE      [facility]  [phone]      Assessment/Recommendations   Assessment:  1.  Coronary artery disease: Status post PCI of RCA in 2012.   No anginal complaints and active for her age  2.    Dyslipidemia: Lipids well controlled continue on current dose of Zocor.  3.    Hypertension: Blood pressure remains well controlled.    Plan:  1.  Stop the low-dose beta-blocker given symptoms of lightheadedness and blood pressure is very well controlled in fact in the low side today.  Check echocardiogram to evaluate for structural heart disease.  2.  Continue on aspirin, statin therapy  Follow-up in 1 year       History of Present Illness/Subjective    HPI: Rios Michael is a 76 year old female with history of coronary artery disease status post PCI of her RCA in February 2012 with preserved left ventricular systolic function, hypertension and dyslipidemia who I am seeing today in follow up.   She has had a few episodes of lightheadedness.  She is at a soccer game watching her 11-year-old grandson when she felt a little lightheaded when she was walking back to the car.  She had another episode again at a soccer game.  No syncopal episodes.  A third episode occurred when she was gardening.  She is active.  She has no trouble with activity.  She can golf without problems with chest pain or breathing difficulty.         Physical Examination  Review of Systems   Vitals: /56 (BP Location: Left arm, Patient Position: Sitting, Cuff Size: Adult Regular)   Pulse 75   Resp 14   Ht 1.562 m (5' 1.5\")   Wt 62.1 kg (136 lb 14.4 oz)   SpO2 96%   BMI 25.45 kg/m    BMI= Body mass index is 25.45 kg/m .  Wt Readings from Last 3 Encounters:   06/09/22 62.1 kg (136 lb 14.4 oz)   12/08/21 61.4 kg (135 lb 6.4 oz)   08/23/21 63.7 kg (140 lb 8 oz)       General Appearance:   no distress, normal body habitus   ENT/Mouth: membranes moist, no oral lesions or bleeding gums.    "   EYES:  no scleral icterus, normal conjunctivae   Neck: no carotid bruits or thyromegaly   Chest/Lungs:   lungs are clear to auscultation   Cardiovascular:   Regular. Normal first and second heart sounds with no murmurs no edema bilaterally    Abdomen:  no organomegaly, masses, bruits, or tenderness; bowel sounds are present   Extremities: no cyanosis or clubbing   Skin: no xanthelasma, warm.    Neurologic: normal  bilateral, no tremors     Psychiatric: alert and oriented x3, calm     Cardiac ROS  Eyes: Negative  Ears/Nose/Throat: Negative  Respiratory: Negative  Gastrointestinal: Negative  Genitourinary: Negative  Musculoskeletal: Negative  Neurologic: Negative  Psychiatric: Negative  Hematologic/Lymphatic/Immunologic: Negative  Endocrine: Negative  Please refer above for cardiac ROS details.        Medical History  Surgical History Family History Social History   Past Medical History:   Diagnosis Date     Arthritis      Breast cyst [id number]     Coronary artery disease      Depression      Disease of thyroid gland      Family history of clotting disorder      Herpes simplex virus (HSV) infection     Created by Conversion  Replacement Utility updated for latest IMO load     High cholesterol      Hypertension      Status post hip replacement, right 11/12/2019     Past Surgical History:   Procedure Laterality Date     BREAST CYST EXCISION  [id number]    Removed from [facility]     CARDIAC CATHETERIZATION       CORONARY STENT PLACEMENT       CO PUNC/ASPIR BREAST CYST      Description: Breast Surgery Puncture Aspiration Of Cyst;  Recorded: 07/29/2008;     [facility] TOTAL HIP ARTHROPLASTY Right 11/12/2019    Procedure: RIGHT DIRECT ANTERIOR TOTAL HIP ARTHROPLASTY;  Surgeon: Collin Perez MD;  Location: [facility];  Service: Orthopedics     [facility] CORONARY STENT PERCUT, INITIAL VESSEL      Description: Cath Stent Placement;  Recorded: 06/11/2012;  Comments: ADRIANA to mid RCA     Family History   Problem Relation  Age of Onset     Osteoporosis Mother      Dementia Mother      Coronary Artery Disease Father      Alzheimer Disease Father      Clotting Disorder Brother      Diabetes No family hx of         Social History     Socioeconomic History     Marital status: Single     Spouse name: Not on file     Number of children: 4     Years of education: Not on file     Highest education level: Not on file   Occupational History     Not on file   Tobacco Use     Smoking status: Never Smoker     Smokeless tobacco: Never Used   Substance and Sexual Activity     Alcohol use: Yes     Alcohol/week: 5.0 standard drinks     Drug use: No     Sexual activity: Not on file   Other Topics Concern     Not on file   Social History Narrative    She has 4 children, one in [place], others in the [place].  She has 8 grandchildren.      Social Determinants of Health     Financial Resource Strain: Not on file   Food Insecurity: Not on file   Transportation Needs: Not on file   Physical Activity: Not on file   Stress: Not on file   Social Connections: Not on file   Intimate Partner Violence: Not on file   Housing Stability: Not on file           Medications  Allergies   Current Outpatient Medications   Medication Sig Dispense Refill     aspirin 81 MG EC tablet [ASPIRIN 81 MG EC TABLET] Take 1 tablet (81 mg total) by mouth daily.  0     calcium citrate-vitamin D3 (CITRACAL + D) 315-250 mg-unit per tablet [CALCIUM CITRATE-VITAMIN D3 (CITRACAL + D) 315-250 MG-UNIT PER TABLET] Take 1 tablet by mouth daily.       cholecalciferol, vitamin D3, (VITAMIN D3) 1,000 unit capsule [CHOLECALCIFEROL, VITAMIN D3, (VITAMIN D3) 1,000 UNIT CAPSULE] Take 1,000 Units by mouth daily.              levothyroxine (SYNTHROID/LEVOTHROID) 75 MCG tablet Take 1 tablet (75 mcg) by mouth daily 90 tablet 3     multivitamin therapeutic (THERAGRAN) tablet [MULTIVITAMIN THERAPEUTIC (THERAGRAN) TABLET] Take 1 tablet by mouth daily. 90 tablet 1     sertraline (ZOLOFT) 25 MG tablet  [SERTRALINE (ZOLOFT) 25 MG TABLET] TAKE 3 TABLETS BY MOUTH EVERY  tablet 3     simvastatin (ZOCOR) 40 MG tablet TAKE 1 TABLET BY MOUTH EVERYDAY AT BEDTIME 90 tablet 3     valACYclovir (VALTREX) 1000 mg tablet [VALACYCLOVIR (VALTREX) 1000 MG TABLET] TAKE 1 TABLET BY MOUTH TWICE A DAY FOR FLARE UPS 14 tablet 1     zinc gluconate 50 mg tablet [ZINC GLUCONATE 50 MG TABLET] Take 50 mg by mouth daily.         Allergies   Allergen Reactions     Estradiol Hives     Evista [Raloxifene] Hives          Lab Results    Chemistry/lipid CBC Cardiac Enzymes/BNP/TSH/INR   Recent Labs   Lab Test 12/08/21  1016   CHOL 146   HDL 62   LDL 71   TRIG 65     Recent Labs   Lab Test 12/08/21  1016 10/08/20  1011 06/25/19  0728   LDL 71 61 82     Recent Labs   Lab Test 12/08/21  1016      POTASSIUM 4.5   CHLORIDE 106   CO2 28      BUN 17   CR 0.85   GFRESTIMATED 68   ALEX 9.9     Recent Labs   Lab Test 12/08/21  1016 10/08/20  1011 11/13/19  0716   CR 0.85 0.87 0.74     No results for input(s): A1C in the last 76296 hours.       Recent Labs   Lab Test 12/08/21  1016   WBC 4.3   HGB 13.3   HCT 40.3   MCV 99        Recent Labs   Lab Test 12/08/21  1016 10/08/20  1011 11/13/19  0552   HGB 13.3 14.4 9.1*    No results for input(s): TROPONINI in the last 23788 hours.  No results for input(s): BNP, NTBNPI, NTBNP in the last 92318 hours.  Recent Labs   Lab Test 12/08/21  1016   TSH 1.19     Recent Labs   Lab Test 11/12/19  0745   INR 1.10        Itzel Hanson MD

## 2022-06-16 ENCOUNTER — HOSPITAL ENCOUNTER (OUTPATIENT)
Dept: CARDIOLOGY | Facility: CLINIC | Age: 77
Discharge: HOME | End: 2022-06-16
Attending: INTERNAL MEDICINE | Admitting: INTERNAL MEDICINE
Payer: COMMERCIAL

## 2022-06-16 DIAGNOSIS — R55 POSTURAL DIZZINESS WITH PRESYNCOPE: ICD-10-CM

## 2022-06-16 DIAGNOSIS — R42 POSTURAL DIZZINESS WITH PRESYNCOPE: ICD-10-CM

## 2022-06-16 LAB — LVEF ECHO: NORMAL

## 2022-06-16 PROCEDURE — 255N000002 HC RX 255 OP 636: Performed by: INTERNAL MEDICINE

## 2022-06-16 PROCEDURE — 93306 TTE W/DOPPLER COMPLETE: CPT | Mod: 26 | Performed by: INTERNAL MEDICINE

## 2022-06-16 RX ADMIN — PERFLUTREN 2 ML: 6.52 INJECTION, SUSPENSION INTRAVENOUS at 09:45

## 2022-07-07 ENCOUNTER — TRANSFERRED RECORDS (OUTPATIENT)
Dept: HEALTH INFORMATION MANAGEMENT | Facility: CLINIC | Age: 77
End: 2022-07-07

## 2022-09-06 DIAGNOSIS — E03.9 ACQUIRED HYPOTHYROIDISM: ICD-10-CM

## 2022-09-06 RX ORDER — LEVOTHYROXINE SODIUM 75 UG/1
75 TABLET ORAL DAILY
Qty: 90 TABLET | Refills: 0 | Status: SHIPPED | OUTPATIENT
Start: 2022-09-06 | End: 2022-12-05

## 2022-09-06 NOTE — TELEPHONE ENCOUNTER
"Last Written Prescription Date:  9/13/21  Last Fill Quantity: 90,  # refills: 3   Last office visit provider:  12/8/21     Requested Prescriptions   Pending Prescriptions Disp Refills     levothyroxine (SYNTHROID/LEVOTHROID) 75 MCG tablet 90 tablet 3     Sig: Take 1 tablet (75 mcg) by mouth daily       Thyroid Protocol Passed - 9/6/2022  9:53 AM        Passed - Patient is 12 years or older        Passed - Recent (12 mo) or future (30 days) visit within the authorizing provider's specialty     Patient has had an office visit with the authorizing provider or a provider within the authorizing providers department within the previous 12 mos or has a future within next 30 days. See \"Patient Info\" tab in inbasket, or \"Choose Columns\" in Meds & Orders section of the refill encounter.              Passed - Medication is active on med list        Passed - Normal TSH on file in past 12 months     Recent Labs   Lab Test 12/08/21  1016   TSH 1.19              Passed - No active pregnancy on record     If patient is pregnant or has had a positive pregnancy test, please check TSH.          Passed - No positive pregnancy test in past 12 months     If patient is pregnant or has had a positive pregnancy test, please check TSH.               Adriana Chowdhury RN 09/06/22 5:21 PM  "

## 2022-09-25 ENCOUNTER — HEALTH MAINTENANCE LETTER (OUTPATIENT)
Age: 77
End: 2022-09-25

## 2022-11-10 ENCOUNTER — HOSPITAL ENCOUNTER (OUTPATIENT)
Dept: MAMMOGRAPHY | Facility: CLINIC | Age: 77
Discharge: HOME | End: 2022-11-10
Admitting: INTERNAL MEDICINE
Payer: COMMERCIAL

## 2022-11-10 DIAGNOSIS — Z12.31 VISIT FOR SCREENING MAMMOGRAM: ICD-10-CM

## 2022-11-10 PROCEDURE — 77067 SCR MAMMO BI INCL CAD: CPT

## 2022-12-04 SDOH — ECONOMIC STABILITY: INCOME INSECURITY: IN THE LAST 12 MONTHS, WAS THERE A TIME WHEN YOU WERE NOT ABLE TO PAY THE MORTGAGE OR RENT ON TIME?: NO

## 2022-12-04 SDOH — ECONOMIC STABILITY: TRANSPORTATION INSECURITY
IN THE PAST 12 MONTHS, HAS THE LACK OF TRANSPORTATION KEPT YOU FROM MEDICAL APPOINTMENTS OR FROM GETTING MEDICATIONS?: NO

## 2022-12-04 SDOH — ECONOMIC STABILITY: FOOD INSECURITY: WITHIN THE PAST 12 MONTHS, THE FOOD YOU BOUGHT JUST DIDN'T LAST AND YOU DIDN'T HAVE MONEY TO GET MORE.: NEVER TRUE

## 2022-12-04 SDOH — ECONOMIC STABILITY: TRANSPORTATION INSECURITY
IN THE PAST 12 MONTHS, HAS LACK OF TRANSPORTATION KEPT YOU FROM MEETINGS, WORK, OR FROM GETTING THINGS NEEDED FOR DAILY LIVING?: NO

## 2022-12-04 SDOH — HEALTH STABILITY: PHYSICAL HEALTH: ON AVERAGE, HOW MANY DAYS PER WEEK DO YOU ENGAGE IN MODERATE TO STRENUOUS EXERCISE (LIKE A BRISK WALK)?: 2 DAYS

## 2022-12-04 SDOH — ECONOMIC STABILITY: INCOME INSECURITY: HOW HARD IS IT FOR YOU TO PAY FOR THE VERY BASICS LIKE FOOD, HOUSING, MEDICAL CARE, AND HEATING?: NOT HARD AT ALL

## 2022-12-04 SDOH — HEALTH STABILITY: PHYSICAL HEALTH: ON AVERAGE, HOW MANY MINUTES DO YOU ENGAGE IN EXERCISE AT THIS LEVEL?: 20 MIN

## 2022-12-04 SDOH — ECONOMIC STABILITY: FOOD INSECURITY: WITHIN THE PAST 12 MONTHS, YOU WORRIED THAT YOUR FOOD WOULD RUN OUT BEFORE YOU GOT MONEY TO BUY MORE.: NEVER TRUE

## 2022-12-04 ASSESSMENT — LIFESTYLE VARIABLES
AUDIT-C TOTAL SCORE: 3
HOW OFTEN DO YOU HAVE A DRINK CONTAINING ALCOHOL: 2-3 TIMES A WEEK
SKIP TO QUESTIONS 9-10: 1
HOW OFTEN DO YOU HAVE SIX OR MORE DRINKS ON ONE OCCASION: NEVER
HOW MANY STANDARD DRINKS CONTAINING ALCOHOL DO YOU HAVE ON A TYPICAL DAY: 1 OR 2

## 2022-12-04 ASSESSMENT — SOCIAL DETERMINANTS OF HEALTH (SDOH)
IN A TYPICAL WEEK, HOW MANY TIMES DO YOU TALK ON THE PHONE WITH FAMILY, FRIENDS, OR NEIGHBORS?: MORE THAN THREE TIMES A WEEK
HOW OFTEN DO YOU GET TOGETHER WITH FRIENDS OR RELATIVES?: THREE TIMES A WEEK
DO YOU BELONG TO ANY CLUBS OR ORGANIZATIONS SUCH AS CHURCH GROUPS UNIONS, FRATERNAL OR ATHLETIC GROUPS, OR SCHOOL GROUPS?: YES
HOW OFTEN DO YOU ATTEND CHURCH OR RELIGIOUS SERVICES?: MORE THAN 4 TIMES PER YEAR

## 2022-12-05 ENCOUNTER — OFFICE VISIT (OUTPATIENT)
Dept: PEDIATRICS | Facility: CLINIC | Age: 77
End: 2022-12-05
Payer: COMMERCIAL

## 2022-12-05 VITALS
WEIGHT: 131 LBS | HEART RATE: 73 BPM | BODY MASS INDEX: 24.11 KG/M2 | HEIGHT: 62 IN | SYSTOLIC BLOOD PRESSURE: 104 MMHG | RESPIRATION RATE: 16 BRPM | OXYGEN SATURATION: 96 % | DIASTOLIC BLOOD PRESSURE: 70 MMHG | TEMPERATURE: 97.9 F

## 2022-12-05 DIAGNOSIS — F41.9 ANXIETY: ICD-10-CM

## 2022-12-05 DIAGNOSIS — D32.0 BENIGN NEOPLASM OF CEREBRAL MENINGES (H): ICD-10-CM

## 2022-12-05 DIAGNOSIS — M94.9 DISORDER OF BONE AND CARTILAGE: ICD-10-CM

## 2022-12-05 DIAGNOSIS — E78.2 MIXED HYPERLIPIDEMIA: ICD-10-CM

## 2022-12-05 DIAGNOSIS — Z78.0 MENOPAUSE PRESENT: ICD-10-CM

## 2022-12-05 DIAGNOSIS — Z76.89 ENCOUNTER TO ESTABLISH CARE: Primary | ICD-10-CM

## 2022-12-05 DIAGNOSIS — M89.9 DISORDER OF BONE AND CARTILAGE: ICD-10-CM

## 2022-12-05 DIAGNOSIS — I25.10 ATHEROSCLEROSIS OF NATIVE CORONARY ARTERY OF NATIVE HEART WITHOUT ANGINA PECTORIS: ICD-10-CM

## 2022-12-05 DIAGNOSIS — Z86.19 HISTORY OF COLD SORES: ICD-10-CM

## 2022-12-05 DIAGNOSIS — E03.9 ACQUIRED HYPOTHYROIDISM: ICD-10-CM

## 2022-12-05 PROBLEM — Z82.49 FAMILY HISTORY OF BLOOD CLOTS: Status: ACTIVE | Noted: 2022-12-05

## 2022-12-05 PROCEDURE — 99214 OFFICE O/P EST MOD 30 MIN: CPT | Performed by: INTERNAL MEDICINE

## 2022-12-05 RX ORDER — LEVOTHYROXINE SODIUM 75 UG/1
75 TABLET ORAL DAILY
Qty: 90 TABLET | Refills: 3 | Status: SHIPPED | OUTPATIENT
Start: 2022-12-05 | End: 2023-12-03

## 2022-12-05 RX ORDER — SERTRALINE HYDROCHLORIDE 25 MG/1
TABLET, FILM COATED ORAL
Qty: 270 TABLET | Refills: 3 | Status: SHIPPED | OUTPATIENT
Start: 2022-12-05 | End: 2024-02-28

## 2022-12-05 RX ORDER — VALACYCLOVIR HYDROCHLORIDE 500 MG/1
500 TABLET, FILM COATED ORAL 2 TIMES DAILY
Qty: 24 TABLET | Refills: 3 | Status: SHIPPED | OUTPATIENT
Start: 2022-12-05 | End: 2023-06-16

## 2022-12-05 RX ORDER — SIMVASTATIN 40 MG
TABLET ORAL
Qty: 90 TABLET | Refills: 3 | Status: SHIPPED | OUTPATIENT
Start: 2022-12-05 | End: 2023-12-15

## 2022-12-05 ASSESSMENT — ANXIETY QUESTIONNAIRES
5. BEING SO RESTLESS THAT IT IS HARD TO SIT STILL: NOT AT ALL
2. NOT BEING ABLE TO STOP OR CONTROL WORRYING: NOT AT ALL
GAD7 TOTAL SCORE: 0
3. WORRYING TOO MUCH ABOUT DIFFERENT THINGS: NOT AT ALL
6. BECOMING EASILY ANNOYED OR IRRITABLE: NOT AT ALL
1. FEELING NERVOUS, ANXIOUS, OR ON EDGE: NOT AT ALL
IF YOU CHECKED OFF ANY PROBLEMS ON THIS QUESTIONNAIRE, HOW DIFFICULT HAVE THESE PROBLEMS MADE IT FOR YOU TO DO YOUR WORK, TAKE CARE OF THINGS AT HOME, OR GET ALONG WITH OTHER PEOPLE: NOT DIFFICULT AT ALL
GAD7 TOTAL SCORE: 0
7. FEELING AFRAID AS IF SOMETHING AWFUL MIGHT HAPPEN: NOT AT ALL

## 2022-12-05 ASSESSMENT — ENCOUNTER SYMPTOMS
PARESTHESIAS: 0
HEMATURIA: 0
SORE THROAT: 0
DIARRHEA: 0
FREQUENCY: 0
MYALGIAS: 0
NERVOUS/ANXIOUS: 0
COUGH: 0
DYSURIA: 0
HEARTBURN: 0
HEADACHES: 0
HEMATOCHEZIA: 0
FEVER: 0
WEAKNESS: 0
SHORTNESS OF BREATH: 0
NAUSEA: 0
JOINT SWELLING: 0
CONSTIPATION: 0
CHILLS: 0
PALPITATIONS: 0
BREAST MASS: 0
EYE PAIN: 0
ARTHRALGIAS: 0
ABDOMINAL PAIN: 0
DIZZINESS: 0

## 2022-12-05 ASSESSMENT — PATIENT HEALTH QUESTIONNAIRE - PHQ9
5. POOR APPETITE OR OVEREATING: NOT AT ALL
SUM OF ALL RESPONSES TO PHQ QUESTIONS 1-9: 0

## 2022-12-05 ASSESSMENT — ACTIVITIES OF DAILY LIVING (ADL): CURRENT_FUNCTION: NO ASSISTANCE NEEDED

## 2022-12-05 ASSESSMENT — PAIN SCALES - GENERAL: PAINLEVEL: NO PAIN (0)

## 2022-12-05 NOTE — PROGRESS NOTES
# Coronary artery disease: Status post PCI of RCA in 2012  - cardiology 6/2022, f/up 1 year  - nl echo 6/2022  - nl stress 8/2021  - asa, statin    # HLD  - zocor 40    # HTN  - stopped low-dose beta-blocker given symptoms of lightheadednes    # Thyroid  TSH   Date Value Ref Range Status   12/08/2021 1.19 0.30 - 5.00 uIU/mL Final   - synthroid 75 mcg    # Osteopenia  # Vit d def  Vitamin D Deficiency Screening Results:  No results found for: VITDT   - vitamin d 1000 international unit(s) daily    # Migraine    # Meningioma    # Raynaud;s disease    # Mental health  - sertraline 75 mg daily    # Chronic vulvitis  - [facility]  - biopsy negative for malignancy 4/2022  - cotton underwear, cotton pads, aquaphor, or replense to reduce irritation to the area    ***valtrex  *** zinc    # HCM  - aged out of paps  - last humble 9/2021  - colon 6/2021, repeat in 2 years

## 2022-12-05 NOTE — PROGRESS NOTES
Assessment & Plan     (Z76.89) Encounter to establish care  (primary encounter diagnosis)  - vaccines utd  - due for dexa  - colon utd    (I25.10) Atherosclerosis of native coronary artery of native heart without angina pectoris  Follows w/ cardiology annualy.   Recent stress and echo nl.  Plan: simvastatin (ZOCOR) 40 MG tablet, Comprehensive        metabolic panel (BMP + Alb, Alk Phos, ALT, AST,        Total. Bili, TP), Lipid panel reflex to direct         LDL Fasting    (E78.2) Mixed hyperlipidemia  Plan: simvastatin (ZOCOR) 40 MG tablet, Lipid panel         reflex to direct LDL Fasting    (E03.9) Acquired hypothyroidism  TSH   Date Value Ref Range Status   12/08/2021 1.19 0.30 - 5.00 uIU/mL Final     Plan: levothyroxine (SYNTHROID/LEVOTHROID) 75 MCG         tablet, TSH with free T4 reflex    (F41.9) Anxiety  KASH-7 SCORE 12/5/2022   Total Score 0     PHQ 3/9/2021 12/8/2021 12/5/2022   PHQ-9 Total Score 0 0 0   Q9: Thoughts of better off dead/self-harm past 2 weeks Not at all Not at all Not at all     Plan: sertraline (ZOLOFT) 25 MG tablet      (M89.9,  M94.9) Osteopenia  (Z78.0) Menopause present  Vitamin D Deficiency Screening Results:  No results found for: VITDT  Plan: DX Hip/Pelvis/Spine, Vitamin D Deficiency    (Z86.19) History of cold sores  Plan: valACYclovir (VALTREX) 500 MG tablet    (D32.0) Meningioma  Comment: Small in 2012 - saw NSG and told that she did not need monitoring.    --------------------------  PATIENT INSTRUCTIONS    Patient Instructions   Great to meet you!    Refilled your medications for the year.     We'll do fasting labs one week prior to our Wellness Video Visit (then we will have taken care of everything.      --------------------------                   Return in about 4 weeks (around 1/2/2023) for Wellness Visit, Virtual Visit - Video.    Feng Doherty MD  [facility] CARLITA Delgado is a 76 year old, presenting for the following health  "issues:  Recheck Medication      Healthy Habits:     In general, how would you rate your overall health?  Excellent    Frequency of exercise:  2-3 days/week    Duration of exercise:  15-30 minutes    Do you usually eat at least 4 servings of fruit and vegetables a day, include whole grains    & fiber and avoid regularly eating high fat or \"junk\" foods?  Yes    Taking medications regularly:  Yes    Medication side effects:  Not applicable    Ability to successfully perform activities of daily living:  No assistance needed    Home Safety:  Throw rugs in the hallway    Hearing Impairment:  No hearing concerns    In the past 6 months, have you been bothered by leaking of urine?  No    In general, how would you rate your overall mental or emotional health?  Excellent      PHQ-2 Total Score: 0     #   Patient is on zinc for geographic tongue.  She says her tongue is sensitive to movement.     # FH  - brother had history of clots  - would do shots  - didn't reestablish and then massive PE.  - She saw a Hematologist (Dr. Arceo in 3/2012) - unable to see the note but she was told she did not need any special precautions      # Anxiety  # OCD   Zoloft 75 mg daily  She also goes to therapy.  Went to therapy before, strong kyung.    # Coronary artery disease: Status post PCI of RCA in 2012  - cardiology 6/2022, f/up 1 year  - nl echo 6/2022  - nl stress 8/2021  - asa, statin    # HLD  - zocor 40    # HTN  - stopped low-dose beta-blocker given symptoms of lightheadednes    # Thyroid  TSH   Date Value Ref Range Status   12/08/2021 1.19 0.30 - 5.00 uIU/mL Final   - synthroid 75 mcg    # Osteopenia  # Vit d def  Vitamin D Deficiency Screening Results:  No results found for: VITDT   - vitamin d 1000 international unit(s) daily    # Meningioma  - keshia to the ED for chest pain, but then was worried about stroke  - saw NSG afterwards, told didn't need to come back, really small.    # Raynauds disease    # Mental health  - sertraline 75 " "mg daily    # Chronic vulvitis  - started when she had blood on her pad.  - [facility]  - biopsy negative for malignancy 4/2022  - cotton underwear, cotton pads, aquaphor, or replense to reduce irritation to the area    # Cold sores  - valtrex    # HCM  - aged out of paps  - last humble 9/2021  - colon 6/2021, repeat in 2 years    Review of Systems   Constitutional, HEENT, cardiovascular, pulmonary, gi and gu systems are negative, except as otherwise noted.      Objective    /70 (Cuff Size: Adult Regular)   Pulse 73   Temp 97.9  F (36.6  C) (Tympanic)   Resp 16   Ht 1.562 m (5' 1.5\")   Wt 59.4 kg (131 lb)   SpO2 96%   BMI 24.35 kg/m    Body mass index is 24.35 kg/m .  Physical Exam   GENERAL: healthy, alert and no distress  EYES: Eyes grossly normal to inspection, PERRL and conjunctivae and sclerae normal  HENT: ear canals and TM's normal, nose and mouth without ulcers or lesions  NECK: no adenopathy, no asymmetry, masses, or scars and thyroid normal to palpation  RESP: lungs clear to auscultation - no rales, rhonchi or wheezes  CV: regular rate and rhythm, normal S1 S2, no S3 or S4, no murmur, click or rub, no peripheral edema and peripheral pulses strong  ABDOMEN: soft, nontender, no hepatosplenomegaly, no masses and bowel sounds normal  MS: no gross musculoskeletal defects noted, no edema  SKIN: no suspicious lesions or rashes  NEURO: Normal strength and tone, mentation intact and speech normal  PSYCH: mentation appears normal, affect normal/bright            "

## 2022-12-05 NOTE — PATIENT INSTRUCTIONS
Great to meet you!    Refilled your medications for the year.     We'll do fasting labs one week prior to our Wellness Video Visit (then we will have taken care of everything.

## 2022-12-30 ENCOUNTER — ANCILLARY PROCEDURE (OUTPATIENT)
Dept: BONE DENSITY | Facility: CLINIC | Age: 77
End: 2022-12-30
Attending: INTERNAL MEDICINE
Payer: COMMERCIAL

## 2022-12-30 DIAGNOSIS — M89.9 DISORDER OF BONE AND CARTILAGE: ICD-10-CM

## 2022-12-30 DIAGNOSIS — M94.9 DISORDER OF BONE AND CARTILAGE: ICD-10-CM

## 2022-12-30 DIAGNOSIS — Z78.0 MENOPAUSE PRESENT: ICD-10-CM

## 2022-12-30 PROCEDURE — 77080 DXA BONE DENSITY AXIAL: CPT | Mod: TC | Performed by: RADIOLOGY

## 2023-01-05 ENCOUNTER — LAB (OUTPATIENT)
Dept: LAB | Facility: CLINIC | Age: 78
End: 2023-01-05
Payer: COMMERCIAL

## 2023-01-05 DIAGNOSIS — E78.2 MIXED HYPERLIPIDEMIA: ICD-10-CM

## 2023-01-05 DIAGNOSIS — M89.9 DISORDER OF BONE AND CARTILAGE: ICD-10-CM

## 2023-01-05 DIAGNOSIS — I25.10 ATHEROSCLEROSIS OF NATIVE CORONARY ARTERY OF NATIVE HEART WITHOUT ANGINA PECTORIS: ICD-10-CM

## 2023-01-05 DIAGNOSIS — M94.9 DISORDER OF BONE AND CARTILAGE: ICD-10-CM

## 2023-01-05 DIAGNOSIS — E03.9 ACQUIRED HYPOTHYROIDISM: ICD-10-CM

## 2023-01-05 LAB
ALBUMIN SERPL BCG-MCNC: 3.9 G/DL (ref 3.5–5.2)
ALP SERPL-CCNC: 72 U/L (ref 35–104)
ALT SERPL W P-5'-P-CCNC: 18 U/L (ref 10–35)
ANION GAP SERPL CALCULATED.3IONS-SCNC: 12 MMOL/L (ref 7–15)
AST SERPL W P-5'-P-CCNC: 26 U/L (ref 10–35)
BILIRUB SERPL-MCNC: 0.6 MG/DL
BUN SERPL-MCNC: 17.9 MG/DL (ref 8–23)
CALCIUM SERPL-MCNC: 9.3 MG/DL (ref 8.8–10.2)
CHLORIDE SERPL-SCNC: 105 MMOL/L (ref 98–107)
CHOLEST SERPL-MCNC: 137 MG/DL
CREAT SERPL-MCNC: 0.87 MG/DL (ref 0.51–0.95)
DEPRECATED CALCIDIOL+CALCIFEROL SERPL-MC: 50 UG/L (ref 20–75)
DEPRECATED HCO3 PLAS-SCNC: 26 MMOL/L (ref 22–29)
GFR SERPL CREATININE-BSD FRML MDRD: 69 ML/MIN/1.73M2
GLUCOSE SERPL-MCNC: 110 MG/DL (ref 70–99)
HDLC SERPL-MCNC: 71 MG/DL
LDLC SERPL CALC-MCNC: 56 MG/DL
NONHDLC SERPL-MCNC: 66 MG/DL
POTASSIUM SERPL-SCNC: 4.4 MMOL/L (ref 3.4–5.3)
PROT SERPL-MCNC: 7.2 G/DL (ref 6.4–8.3)
SODIUM SERPL-SCNC: 143 MMOL/L (ref 136–145)
TRIGL SERPL-MCNC: 51 MG/DL
TSH SERPL DL<=0.005 MIU/L-ACNC: 1.35 UIU/ML (ref 0.3–4.2)

## 2023-01-05 PROCEDURE — 80053 COMPREHEN METABOLIC PANEL: CPT

## 2023-01-05 PROCEDURE — 82306 VITAMIN D 25 HYDROXY: CPT

## 2023-01-05 PROCEDURE — 36415 COLL VENOUS BLD VENIPUNCTURE: CPT

## 2023-01-05 PROCEDURE — 80061 LIPID PANEL: CPT

## 2023-01-05 PROCEDURE — 84443 ASSAY THYROID STIM HORMONE: CPT

## 2023-01-11 ASSESSMENT — ENCOUNTER SYMPTOMS
DYSURIA: 0
ABDOMINAL PAIN: 0
CHILLS: 0
ARTHRALGIAS: 0
PALPITATIONS: 0
MYALGIAS: 0
EYE PAIN: 0
WEAKNESS: 0
DIARRHEA: 0
HEMATOCHEZIA: 0
FEVER: 0
HEARTBURN: 0
COUGH: 0
HEMATURIA: 0
SORE THROAT: 0
NERVOUS/ANXIOUS: 0
CONSTIPATION: 0
PARESTHESIAS: 0
NAUSEA: 0
JOINT SWELLING: 0
HEADACHES: 0
BREAST MASS: 0
DIZZINESS: 0
SHORTNESS OF BREATH: 0

## 2023-01-11 ASSESSMENT — ACTIVITIES OF DAILY LIVING (ADL): CURRENT_FUNCTION: NO ASSISTANCE NEEDED

## 2023-01-11 NOTE — PROGRESS NOTES
"SUBJECTIVE:   Danny is a 77 year old who presents for Preventive Visit.    Are you in the first 12 months of your Medicare coverage?  No    Healthy Habits:     In general, how would you rate your overall health?  Excellent    Frequency of exercise:  2-3 days/week    Duration of exercise:  15-30 minutes    Do you usually eat at least 4 servings of fruit and vegetables a day, include whole grains    & fiber and avoid regularly eating high fat or \"junk\" foods?  Yes    Taking medications regularly:  Yes    Medication side effects:  None    Ability to successfully perform activities of daily living:  No assistance needed    Home Safety:  No safety concerns identified    Hearing Impairment:  No hearing concerns    In the past 6 months, have you been bothered by leaking of urine?  No    In general, how would you rate your overall mental or emotional health?  Excellent      PHQ-2 Total Score: 0    Additional concerns today:  No    Have you ever done Advance Care Planning? (For example, a Health Directive, POLST, or a discussion with a medical provider or your loved ones about your wishes): No, advance care planning information given to patient to review.  Patient plans to discuss their wishes with loved ones or provider.       Fall risk  Unable to complete due to virtual visit; need for additional assessment in future face-to-face visit    Cognitive Screening   1) Repeat 3 items (Leader, Season, Table)    2) Clock draw: NORMAL  3) 3 item recall: Recalls 2 objects   Results: NORMAL clock, 1-2 items recalled: COGNITIVE IMPAIRMENT LESS LIKELY    Mini-CogTM Copyright S Stacey. Licensed by the author for use in [facility]; reprinted with permission (tank@.[facility]). All rights reserved.      Do you have sleep apnea, excessive snoring or daytime drowsiness?: no     # CAD s/p PCI of RCA in 2012  - asa 81  - statin    # HLD    # Meningioma    # Hypothyroid  TSH   Date Value Ref Range Status   01/05/2023 1.35 0.30 - 4.20 " uIU/mL Final   12/08/2021 1.19 0.30 - 5.00 uIU/mL Final   - synthroid     # Osteopenia  # Vitamin D def  - calcium/vitamin d  Vitamin D Deficiency Screening Results:  Lab Results   Component Value Date    VITDT 50 01/05/2023     # Anxiety  KASH-7 SCORE 12/5/2022   Total Score 0     - sertraline 75    # History of cold sores  - valtrex prn    Reviewed and updated as needed this visit by clinical staff                  Reviewed and updated as needed this visit by Provider                 Social History     Tobacco Use     Smoking status: Never     Smokeless tobacco: Never   Substance Use Topics     Alcohol use: Yes     Alcohol/week: 5.0 standard drinks       Alcohol Use 1/11/2023   Prescreen: >3 drinks/day or >7 drinks/week? No       Current providers sharing in care for this patient include:   Patient Care Team:  Feng Doherty MD as PCP - General (Internal Medicine - Pediatrics)  Itzel Hanson MD as Assigned Heart and Vascular Provider  Feng Doherty MD as Assigned PCP    The following health maintenance items are reviewed in Epic and correct as of today:  Health Maintenance   Topic Date Due     MEDICARE ANNUAL WELLNESS VISIT  12/08/2022     ANNUAL REVIEW OF HM ORDERS  12/08/2022     PHQ-9  06/05/2023     COLORECTAL CANCER SCREENING  10/15/2023     FALL RISK ASSESSMENT  01/12/2024     DEXA  12/30/2024     ADVANCE CARE PLANNING  12/17/2026     LIPID  01/05/2028     DTAP/TDAP/TD IMMUNIZATION (3 - Td or Tdap) 12/08/2031     INFLUENZA VACCINE  Completed     Pneumococcal Vaccine: 65+ Years  Completed     ZOSTER IMMUNIZATION  Completed     COVID-19 Vaccine  Completed     IPV IMMUNIZATION  Aged Out     MENINGITIS IMMUNIZATION  Aged Out     HEPATITIS C SCREENING  Discontinued     MAMMO SCREENING  Discontinued       Mammogram Screening - Patient over age 75, has elected to continue with screening.  Pertinent mammograms are reviewed under the imaging tab.    Review of Systems   Constitutional:  "Negative for chills and fever.   HENT: Negative for congestion, ear pain, hearing loss and sore throat.    Eyes: Negative for pain and visual disturbance.   Respiratory: Negative for cough and shortness of breath.    Cardiovascular: Negative for chest pain, palpitations and peripheral edema.   Gastrointestinal: Negative for abdominal pain, constipation, diarrhea, heartburn, hematochezia and nausea.   Breasts:  Negative for tenderness, breast mass and discharge.   Genitourinary: Negative for dysuria, genital sores, hematuria, pelvic pain, urgency, vaginal bleeding and vaginal discharge.   Musculoskeletal: Negative for arthralgias, joint swelling and myalgias.   Skin: Negative for rash.   Neurological: Negative for dizziness, weakness, headaches and paresthesias.   Psychiatric/Behavioral: Negative for mood changes. The patient is not nervous/anxious.        OBJECTIVE:   There were no vitals taken for this visit. Estimated body mass index is 24.35 kg/m  as calculated from the following:    Height as of 12/5/22: 1.562 m (5' 1.5\").    Weight as of 12/5/22: 59.4 kg (131 lb).  Physical Exam  Well appearing  Breathing comfortably  Normal affect    Diagnostic Test Results:  Labs reviewed in Epic    ASSESSMENT / PLAN:       ICD-10-CM    1. Encounter for Medicare annual wellness exam  Z00.00       2. Atherosclerosis of native coronary artery of native heart without angina pectoris  I25.10       3. Mixed hyperlipidemia  E78.2       4. Hypothyroidism, unspecified type  E03.9       5. Anxiety  F41.9       6. Osteopenia - last dexa 12/23, qualified for treatment but holding next dexa in 12/2024  M89.9     M94.9       7. Vitamin D deficiency  E55.9       8. History of cold sores  Z86.19       9. Meningioma, small in 2012 - saw NSG and told that she did not need monitoring. (H)  D32.0       10. Hyperglycemia  R73.9 Hemoglobin A1c          (I25.10) Atherosclerosis of native coronary artery of native heart without angina " pectoris  Follows w/ cardiology annualy.   Recent stress and echo nl.  Plan: simvastatin (ZOCOR) 40 MG tablet, Comprehensive        metabolic panel (BMP + Alb, Alk Phos, ALT, AST,        Total. Bili, TP), Lipid panel reflex to direct         LDL Fasting     (E78.2) Mixed hyperlipidemia  Plan: simvastatin (ZOCOR) 40 MG tablet, Lipid panel         reflex to direct LDL Fasting     (E03.9) Acquired hypothyroidism  TSH   Date Value Ref Range Status   01/05/2023 1.35 0.30 - 4.20 uIU/mL Final   12/08/2021 1.19 0.30 - 5.00 uIU/mL Final     Plan: levothyroxine (SYNTHROID/LEVOTHROID) 75 MCG         tablet, TSH with free T4 reflex     (F41.9) Anxiety  KASH-7 SCORE 12/5/2022   Total Score 0      PHQ 3/9/2021 12/8/2021 12/5/2022   PHQ-9 Total Score 0 0 0   Q9: Thoughts of better off dead/self-harm past 2 weeks Not at all Not at all Not at all      Plan: sertraline (ZOLOFT) 25 MG tablet        (M89.9,  M94.9) Osteopenia  (Z78.0) Menopause present  Vitamin D Deficiency Screening Results:  Lab Results   Component Value Date    VITDT 50 01/05/2023     Plan: Meets criteria for treatment but declines today - can readdress in 2 years.      (Z86.19) History of cold sores  Plan: valACYclovir (VALTREX) 500 MG tablet     (D32.0) Meningioma  Comment: Small in 2012 - saw NSG and told that she did not need monitoring.      Patient has been advised of split billing requirements and indicates understanding: No   Recent med check.    COUNSELING:  Reviewed preventive health counseling, as reflected in patient instructions        She reports that she has never smoked. She has never used smokeless tobacco.      Appropriate preventive services were discussed with this patient, including applicable screening as appropriate for cardiovascular disease, diabetes, osteopenia/osteoporosis, and glaucoma.  As appropriate for age/gender, discussed screening for colorectal cancer, prostate cancer, breast cancer, and cervical cancer. Checklist reviewing  preventive services available has been given to the patient.    Reviewed patients plan of care and provided an AVS. The Intermediate Care Plan ( asthma action plan, low back pain action plan, and migraine action plan) for Rios meets the Care Plan requirement. This Care Plan has been established and reviewed with the Patient.    Feng Doherty MD  [facility]    Video-Visit Details    Type of service:  Video Visit   Video Start Time: 1003 until 1014, then phone 1015 to 1025  Video End Time:1025    Originating Location (pt. Location): Home    Distant Location (provider location):  Off-site  Platform used for Video Visit: EstevanWell

## 2023-01-12 ENCOUNTER — VIRTUAL VISIT (OUTPATIENT)
Dept: PEDIATRICS | Facility: CLINIC | Age: 78
End: 2023-01-12
Payer: COMMERCIAL

## 2023-01-12 DIAGNOSIS — I25.10 ATHEROSCLEROSIS OF NATIVE CORONARY ARTERY OF NATIVE HEART WITHOUT ANGINA PECTORIS: ICD-10-CM

## 2023-01-12 DIAGNOSIS — E55.9 VITAMIN D DEFICIENCY: ICD-10-CM

## 2023-01-12 DIAGNOSIS — R73.9 HYPERGLYCEMIA: ICD-10-CM

## 2023-01-12 DIAGNOSIS — Z00.00 ENCOUNTER FOR MEDICARE ANNUAL WELLNESS EXAM: Primary | ICD-10-CM

## 2023-01-12 DIAGNOSIS — E78.2 MIXED HYPERLIPIDEMIA: ICD-10-CM

## 2023-01-12 DIAGNOSIS — M94.9 DISORDER OF BONE AND CARTILAGE: ICD-10-CM

## 2023-01-12 DIAGNOSIS — Z86.19 HISTORY OF COLD SORES: ICD-10-CM

## 2023-01-12 DIAGNOSIS — M89.9 DISORDER OF BONE AND CARTILAGE: ICD-10-CM

## 2023-01-12 DIAGNOSIS — D32.0 BENIGN NEOPLASM OF CEREBRAL MENINGES (H): ICD-10-CM

## 2023-01-12 DIAGNOSIS — E03.9 HYPOTHYROIDISM, UNSPECIFIED TYPE: ICD-10-CM

## 2023-01-12 DIAGNOSIS — F41.9 ANXIETY: ICD-10-CM

## 2023-01-12 PROCEDURE — 99397 PER PM REEVAL EST PAT 65+ YR: CPT | Mod: 95 | Performed by: INTERNAL MEDICINE

## 2023-01-12 ASSESSMENT — ENCOUNTER SYMPTOMS
ABDOMINAL PAIN: 0
DIARRHEA: 0
SORE THROAT: 0
PARESTHESIAS: 0
HEADACHES: 0
DIZZINESS: 0
HEARTBURN: 0
SHORTNESS OF BREATH: 0
PALPITATIONS: 0
BREAST MASS: 0
HEMATURIA: 0
WEAKNESS: 0
NAUSEA: 0
EYE PAIN: 0
ARTHRALGIAS: 0
FEVER: 0
DYSURIA: 0
CONSTIPATION: 0
MYALGIAS: 0
NERVOUS/ANXIOUS: 0
CHILLS: 0
COUGH: 0
HEMATOCHEZIA: 0
JOINT SWELLING: 0

## 2023-01-12 ASSESSMENT — ACTIVITIES OF DAILY LIVING (ADL): CURRENT_FUNCTION: NO ASSISTANCE NEEDED

## 2023-04-18 ENCOUNTER — TRANSFERRED RECORDS (OUTPATIENT)
Dept: HEALTH INFORMATION MANAGEMENT | Facility: CLINIC | Age: 78
End: 2023-04-18
Payer: COMMERCIAL

## 2023-06-10 ENCOUNTER — APPOINTMENT (OUTPATIENT)
Dept: RADIOLOGY | Facility: CLINIC | Age: 78
End: 2023-06-10
Attending: EMERGENCY MEDICINE
Payer: COMMERCIAL

## 2023-06-10 ENCOUNTER — APPOINTMENT (OUTPATIENT)
Dept: CT IMAGING | Facility: CLINIC | Age: 78
End: 2023-06-10
Attending: STUDENT IN AN ORGANIZED HEALTH CARE EDUCATION/TRAINING PROGRAM
Payer: COMMERCIAL

## 2023-06-10 ENCOUNTER — HOSPITAL ENCOUNTER (OUTPATIENT)
Facility: CLINIC | Age: 78
Setting detail: OBSERVATION
Discharge: HOME | End: 2023-06-12
Attending: EMERGENCY MEDICINE | Admitting: HOSPITALIST
Payer: COMMERCIAL

## 2023-06-10 DIAGNOSIS — S32.82XA MULTIPLE CLOSED FRACTURES OF PELVIS WITHOUT DISRUPTION OF PELVIC RING, INITIAL ENCOUNTER (H): ICD-10-CM

## 2023-06-10 PROCEDURE — 99222 1ST HOSP IP/OBS MODERATE 55: CPT | Performed by: HOSPITALIST

## 2023-06-10 PROCEDURE — 99285 EMERGENCY DEPT VISIT HI MDM: CPT | Mod: 25

## 2023-06-10 PROCEDURE — 73552 X-RAY EXAM OF FEMUR 2/>: CPT | Mod: LT

## 2023-06-10 PROCEDURE — 120N000001 HC R&B MED SURG/OB

## 2023-06-10 PROCEDURE — 72100 X-RAY EXAM L-S SPINE 2/3 VWS: CPT

## 2023-06-10 PROCEDURE — 72170 X-RAY EXAM OF PELVIS: CPT

## 2023-06-10 PROCEDURE — 250N000013 HC RX MED GY IP 250 OP 250 PS 637: Performed by: EMERGENCY MEDICINE

## 2023-06-10 PROCEDURE — 71101 X-RAY EXAM UNILAT RIBS/CHEST: CPT | Mod: LT

## 2023-06-10 PROCEDURE — 250N000013 HC RX MED GY IP 250 OP 250 PS 637: Performed by: HOSPITALIST

## 2023-06-10 PROCEDURE — 72192 CT PELVIS W/O DYE: CPT

## 2023-06-10 RX ORDER — OXYCODONE HYDROCHLORIDE 5 MG/1
5 TABLET ORAL EVERY 4 HOURS PRN
Status: DISCONTINUED | OUTPATIENT
Start: 2023-06-10 | End: 2023-06-12 | Stop reason: HOSPADM

## 2023-06-10 RX ORDER — LIDOCAINE 40 MG/G
CREAM TOPICAL
Status: DISCONTINUED | OUTPATIENT
Start: 2023-06-10 | End: 2023-06-12 | Stop reason: HOSPADM

## 2023-06-10 RX ORDER — ASPIRIN 81 MG/1
81 TABLET ORAL DAILY
Status: DISCONTINUED | OUTPATIENT
Start: 2023-06-11 | End: 2023-06-12 | Stop reason: HOSPADM

## 2023-06-10 RX ORDER — HYDROCODONE BITARTRATE AND ACETAMINOPHEN 5; 325 MG/1; MG/1
1 TABLET ORAL ONCE
Status: COMPLETED | OUTPATIENT
Start: 2023-06-10 | End: 2023-06-10

## 2023-06-10 RX ORDER — LEVOTHYROXINE SODIUM 75 UG/1
75 TABLET ORAL
Status: DISCONTINUED | OUTPATIENT
Start: 2023-06-11 | End: 2023-06-12 | Stop reason: HOSPADM

## 2023-06-10 RX ORDER — IBUPROFEN 600 MG/1
600 TABLET, FILM COATED ORAL ONCE
Status: COMPLETED | OUTPATIENT
Start: 2023-06-10 | End: 2023-06-10

## 2023-06-10 RX ORDER — HYDROMORPHONE HYDROCHLORIDE 1 MG/ML
0.5 INJECTION, SOLUTION INTRAMUSCULAR; INTRAVENOUS; SUBCUTANEOUS EVERY 4 HOURS PRN
Status: DISCONTINUED | OUTPATIENT
Start: 2023-06-10 | End: 2023-06-12 | Stop reason: HOSPADM

## 2023-06-10 RX ORDER — SIMVASTATIN 40 MG
40 TABLET ORAL AT BEDTIME
Status: DISCONTINUED | OUTPATIENT
Start: 2023-06-10 | End: 2023-06-12 | Stop reason: HOSPADM

## 2023-06-10 RX ORDER — ACETAMINOPHEN 325 MG/1
975 TABLET ORAL EVERY 8 HOURS
Status: DISCONTINUED | OUTPATIENT
Start: 2023-06-10 | End: 2023-06-12 | Stop reason: HOSPADM

## 2023-06-10 RX ADMIN — IBUPROFEN 600 MG: 600 TABLET ORAL at 15:22

## 2023-06-10 RX ADMIN — SIMVASTATIN 40 MG: 20 TABLET, FILM COATED ORAL at 22:11

## 2023-06-10 RX ADMIN — HYDROCODONE BITARTRATE AND ACETAMINOPHEN 1 TABLET: 5; 325 TABLET ORAL at 19:41

## 2023-06-10 ASSESSMENT — ACTIVITIES OF DAILY LIVING (ADL)
ADLS_ACUITY_SCORE: 35

## 2023-06-10 NOTE — ED PROVIDER NOTES
EMERGENCY DEPARTMENT ENCOUNTER      NAME: Rios Michael  AGE: 77 year old female  YOB: 1945  MRN: [id number]  EVALUATION DATE & TIME: 6/10/2023  2:19 PM    PCP: Feng Doherty    ED PROVIDER: Christa Beth MD      Chief Complaint   Patient presents with     Fall     Hip Pain         FINAL IMPRESSION:  1. Multiple closed fractures of pelvis without disruption of pelvic ring, initial encounter (H)          ED COURSE & MEDICAL DECISION MAKING:    Pertinent Labs & Imaging studies reviewed. (See chart for details)    2:44 PM I introduced myself to the patient, obtained patient history, performed a physical exam, and discussed plan for ED workup including potential diagnostic laboratory/imaging studies and interventions.  7:29 PM Spoke with the Hospitalist, Dr. Kim who accepts the patient for admission.       77 year old female with history of osteoporosis who presents to the Emergency Department for evaluation of fall.  Patient had a mechanical fall onto her left hip now with pain in the left hip area and into the groin.  No sign of open fracture.  Compartments are soft compressible and she is neurovascularly intact in the left lower extremity.  Also has some left-sided lower back discomfort but no midline tenderness.  Also has some mild left chest wall discomfort on palpation without significant pain otherwise.  No focal neurologic deficits and did not hit her head or lose consciousness.  She is not on any anticoagulation other than aspirin.  She was given oral ibuprofen and Norco for pain control.  X-rays of the left femur, pelvis, lumbar spine as well as x-ray of the left ribs and chest were obtained.    X-rays of the pelvis and femur reveal a nondisplaced left parasymphyseal fracture fever acute and subtle linear sclerotic line in the inferior pubic ramus on the left equivocal for nondisplaced fracture possibly nondisplaced fracture.  Moderate degenerative changes in the left hip.   No dislocation.  No fracture of the more distal femur.  X-ray of the chest and left ribs without acute fracture or other pathology.  X-ray of the lumbar spine Reveals diffuse osseous demineralization.  Severe disc base narrowing at L2-L3 which was previously there.  No obvious acute fractures.  Did attempt to have the patient ambulate to evaluate whether she needs to be admitted for pain control and she had significant amount of pain trying to get out of the bed so was unable to do so.  She lives alone and her daughter as well as the patient is in agreement with staying in the hospital for further management.  Spoke with the hospitalist who excepted the patient for admission.  She was admitted in stable condition.       At the conclusion of the encounter I discussed the results of all of the tests and the disposition. The questions were answered. The patient or family acknowledged understanding and was agreeable with the care plan.         Medical Decision Making    History:    Supplemental history from: Documented in chart, if applicable    External Record(s) reviewed: Documented in chart, if applicable.    Work Up:    Chart documentation includes differential considered and any EKGs or imaging independently interpreted by provider.    In additional to work up documented, I considered the following work up: Documented in chart, if applicable.    External consultation:    Discussion of management with another provider: Documented in chart, if applicable    Complicating factors:    Care impacted by chronic illness: Chronic Pain, Heart Disease, Hyperlipidemia, Hypertension, Mental Health and Other: Hypothyroidism, arthritis, osteopenia    Care affected by social determinants of health: N/A    Disposition considerations: Admit.      MEDICATIONS GIVEN IN THE EMERGENCY:  Medications   simvastatin (ZOCOR) tablet 40 mg (has no administration in time range)   sertraline (ZOLOFT) tablet 75 mg (has no administration in time  range)   levothyroxine (SYNTHROID/LEVOTHROID) tablet 75 mcg (has no administration in time range)   aspirin EC tablet 81 mg (has no administration in time range)   lidocaine 1 % 0.1-1 mL (has no administration in time range)   lidocaine (LMX4) cream (has no administration in time range)   sodium chloride (PF) 0.9% PF flush 3 mL (has no administration in time range)   sodium chloride (PF) 0.9% PF flush 3 mL (has no administration in time range)   melatonin tablet 1 mg (has no administration in time range)   ibuprofen (ADVIL/MOTRIN) tablet 600 mg (600 mg Oral $Given 6/10/23 1522)   HYDROcodone-acetaminophen (NORCO) 5-325 MG per tablet 1 tablet (1 tablet Oral $Given 6/10/23 1941)       NEW PRESCRIPTIONS STARTED AT TODAY'S ER VISIT  New Prescriptions    No medications on file          =================================================================    HPI    Patient information was obtained from: Patient     Use of : N/A         Rios Michael is a 77 year old female with a pertinent medical history of arthritis, osteopenia, HTN, HLD, hypothyroidism, CAD, anxiety, who presents to this ED for evaluation of a fall.     Patient reports that earlier today she was walking her arias retriever puppy when the dog suddenly took a left and tugged on the leash causing her to fall. She notes that she first landed on her left hip secondary to the fall before then falling onto her back. She endorses developing left chest wall pain, left hip pain, left buttock pain, left pelvic pain, and left groin pain secondary to the fall. She endorses all of her pains being provoked with bearing weight and movement, but she denies having any of her pains at rest. She notes she was unable to ambulate secondary to fall due to her pain. She denies any head injury or loss of consciousness secondary to the fall. She states that prior to the fall she was at baseline and in her normal state of health. She denies taking any medications for  her symptoms. She endorses a PMH of right hip replacement, heart disease, and hypothyroidism. She endorses regularly taking 81 mg Aspirin due to a history of a stent placement. No other anticoagulation. She denies any recent back pain, leg pain, knee pain, numbness, paraesthesias, weakness, abdominal pain, chest pain, shortness of breath, or any other complications at this time.      REVIEW OF SYSTEMS   Review of Systems   Pertinent positives and negatives are documented in the HPI. All other systems reviewed and are negative.      PAST MEDICAL HISTORY:  Past Medical History:   Diagnosis Date     Arthritis      Breast cyst [id number]     Coronary artery disease      Depression      Disease of thyroid gland      Family history of clotting disorder      Herpes simplex virus (HSV) infection     Created by Conversion  Replacement Utility updated for latest IMO load     High cholesterol      Hypertension      Status post hip replacement, right 11/12/2019       PAST SURGICAL HISTORY:  Past Surgical History:   Procedure Laterality Date     BREAST CYST EXCISION  [id number]    Removed from [facility]     CARDIAC CATHETERIZATION       CORONARY STENT PLACEMENT       AK PUNC/ASPIR BREAST CYST      Description: Breast Surgery Puncture Aspiration Of Cyst;  Recorded: 07/29/2008;     [facility] TOTAL HIP ARTHROPLASTY Right 11/12/2019    Procedure: RIGHT DIRECT ANTERIOR TOTAL HIP ARTHROPLASTY;  Surgeon: Collin Perez MD;  Location: [facility];  Service: Orthopedics     [facility] CORONARY STENT PERCUT, INITIAL VESSEL      Description: Cath Stent Placement;  Recorded: 06/11/2012;  Comments: ADRIANA to mid RCA           CURRENT MEDICATIONS:    aspirin 81 MG EC tablet  calcium citrate-vitamin D3 (CITRACAL + D) 315-250 mg-unit per tablet  cholecalciferol, vitamin D3, (VITAMIN D3) 1,000 unit capsule  hypromellose (ARTIFICIAL TEARS) 0.5 % SOLN ophthalmic solution  levothyroxine (SYNTHROID/LEVOTHROID) 75 MCG tablet  multivitamin therapeutic  (THERAGRAN) tablet  sertraline (ZOLOFT) 25 MG tablet  simvastatin (ZOCOR) 40 MG tablet  valACYclovir (VALTREX) 500 MG tablet        ALLERGIES:  Allergies   Allergen Reactions     Estradiol Hives     Evista [Raloxifene] Hives       FAMILY HISTORY:  Family History   Problem Relation Age of Onset     Osteoporosis Mother      Dementia Mother      Coronary Artery Disease Father         in 80s     Alzheimer Disease Father      Clotting Disorder Brother         PE at age 64     Other - See Comments Brother         Dementia? Heart issues     Coronary Artery Disease Brother         bypass     Diabetes Type 2  Brother      Pulmonary Embolism Son      Diabetes No family hx of        SOCIAL HISTORY:   Social History     Socioeconomic History     Marital status: Single     Number of children: 4   Tobacco Use     Smoking status: Never     Smokeless tobacco: Never   Substance and Sexual Activity     Alcohol use: Yes     Alcohol/week: 5.0 standard drinks of alcohol     Drug use: No   Social History Narrative    12/2002    She has 4 children, one in [place], others in the [place].  She has 8 grandchildren (ages 14 to 11)        .         Bible study, high school friends have reconnected.     Grew up in [place].      Social Determinants of Health     Financial Resource Strain: Low Risk  (12/4/2022)    Overall Financial Resource Strain (CARDIA)      Difficulty of Paying Living Expenses: Not hard at all   Food Insecurity: No Food Insecurity (12/4/2022)    Hunger Vital Sign      Worried About Running Out of Food in the Last Year: Never true      Ran Out of Food in the Last Year: Never true   Transportation Needs: No Transportation Needs (12/4/2022)    PRAPARE - Transportation      Lack of Transportation (Medical): No      Lack of Transportation (Non-Medical): No   Physical Activity: Insufficiently Active (12/4/2022)    Exercise Vital Sign      Days of Exercise per Week: 2 days      Minutes of Exercise per Session: 20  "min   Stress: No Stress Concern Present (12/4/2022)    [employer] [place] [employer] - Occupational Stress Questionnaire      Feeling of Stress : Not at all   Social Connections: Moderately Integrated (12/4/2022)    Social Connection and Isolation Panel [NHANES]      Frequency of Communication with Friends and Family: More than three times a week      Frequency of Social Gatherings with Friends and Family: Three times a week      Attends [employer] Services: More than 4 times per year      Active Member of Clubs or Organizations: Yes      Marital Status:    Housing Stability: Unknown (12/4/2022)    Housing Stability Vital Sign      Unable to Pay for Housing in the Last Year: No      Number of Places Lived in the Last Year: 1       VITALS:  /63   Pulse 83   Temp 98.1  F (36.7  C) (Oral)   Resp 16   Ht 1.562 m (5' 1.5\")   Wt 62.6 kg (138 lb)   SpO2 93%   BMI 25.65 kg/m      PHYSICAL EXAM    Physical Exam  Constitutional: Well developed, Well nourished, NAD, GCS 15  HENT: Normocephalic, Atraumatic, Bilateral external ears normal, Oropharynx normal, mucous membranes moist, Nose normal. Neck- Normal range of motion, No midline cervical spine tenderness, Supple, No stridor.   Eyes: PERRL, EOMI, Conjunctiva normal, No discharge.   Respiratory: Normal breath sounds, No respiratory distress, No wheezing or crackles, Speaks in full sentences easily.   Cardiovascular: Normal heart rate, Regular rhythm, No murmurs, No rubs, No gallops. 2+ radial pulses bilaterally. Mild tenderness along the lower left chest wall.    GI: Bowel sounds normal, Soft, No tenderness, No masses, No rebound or guarding.   Musculoskeletal: 2+ DP and PT pulses bilaterally. No notable lower extremity edema. No obvious deformities noted. Tenderness in the left hip and into the inguinal area. Pelvis is stable to compression. Pain with range of motion of the left hip. Left sided paraspinous muscle tenderness in the lumbar area, " no midline tenderness of the CTLS spine. Compartments are soft and compressible. No other extremity tenderness and otherwise normal range of motion of the extremities.   Integument: Warm, Dry, No erythema, No rash. No petechiae. No lacerations.   Neurologic: Alert & oriented x 3, 5/5 strength in all 4 extremities bilaterally. Sensation intact to light touch in all 4 extremities and the face bilaterally. No focal deficits noted.   Psychiatric: Affect normal, Judgment normal, Mood normal. Cooperative.      LAB:  All pertinent labs reviewed and interpreted.  Results for orders placed or performed during the hospital encounter of 06/10/23   XR Pelvis 1/2 Views    Impression    IMPRESSION: Bones are demineralized. There is a right total hip replacement.  Nondisplaced left parasymphyseal fracture, favor acute. Subtle linear sclerotic line in the left inferior pubic ramus is equivocal for a nondisplaced fracture, possibly a   nondisplaced fracture. CT of the pelvis could assess further.    Moderate degenerative changes left hip. No dislocation. There is no fracture of the more distal femur.    NOTE: ABNORMAL REPORT    THE DICTATION ABOVE DESCRIBES AN ABNORMALITY FOR WHICH FOLLOW-UP IS NEEDED.        XR Femur Left 2 Views    Impression    IMPRESSION: Bones are demineralized. There is a right total hip replacement.  Nondisplaced left parasymphyseal fracture, favor acute. Subtle linear sclerotic line in the left inferior pubic ramus is equivocal for a nondisplaced fracture, possibly a   nondisplaced fracture. CT of the pelvis could assess further.    Moderate degenerative changes left hip. No dislocation. There is no fracture of the more distal femur.    NOTE: ABNORMAL REPORT    THE DICTATION ABOVE DESCRIBES AN ABNORMALITY FOR WHICH FOLLOW-UP IS NEEDED.        XR Lumbar Spine 2/3 Views    Impression    IMPRESSION: There is diffuse osseous demineralization that limits evaluation for fracture. Nomenclature is based on 5 lumbar  vertebral bodies. Mild presumed degenerative endplate remodeling of the apposing endplates at the L2-L3 level, as before. No   other gross lumbar vertebral body height loss is identified. There is again mild dextroconvex curvature with apex at L2. Alignment otherwise appears within normal limits. There is severe disc space narrowing at L2-L3, as before. Relatively lesser degrees   of minimal/mild disc space narrowing elsewhere. Multilevel degenerative facet disease. Calcified phlebolith projecting over the left pelvis, as before. Partially visualized right hip prosthesis. A few amorphous calcifications in the left upper quadrant   of the abdomen are noted, nonspecific, possibly vascular calcifications.   Ribs XR, unilat 3 views + PA chest,  left    Impression    IMPRESSION: The visualized heart and lungs are negative. No rib fractures.       RADIOLOGY:  Reviewed all pertinent imaging. Please see official radiology report.  Ribs XR, unilat 3 views + PA chest,  left   Final Result   IMPRESSION: The visualized heart and lungs are negative. No rib fractures.      XR Lumbar Spine 2/3 Views   Final Result   IMPRESSION: There is diffuse osseous demineralization that limits evaluation for fracture. Nomenclature is based on 5 lumbar vertebral bodies. Mild presumed degenerative endplate remodeling of the apposing endplates at the L2-L3 level, as before. No    other gross lumbar vertebral body height loss is identified. There is again mild dextroconvex curvature with apex at L2. Alignment otherwise appears within normal limits. There is severe disc space narrowing at L2-L3, as before. Relatively lesser degrees    of minimal/mild disc space narrowing elsewhere. Multilevel degenerative facet disease. Calcified phlebolith projecting over the left pelvis, as before. Partially visualized right hip prosthesis. A few amorphous calcifications in the left upper quadrant    of the abdomen are noted, nonspecific, possibly vascular  calcifications.      XR Femur Left 2 Views   Final Result   IMPRESSION: Bones are demineralized. There is a right total hip replacement.  Nondisplaced left parasymphyseal fracture, favor acute. Subtle linear sclerotic line in the left inferior pubic ramus is equivocal for a nondisplaced fracture, possibly a    nondisplaced fracture. CT of the pelvis could assess further.      Moderate degenerative changes left hip. No dislocation. There is no fracture of the more distal femur.      NOTE: ABNORMAL REPORT      THE DICTATION ABOVE DESCRIBES AN ABNORMALITY FOR WHICH FOLLOW-UP IS NEEDED.             XR Pelvis 1/2 Views   Final Result   IMPRESSION: Bones are demineralized. There is a right total hip replacement.  Nondisplaced left parasymphyseal fracture, favor acute. Subtle linear sclerotic line in the left inferior pubic ramus is equivocal for a nondisplaced fracture, possibly a    nondisplaced fracture. CT of the pelvis could assess further.      Moderate degenerative changes left hip. No dislocation. There is no fracture of the more distal femur.      NOTE: ABNORMAL REPORT      THE DICTATION ABOVE DESCRIBES AN ABNORMALITY FOR WHICH FOLLOW-UP IS NEEDED.                 PROCEDURES:   None.       [facility] Documentation:   CMS Diagnoses:               I, Teja Noriega, am serving as a scribe to document services personally performed by Christa Beth MD based on my observation and the provider's statements to me. I, Christa Beth MD, attest that Teja Noriega is acting in a scribe capacity, has observed my performance of the services and has documented them in accordance with my direction.    Christa Beth MD  [facility] EMERGENCY ROOM  [address] [id number] [phone]       Christa Beth MD  06/22/23 6938

## 2023-06-10 NOTE — ED TRIAGE NOTES
Triage Assessment     Row Name 06/10/23 2343       Triage Assessment (Adult)    Airway WDL WDL       Respiratory WDL    Respiratory WDL WDL       Skin Circulation/Temperature WDL    Skin Circulation/Temperature WDL WDL       Cardiac WDL    Cardiac WDL WDL       Peripheral/Neurovascular WDL    Peripheral Neurovascular WDL WDL       Cognitive/Neuro/Behavioral WDL    Cognitive/Neuro/Behavioral WDL WDL            Was walking the dog approx 1300 and dog started chasing a squirrel.  Pulled off feet and landed on left hip. Complaining of pain to left hip radiating into left groin, denies any other injury. Denies thinners and did not strike head. Good distal pulses and sensation.  History or osteopenia.  Has had hip replacement to right side but no problems with left hip

## 2023-06-11 ENCOUNTER — APPOINTMENT (OUTPATIENT)
Dept: PHYSICAL THERAPY | Facility: CLINIC | Age: 78
End: 2023-06-11
Attending: HOSPITALIST
Payer: COMMERCIAL

## 2023-06-11 ENCOUNTER — APPOINTMENT (OUTPATIENT)
Dept: OCCUPATIONAL THERAPY | Facility: CLINIC | Age: 78
End: 2023-06-11
Attending: HOSPITALIST
Payer: COMMERCIAL

## 2023-06-11 PROCEDURE — 97161 PT EVAL LOW COMPLEX 20 MIN: CPT | Mod: GP

## 2023-06-11 PROCEDURE — G0378 HOSPITAL OBSERVATION PER HR: HCPCS

## 2023-06-11 PROCEDURE — 97116 GAIT TRAINING THERAPY: CPT | Mod: GP

## 2023-06-11 PROCEDURE — 96375 TX/PRO/DX INJ NEW DRUG ADDON: CPT

## 2023-06-11 PROCEDURE — 250N000011 HC RX IP 250 OP 636: Performed by: HOSPITALIST

## 2023-06-11 PROCEDURE — 99232 SBSQ HOSP IP/OBS MODERATE 35: CPT | Performed by: HOSPITALIST

## 2023-06-11 PROCEDURE — 250N000013 HC RX MED GY IP 250 OP 250 PS 637: Performed by: HOSPITALIST

## 2023-06-11 PROCEDURE — 96374 THER/PROPH/DIAG INJ IV PUSH: CPT

## 2023-06-11 PROCEDURE — 97165 OT EVAL LOW COMPLEX 30 MIN: CPT | Mod: GO

## 2023-06-11 PROCEDURE — 97535 SELF CARE MNGMENT TRAINING: CPT | Mod: GO

## 2023-06-11 RX ORDER — OXYCODONE HYDROCHLORIDE 5 MG/1
5 TABLET ORAL EVERY 6 HOURS PRN
Qty: 12 TABLET | Refills: 0 | Status: CANCELLED | OUTPATIENT
Start: 2023-06-11

## 2023-06-11 RX ORDER — ONDANSETRON 2 MG/ML
4 INJECTION INTRAMUSCULAR; INTRAVENOUS EVERY 6 HOURS PRN
Status: DISCONTINUED | OUTPATIENT
Start: 2023-06-11 | End: 2023-06-12 | Stop reason: HOSPADM

## 2023-06-11 RX ORDER — ACETAMINOPHEN 500 MG
1000 TABLET ORAL EVERY 6 HOURS PRN
Qty: 56 TABLET | Refills: 0 | Status: CANCELLED | OUTPATIENT
Start: 2023-06-11

## 2023-06-11 RX ADMIN — ONDANSETRON 4 MG: 2 INJECTION INTRAMUSCULAR; INTRAVENOUS at 13:41

## 2023-06-11 RX ADMIN — OXYCODONE HYDROCHLORIDE 5 MG: 5 TABLET ORAL at 09:32

## 2023-06-11 RX ADMIN — SIMVASTATIN 40 MG: 20 TABLET, FILM COATED ORAL at 23:45

## 2023-06-11 RX ADMIN — ASPIRIN 81 MG: 81 TABLET, COATED ORAL at 07:53

## 2023-06-11 RX ADMIN — ACETAMINOPHEN 975 MG: 325 TABLET ORAL at 16:12

## 2023-06-11 RX ADMIN — ACETAMINOPHEN 975 MG: 325 TABLET ORAL at 23:45

## 2023-06-11 RX ADMIN — ACETAMINOPHEN 975 MG: 325 TABLET ORAL at 00:31

## 2023-06-11 RX ADMIN — OXYCODONE HYDROCHLORIDE 5 MG: 5 TABLET ORAL at 19:33

## 2023-06-11 RX ADMIN — ACETAMINOPHEN 975 MG: 325 TABLET ORAL at 07:53

## 2023-06-11 RX ADMIN — LEVOTHYROXINE SODIUM 75 MCG: 75 TABLET ORAL at 05:54

## 2023-06-11 RX ADMIN — SERTRALINE HYDROCHLORIDE 75 MG: 25 TABLET ORAL at 07:52

## 2023-06-11 RX ADMIN — HYDROMORPHONE HYDROCHLORIDE 0.5 MG: 1 INJECTION, SOLUTION INTRAMUSCULAR; INTRAVENOUS; SUBCUTANEOUS at 12:11

## 2023-06-11 RX ADMIN — OXYCODONE HYDROCHLORIDE 5 MG: 5 TABLET ORAL at 04:33

## 2023-06-11 ASSESSMENT — ACTIVITIES OF DAILY LIVING (ADL)
ADLS_ACUITY_SCORE: 36
WEAR_GLASSES_OR_BLIND: YES
CHANGE_IN_FUNCTIONAL_STATUS_SINCE_ONSET_OF_CURRENT_ILLNESS/INJURY: YES
ADLS_ACUITY_SCORE: 35
ADLS_ACUITY_SCORE: 35
ADLS_ACUITY_SCORE: 21
CONCENTRATING,_REMEMBERING_OR_MAKING_DECISIONS_DIFFICULTY: NO
ADLS_ACUITY_SCORE: 21
PREVIOUS_RESPONSIBILITIES: MEAL PREP;HOUSEKEEPING;LAUNDRY;SHOPPING;YARDWORK;MEDICATION MANAGEMENT;FINANCES;DRIVING
FALL_HISTORY_WITHIN_LAST_SIX_MONTHS: YES
DRESSING/BATHING_DIFFICULTY: NO
DIFFICULTY_EATING/SWALLOWING: NO
ADLS_ACUITY_SCORE: 35
TOILETING_ISSUES: NO
ADLS_ACUITY_SCORE: 21
TRANSFERRING: 0-->INDEPENDENT
NUMBER_OF_TIMES_PATIENT_HAS_FALLEN_WITHIN_LAST_SIX_MONTHS: 1
WALKING_OR_CLIMBING_STAIRS: AMBULATION DIFFICULTY, REQUIRES EQUIPMENT
ADLS_ACUITY_SCORE: 35
ADLS_ACUITY_SCORE: 21
DOING_ERRANDS_INDEPENDENTLY_DIFFICULTY: NO
WALKING_OR_CLIMBING_STAIRS_DIFFICULTY: YES
DEPENDENT_IADLS:: INDEPENDENT
ADLS_ACUITY_SCORE: 35
TRANSFERRING: 0-->INDEPENDENT
ADLS_ACUITY_SCORE: 35
ADLS_ACUITY_SCORE: 21

## 2023-06-11 NOTE — PHARMACY-ADMISSION MEDICATION HISTORY
Pharmacist Admission Medication History    Admission medication history is complete. The information provided in this note is only as accurate as the sources available at the time of the update.    Medication reconciliation/reorder completed by provider prior to medication history? No    Information Source(s): Patient and CareEverywhere/SureScripts via in-person    Pertinent Information:     Changes made to PTA medication list:    Added: artificial tears    Deleted: None    Changed: none    Medication Affordability: no issue     Allergies reviewed with patient and updates made in EHR: yes    Medication History Completed By: Inge Berman RPH 6/10/2023 7:38 PM    Prior to Admission medications    Medication Sig Last Dose Taking? Auth Provider Long Term End Date   aspirin 81 MG EC tablet [ASPIRIN 81 MG EC TABLET] Take 1 tablet (81 mg total) by mouth daily. 6/10/2023 at 0930 Yes Itzel Hanson MD Yes    calcium citrate-vitamin D3 (CITRACAL + D) 315-250 mg-unit per tablet Take 1 tablet by mouth At Bedtime 6/9/2023 at 2100 Yes Provider, Historical     cholecalciferol, vitamin D3, (VITAMIN D3) 1,000 unit capsule Take 1,000 Units by mouth At Bedtime 6/9/2023 at 2100 Yes Provider, Historical     hypromellose (ARTIFICIAL TEARS) 0.5 % SOLN ophthalmic solution Place 1 drop into both eyes every hour as needed for dry eyes 6/9/2023 at unknown Yes Unknown, Entered By History     levothyroxine (SYNTHROID/LEVOTHROID) 75 MCG tablet Take 1 tablet (75 mcg) by mouth daily 6/10/2023 at 0815 Yes Feng Doherty MD Yes    multivitamin therapeutic (THERAGRAN) tablet [MULTIVITAMIN THERAPEUTIC (THERAGRAN) TABLET] Take 1 tablet by mouth daily.  Patient taking differently: Take 1 tablet by mouth At Bedtime 6/9/2023 at 2100 Yes Sapphire Olsen     sertraline (ZOLOFT) 25 MG tablet [SERTRALINE (ZOLOFT) 25 MG TABLET] TAKE 3 TABLETS BY MOUTH EVERY DAY 6/10/2023 at 0930 Yes Feng Doherty MD Yes    simvastatin (ZOCOR) 40 MG  tablet TAKE 1 TABLET BY MOUTH EVERYDAY AT BEDTIME 6/9/2023 at 2100 Yes Feng Doherty MD Yes    valACYclovir (VALTREX) 500 MG tablet Take 1 tablet (500 mg) by mouth 2 times daily for 3 days  Patient taking differently: Take 500 mg by mouth 2 times daily as needed flare Past Month at unknown Yes Feng Doherty MD Yes 6/10/23

## 2023-06-11 NOTE — PROGRESS NOTES
"[facility]    Medicine Progress Note - Hospitalist Service    Date of Admission:  6/10/2023    Assessment & Plan   Rios Michael is a 77 year old female presenting with pelvic fractures s/p fall.  She is clinically stable.  WBAT per ortho, plan for outpatient followup.  PT/OT evaluation, pain control.    # Pelvic fractures  - scheduled tylenol, oxy and hydromorphone prn  - PT/OT     # CAD  - ASA          Diet: Regular Diet Adult    Salvador Catheter: Not present  Lines: None     Cardiac Monitoring: None  Code Status: Full Code      Clinically Significant Risk Factors Present on Admission                # Drug Induced Platelet Defect: home medication list includes an antiplatelet medication        # Overweight: Estimated body mass index is 25.65 kg/m  as calculated from the following:    Height as of this encounter: 1.562 m (5' 1.5\").    Weight as of this encounter: 62.6 kg (138 lb).            Disposition Plan      Expected Discharge Date: 06/12/2023                  Teja Kim MD  Hospitalist Service  [facility]  Securely message with [employer] (more info)  Text page via [employer] Paging/Directory   ______________________________________________________________________    Interval History   Patient was able to get to the bathroom with assistance and a walker.  She felt pain was the primary limitation to her mobility.  She vomited after having pain medication without food, and she vomited due to pain after walking.  Denies numbness or tingling in the legs.    Physical Exam   Vital Signs: Temp: 97.9  F (36.6  C) Temp src: Oral BP: 126/59 Pulse: 68   Resp: 18 SpO2: 94 % O2 Device: Nasal cannula    Weight: 138 lbs 0 oz    Gen:  Lying comfortably in chair, nad  Neuro: alert, conversant  CV: nl rate, regular rhythm to palpation  Pulm:  No acute resp distress  GI:  Abdomen soft, NTTP  Ex: able to move left foot      Medical Decision Making             Data   CT " pelvis  IMPRESSION:  1.  There are acute fractures of the left parasymphyseal region, left inferior pubic ramus, left sacral ala and left puboacetabular region.     2.  Moderate degenerative changes in the left hip.     3.  Prior right total hip arthroplasty.

## 2023-06-11 NOTE — PROGRESS NOTES
[facility]  OUTPATIENT PHYSICAL THERAPY EVALUATION  PLAN OF TREATMENT FOR OUTPATIENT REHABILITATION  (COMPLETE FOR INITIAL CLAIMS ONLY)  Patient's Last Name, First Name, M.I.  YOB: 1945  Rios Michael                        Provider's Name  [facility] Medical Record No.  [id number]                             Onset Date:  06/10/23   Start of Care Date:  06/11/23   Type:     _X_PT   ___OT   ___SLP Medical Diagnosis:  Pelvic fracture              PT Diagnosis:  Impaired functional mobility Visits from SOC:  1     See note for plan of treatment, functional goals and certification details    I CERTIFY THE NEED FOR THESE SERVICES FURNISHED UNDER        THIS PLAN OF TREATMENT AND WHILE UNDER MY CARE     (Physician co-signature of this document indicates review and certification of the therapy plan).                06/11/23 4553   Appointment Info   Signing Clinician's Name / Credentials (PT) Aimee Hickman, PT, DPT   Quick Adds   Quick Adds Certification   Living Environment   People in Home alone   Current Living Arrangements house   Home Accessibility stairs to enter home   Number of Stairs, Main Entrance 2   Stair Railings, Main Entrance none   Transportation Anticipated family or friend will provide   Living Environment Comments Patient lives alone in a rambler. Patient's 4 adult children live locally and are able to assist as needed. Patient's daughter will be caring for her dog while she recovers.   Self-Care   Usual Activity Tolerance good   Current Activity Tolerance fair   Equipment Currently Used at Home none   Fall history within last six months yes   Number of times patient has fallen within last six months 2  (Both falls occurred while walking her dog.)   Activity/Exercise/Self-Care Comment Patient is independent at baseline but does own a walker from her previous hip surgery.   General Information   Onset of Illness/Injury or  "Date of Surgery 06/10/23   Referring Physician Teja Kim MD   Patient/Family Therapy Goals Statement (PT) Return to PLOF   Pertinent History of Current Problem (include personal factors and/or comorbidities that impact the POC) Per chart, \"Rios Michael is a 77 year old female presenting with pelvic fractures s/p fall.\"   Existing Precautions/Restrictions weight bearing   Weight-Bearing Status - LLE weight-bearing as tolerated   Weight-Bearing Status - RLE full weight-bearing   Range of Motion (ROM)   Range of Motion ROM is WFL   Strength (Manual Muscle Testing)   Strength (Manual Muscle Testing) strength is WFL   Bed Mobility   Bed Mobility supine-sit;sit-supine   Supine-Sit Dane (Bed Mobility) independent   Sit-Supine Dane (Bed Mobility) independent   Impairments Contributing to Impaired Bed Mobility pain   Transfers   Transfers sit-stand transfer   Maintains Weight-bearing Status (Transfers) able to maintain   Impairments Contributing to Impaired Transfers pain   Sit-Stand Transfer   Sit-Stand Dane (Transfers) supervision;verbal cues   Assistive Device (Sit-Stand Transfers) walker, standard   Comment, (Sit-Stand Transfer) Verbal cues for hand placement.   Gait/Stairs (Locomotion)   [place] Level (Gait) supervision;verbal cues   Assistive Device (Gait) walker, standard   Distance in Feet 10'   Distance in Feet (Gait) 150'   Pattern (Gait) step-to   Deviations/Abnormal Patterns (Gait) antalgic;jackie decreased   Maintains Weight-bearing Status (Gait) able to maintain   Clinical Impression   Criteria for Skilled Therapeutic Intervention Yes, treatment indicated   PT Diagnosis (PT) Impaired functional mobility   Influenced by the following impairments Pain   Functional limitations due to impairments Transfers, gait, stairs   Clinical Presentation (PT Evaluation Complexity) Stable/Uncomplicated   Clinical Presentation Rationale Patient presents as medically diagnosed.   Clinical " Decision Making (Complexity) low complexity   Planned Therapy Interventions (PT) gait training;home exercise program;patient/family education;stair training;strengthening;transfer training;home program guidelines   Anticipated Equipment Needs at Discharge (PT) walker, standard   Risk & Benefits of therapy have been explained evaluation/treatment results reviewed;care plan/treatment goals reviewed;patient;son   PT Total Evaluation Time   PT Eval, Low Complexity Minutes (74434) 10   Therapy Certification   Start of care date 06/11/23   Certification date from 06/11/23   Certification date to 06/18/23   Medical Diagnosis Pelvic fracture   Physical Therapy Goals   PT Frequency Daily   PT Predicted Duration/Target Date for Goal Attainment 06/18/23   PT Goals Transfers;Gait;Stairs   PT: Transfers Modified independent;Sit to/from stand;Assistive device   PT: Gait Modified independent;Assistive device;150 feet   PT: Stairs Minimal assist;2 stairs   Interventions   Interventions Quick Adds Gait Training   Gait Training   Gait Training Minutes (28164) 15   Symptoms Noted During/After Treatment (Gait Training) increased pain   Treatment Detail/Skilled Intervention Patient ambulated 150' with supervision and standard walker. Verbal cues for forward gaze and upright posture, otherwise no concerns. Patient navigated 4 stairs with CGA and R railing. Verbal cues for sequencing. Non-reciprocal pattern. Patient reports her adult children can provide HHA on stairs at home.   [place] Level (Gait Training) stand-by assist   Physical Assistance Level (Gait Training) supervision;verbal cues   Weight Bearing (Gait Training) weight-bearing as tolerated   Assistive Device (Gait Training) standard walker   Gait Analysis Deviations decreased jackie;decreased step length   Impairments (Gait Analysis/Training) pain   Stair Railings present on right side   Physical Assist/Nonphysical Assist (Stairs) supervision;verbal cues   Level of  [place] (Stairs) contact guard   PT Discharge Planning   PT Plan Transfers, gait, 2 JAYCEE without railing   PT Discharge Recommendation (DC Rec) (S)  home with assist;home with home care physical therapy;home with outpatient physical therapy   PT Rationale for DC Rec Patient is ambulating and navigating stairs safely with SBA-CGA. Patient has good support from her 4 adult children. Patient would benefit from continued PT to return to PLOF. Home PT vs. OP PT pending ability to coordinate transportation.   PT Brief overview of current status SBA-CGA for ambulation and stair navigation.   Total Session Time   Timed Code Treatment Minutes 15   Total Session Time (sum of timed and untimed services) 25

## 2023-06-11 NOTE — H&P
"[facility]    History and Physical - Hospitalist Service       Date of Admission:  6/10/2023    Assessment & Plan      Rios Michael is a 77 year old female presenting with a pelvic fracture s/p fall.  She is clinically stable.  Plan for pain control, ortho evaluation.    # Pelvic fracture  - orthopaedics consult in AM  - NWB LLE  - scheduled tylenol, oxy and hydromorphone prn  - PT/OT after ortho consult    # CAD  - ASA         Diet: Regular Diet Adult  NPO per Anesthesia Guidelines for Procedure/Surgery Except for: Meds  [place]vador Catheter: Not present  Lines: None     Cardiac Monitoring: None  Code Status: Full Code    Clinically Significant Risk Factors Present on Admission                # Drug Induced Platelet Defect: home medication list includes an antiplatelet medication        # Overweight: Estimated body mass index is 25.65 kg/m  as calculated from the following:    Height as of this encounter: 1.562 m (5' 1.5\").    Weight as of this encounter: 62.6 kg (138 lb).            Disposition Plan      Expected Discharge Date: 06/12/2023                  Teja Kim MD  Hospitalist Service  [facility]  Securely message with [employer] (more info)  Text page via [facility] Paging/Directory     ______________________________________________________________________    Chief Complaint   Fall    History is obtained from the patient    History of Present Illness   Rios Michael is a 77 year old female who present with a fall.  She reports a history of coronary artery disease, osteopenia, thyroid disease, HLD, and anxiety.  She reports a family history of clots.    She was walking her dog and the dog pulled her suddenly.  She fell on the ground.  She was able to get up to stand but was unable to bear weight on the left leg.  She then called 911 and presented to the ED.    She reports soreness in her left hip.  She is able to life her leg.  She also reports soreness on her " left side.  Denies chest pain/pressure or dyspnea.  Has a history of R hip replacement with subsequent femur fracture.    She was able to walk in the ED with a walker but had pain associated with weight bearing in the left leg.      Past Medical History    Past Medical History:   Diagnosis Date     Arthritis      Breast cyst [id number]     Coronary artery disease      Depression      Disease of thyroid gland      Family history of clotting disorder      Herpes simplex virus (HSV) infection     Created by Conversion  Replacement Utility updated for latest IMO load     High cholesterol      Hypertension      Status post hip replacement, right 11/12/2019       Past Surgical History   Past Surgical History:   Procedure Laterality Date     BREAST CYST EXCISION  [id number]    Removed from [facility]     CARDIAC CATHETERIZATION       CORONARY STENT PLACEMENT       RI PUNC/ASPIR BREAST CYST      Description: Breast Surgery Puncture Aspiration Of Cyst;  Recorded: 07/29/2008;     Z TOTAL HIP ARTHROPLASTY Right 11/12/2019    Procedure: RIGHT DIRECT ANTERIOR TOTAL HIP ARTHROPLASTY;  Surgeon: Collin Perez MD;  Location: [facility];  Service: Orthopedics     [facility] CORONARY STENT PERCUT, INITIAL VESSEL      Description: Cath Stent Placement;  Recorded: 06/11/2012;  Comments: ADRIANA to mid RCA       Prior to Admission Medications   Prior to Admission Medications   Prescriptions Last Dose Informant Patient Reported? Taking?   aspirin 81 MG EC tablet 6/10/2023 at 0930  No Yes   Sig: [ASPIRIN 81 MG EC TABLET] Take 1 tablet (81 mg total) by mouth daily.   calcium citrate-vitamin D3 (CITRACAL + D) 315-250 mg-unit per tablet 6/9/2023 at 2100  Yes Yes   Sig: Take 1 tablet by mouth At Bedtime   cholecalciferol, vitamin D3, (VITAMIN D3) 1,000 unit capsule 6/9/2023 at 2100  Yes Yes   Sig: Take 1,000 Units by mouth At Bedtime   hypromellose (ARTIFICIAL TEARS) 0.5 % SOLN ophthalmic solution 6/9/2023 at unknown  Yes Yes   Sig: Place 1  drop into both eyes every hour as needed for dry eyes   levothyroxine (SYNTHROID/LEVOTHROID) 75 MCG tablet 6/10/2023 at 0815  No Yes   Sig: Take 1 tablet (75 mcg) by mouth daily   multivitamin therapeutic (THERAGRAN) tablet 6/9/2023 at 2100  No Yes   Sig: [MULTIVITAMIN THERAPEUTIC (THERAGRAN) TABLET] Take 1 tablet by mouth daily.   Patient taking differently: Take 1 tablet by mouth At Bedtime   sertraline (ZOLOFT) 25 MG tablet 6/10/2023 at 0930  No Yes   Sig: [SERTRALINE (ZOLOFT) 25 MG TABLET] TAKE 3 TABLETS BY MOUTH EVERY DAY   simvastatin (ZOCOR) 40 MG tablet 6/9/2023 at 2100  No Yes   Sig: TAKE 1 TABLET BY MOUTH EVERYDAY AT BEDTIME   valACYclovir (VALTREX) 500 MG tablet Past Month at unknown  No Yes   Sig: Take 1 tablet (500 mg) by mouth 2 times daily for 3 days   Patient taking differently: Take 500 mg by mouth 2 times daily as needed flare      Facility-Administered Medications: None           Physical Exam   Vital Signs: Temp: 98.1  F (36.7  C) Temp src: Oral BP: 134/63 Pulse: 83   Resp: 16 SpO2: 93 % O2 Device: None (Room air)    Weight: 138 lbs 0 oz    Gen:  Lying in bed, NAD  Neuro: alert, conversant  CV: nl rate, regular rhythm  Pulm: no acute resp distress, CTAB anteriorly  GI:  Abdomen soft, NTTP  MSK:  Left groin TTP      Medical Decision Making             Data   Reviewed:  XR pelvis/femur  IMPRESSION: Bones are demineralized. There is a right total hip replacement.  Nondisplaced left parasymphyseal fracture, favor acute. Subtle linear sclerotic line in the left inferior pubic ramus is equivocal for a nondisplaced fracture, possibly a   nondisplaced fracture. CT of the pelvis could assess further.     Moderate degenerative changes left hip. No dislocation. There is no fracture of the more distal femur.    XR chest/ribs  IMPRESSION: The visualized heart and lungs are negative. No rib fractures.    XR l spine  IMPRESSION: There is diffuse osseous demineralization that limits evaluation for fracture.  Nomenclature is based on 5 lumbar vertebral bodies. Mild presumed degenerative endplate remodeling of the apposing endplates at the L2-L3 level, as before. No   other gross lumbar vertebral body height loss is identified. There is again mild dextroconvex curvature with apex at L2. Alignment otherwise appears within normal limits. There is severe disc space narrowing at L2-L3, as before. Relatively lesser degrees   of minimal/mild disc space narrowing elsewhere. Multilevel degenerative facet disease. Calcified phlebolith projecting over the left pelvis, as before. Partially visualized right hip prosthesis. A few amorphous calcifications in the left upper quadrant   of the abdomen are noted, nonspecific, possibly vascular calcifications.

## 2023-06-11 NOTE — PROGRESS NOTES
Patient alert and oriented, c/o left hip pain given Tylenol and oxycodone with good effect. Appears to have rested well.

## 2023-06-11 NOTE — PROGRESS NOTES
06/11/23 1317   Appointment Info   Signing Clinician's Name / Credentials (OT) Lynda Donovan OTR/L   Living Environment   People in Home alone  (dtr in law is OT (professor @ [employer] willing to connect if wanted), 4 children in the area to A)   Current Living Arrangements house  (rambler)   Home Accessibility stairs to enter home   Number of Stairs, Main Entrance 2   Transportation Anticipated family or friend will provide   Living Environment Comments walk in shower, shower chair, HRT- does have safety frame   Self-Care   Usual Activity Tolerance good   Current Activity Tolerance fair   Equipment Currently Used at Home walker, standard;shower chair  (reacher, safety frame for toliet)   Fall history within last six months yes   Number of times patient has fallen within last six months 1   Activity/Exercise/Self-Care Comment ind at baseline in all ADLs   Instrumental Activities of Daily Living (IADL)   Previous Responsibilities meal prep;housekeeping;laundry;shopping;yardwork;medication management;finances;driving  (taking care of grand kids)   IADL Comments can get A from family as needed   General Information   Onset of Illness/Injury or Date of Surgery 06/10/23   Referring Physician Teja Kim   Patient/Family Therapy Goal Statement (OT) To get back to being ind., to be able to play golf again   Performance Patterns (Routines, Roles, Habits) mother, grandmother   Existing Precautions/Restrictions fall   Sensory   Sensory Comments per pt none   Pain Assessment   Patient Currently in Pain Yes, see Vital Sign flowsheet  (410)   Clinical Impression   Criteria for Skilled Therapeutic Interventions Met (OT) Yes, treatment indicated   OT Diagnosis Decreased ADLs and functional mobility   Influenced by the following impairments per ortho note: The patient is a 77 year old female with c/o left hip/pelvis pain.  Yesterday (6/10/23) while out walking her dog the dog pulled her suddenly causing her to land onto the  left hip. She was unable to weight bear and ultimately brought to the ED via EMS. Was admitted for CT and further orthopedic evaluation. Has history of right POP with Dr. Collin Perez of [facility], currently still working with PT for the right hip   OT Problem List-Impairments impacting ADL problems related to;activity tolerance impaired;pain   Assessment of Occupational Performance 3-5 Performance Deficits   Identified Performance Deficits dressing, toileting, showering, IADLs   Planned Therapy Interventions (OT) ADL retraining;IADL retraining;bed mobility training   Clinical Decision Making Complexity (OT) low complexity   Anticipated Equipment Needs Upon Discharge (OT)   (sock aide)   Risk & Benefits of therapy have been explained evaluation/treatment results reviewed;patient;participants included;son   Clinical Impression Comments Pt aware of needs, has good support of family   OT Total Evaluation Time   OT Eval, Low Complexity Minutes (73880) 10   OT Goals   Therapy Frequency (OT) Daily   OT Predicted Duration/Target Date for Goal Attainment 06/17/23   OT Goals Lower Body Dressing;Hygiene/Grooming;Toilet Transfer/Toileting   OT: Hygiene/Grooming independent;while standing   OT: Lower Body Dressing Minimal assist;using adaptive equipment   OT: Toilet Transfer/Toileting Modified independent;cleaning and garment management   Interventions   Interventions Quick Adds Self-Care/Home Management   Self-Care/Home Management   Self-Care/Home Mgmt/ADL, Compensatory, Meal Prep Minutes (67136) 24   Symptoms Noted During/After Treatment (Meal Preparation/Planning Training) dizziness   Treatment Detail/Skilled Intervention upon arriva pt seated in recliner.Reports that she recieved pain medication and is feeling light headed. sitting edge of chair demos good dynamic and seated balance. Educated on LB dressing using AE. Doffed socks using reacher w/min A to initate LLE as socks are very tight. Doffed RLE ind. Demo'd  sock aid, unable to trial as sock aid too big for socks, would benefit from practice. dependent to don socks and tie shoes. Pt reports incresed nausea during ADL, following donning pt had emesis. Performs STS from chair to bed w/SBA and FWW. VC for set up and safe transfer for handplacement.Provided with stepping stool for increased safety into bed. educated on log roll technique to R side, pt declines reporting increased pain to hips. ind brings feet up into bed w/HOB raised. direct handoff to nurse.   OT Discharge Planning   OT Plan TB dressing w/AE, adaptions for hip fx for pain, toilet transfer, shower transfer, standing g/h   OT Discharge Recommendation (DC Rec) home with assist;home with home care occupational therapy   OT Rationale for DC Rec Pt would benefit from continued IP OT services and HH setting to improve strength, functional activity tolerance, safety, and adaptions to decrease pain needed for daily activities. HH services indicated as leaving enviornment would require significant effort at this time. Recp,,emd SBA for all transfers/mobility and max A for ADLs. Anticipate pt ill require assist with all  IADL's   Total Session Time   Timed Code Treatment Minutes 24   Total Session Time (sum of timed and untimed services) 34    [facility]  OUTPATIENT OCCUPATIONAL THERAPY  EVALUATION  PLAN OF TREATMENT FOR OUTPATIENT REHABILITATION  (COMPLETE FOR INITIAL CLAIMS ONLY)  Patient's Last Name, First Name, M.I.  YOB: 1945  Rios Michael                          Provider's Name  [facility] Medical Record No.  [id number]                             Onset Date:  06/10/23   Start of Care Date:  06/11/23   Type:     ___PT   _X_OT   ___SLP Medical Diagnosis:  pelvic fx                    OT Diagnosis:  Decreased ADLs and functional mobility Visits from SOC:  1     See note for plan of treatment, functional goals and certification  details    I CERTIFY THE NEED FOR THESE SERVICES FURNISHED UNDER        THIS PLAN OF TREATMENT AND WHILE UNDER MY CARE     (Physician co-signature of this document indicates review and certification of the therapy plan).

## 2023-06-11 NOTE — PLAN OF CARE
Goal Outcome Evaluation:         Patient up to unit from ED.    Patient alert and oriented, vitally stable.   Rates pain at 4/10, not requiring opioids.  Worked with PT.   Reports some intermittent dizziness with standing, nausea resolved.

## 2023-06-11 NOTE — UTILIZATION REVIEW
"Inpatient to Observation note:    Admission Status; Secondary Review Determination     Under the authority of the Utilization Management Committee, the utilization review process indicated a secondary review on the above patient.  The review outcome is based on review of the medical records, discussions with staff, and applying clinical experience noted on the date of the review.          (x) Observation Status Appropriate - This patient does not meet hospital inpatient criteria and is placed in observation status. If this patient's primary payer is Medicare and was admitted as an inpatient, Condition Code 44 should be used and patient status changed to \"observation\".     RATIONALE FOR DETERMINATION     77 years old female with past medical history of CAD, osteopenia, thyroid disease, anxiety and hyperlipidemia admitted after a fall found to have a pelvis fracture.  Continue with conservative management pain control PT and OT.    Consider changing to Inpt if pt pain is poorly controlled that she required frequent IV narcotic or other complication with her hospitalization      The severity of illness, intensity of service provided, expected LOS and risk for adverse outcome make the care appropriate for further observation; however, doesn't meet criteria for hospital inpatient admission. Dr Kim notified of this determination.        The information on this document is developed by the utilization review team in order for the business office to ensure compliance.  This only denotes the appropriateness of proper admission status and does not reflect the quality of care rendered.         The definitions of Inpatient Status and Observation Status used in making the determination above are those provided in the CMS Coverage Manual, Chapter 1 and Chapter 6, section 70.4.      Sincerely,  Alfredo Soni MD  Utilization Review  Physician Advisor  [facility].    "

## 2023-06-11 NOTE — CONSULTS
ORTHOPEDIC CONSULTATION    CHIEF COMPLAINT: left hip/pelvis pain      HISTORY OF PRESENT ILLNESS:  The patient is seen in orthopedic consultation at the request of Teja Graves MD.  The patient is a 77 year old female with c/o left hip/pelvis pain.  Yesterday (6/10/23) while out walking her dog the dog pulled her suddenly causing her to land onto the left hip. She was unable to weight bear and ultimately brought to the ED via EMS. Was admitted for CT and further orthopedic evaluation. Has history of right POP with Dr. Collin Perez of [facility], currently still working with PT for the right hip. Was able to ambulate in the ED over night with walker assistance, endorses left hip pain with weight bearing.       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  They do include:   (Not in a hospital admission)      SOCIAL HISTORY:   Social History     Tobacco Use     Smoking status: Never     Smokeless tobacco: Never   Substance Use Topics     Alcohol use: Yes     Alcohol/week: 5.0 standard drinks of alcohol     Drug use: No        FAMILY HISTORY:  Family History   Problem Relation Age of Onset     Osteoporosis Mother      Dementia Mother      Coronary Artery Disease Father         in 80s     Alzheimer Disease Father      Clotting Disorder Brother         PE at age 64     Other - See Comments Brother         Dementia? Heart issues     Coronary Artery Disease Brother          bypass     Diabetes Type 2  Brother      Pulmonary Embolism Son      Diabetes No family hx of        REVIEW OF SYSTEMS:   See Admission History and Physical   PHYSICAL EXAMINATION:    Temp:  [97.9  F (36.6  C)-98.1  F (36.7  C)] 97.9  F (36.6  C)  Pulse:  [64-83] 68  Resp:  [16-18] 18  BP: (113-144)/(56-67) 126/59  SpO2:  [90 %-99 %] 94 %    General: On examination, the patient is A&Ox3  SKIN/HAIR/NAILS: normal   Pulses:  Dorsalis pedis pulses intact  Sensation: intact bilateral lower extremities  Tenderness: left hip and posterior hemipelvis  ROM: discomfort with log roll of the left hip. No significant increase in pain with pelvic compression.   Crepitus: none  Instability:none  Motor: pain with SLR, full knee, ankle, foot ROM of the left.   Contralateral side= Full range of motion, Negative joint instability findings, 5/5 motor groups about the joint, Non-tender.     RADIOGRAPHIC EVALUATION:  CT scan results as follows:  1.  There are acute fractures of the left parasymphyseal region, left inferior pubic ramus, left sacral ala and left puboacetabular region.   2.  Moderate degenerative changes in the left hip.   3.  Prior right total hip arthroplasty.    Imaging and case reviewed with Dr. Fede Ye of [facility]    LABORATORY DATA:   Lab Results   Component Value Date    INR 1.10 11/12/2019       IMPRESSION:  LC1 pelvic ring fracture   Left inferior pubic ramus fracture  Left sacral ala fracture  Left puboacetabular fracture      PLAN:  Imaging and case reviewed with Dr. Fede Ye of [facility]  NO surgery indicated at this time  WBAT with walker assistance to the LLE  Follow up with orthopedics 2-3 weeks  DVT prophylaxis ASA 81mg BID    Vita Gonzalez PA-C

## 2023-06-11 NOTE — PROGRESS NOTES
Report given to TEDDY Fragoso 3 [facility] room [facility]

## 2023-06-12 ENCOUNTER — DOCUMENTATION ONLY (OUTPATIENT)
Dept: OTHER | Facility: CLINIC | Age: 78
End: 2023-06-12
Payer: COMMERCIAL

## 2023-06-12 ENCOUNTER — APPOINTMENT (OUTPATIENT)
Dept: PHYSICAL THERAPY | Facility: CLINIC | Age: 78
End: 2023-06-12
Payer: COMMERCIAL

## 2023-06-12 VITALS
HEIGHT: 62 IN | DIASTOLIC BLOOD PRESSURE: 61 MMHG | OXYGEN SATURATION: 93 % | BODY MASS INDEX: 25.4 KG/M2 | WEIGHT: 138 LBS | HEART RATE: 67 BPM | SYSTOLIC BLOOD PRESSURE: 118 MMHG | RESPIRATION RATE: 16 BRPM | TEMPERATURE: 97.5 F

## 2023-06-12 PROCEDURE — G0378 HOSPITAL OBSERVATION PER HR: HCPCS

## 2023-06-12 PROCEDURE — 99238 HOSP IP/OBS DSCHRG MGMT 30/<: CPT | Performed by: HOSPITALIST

## 2023-06-12 PROCEDURE — 250N000013 HC RX MED GY IP 250 OP 250 PS 637: Performed by: HOSPITALIST

## 2023-06-12 PROCEDURE — 97116 GAIT TRAINING THERAPY: CPT | Mod: GP

## 2023-06-12 PROCEDURE — 97110 THERAPEUTIC EXERCISES: CPT | Mod: GP

## 2023-06-12 PROCEDURE — 97535 SELF CARE MNGMENT TRAINING: CPT | Mod: GO

## 2023-06-12 RX ORDER — NALOXONE HYDROCHLORIDE 0.4 MG/ML
0.2 INJECTION, SOLUTION INTRAMUSCULAR; INTRAVENOUS; SUBCUTANEOUS
Status: DISCONTINUED | OUTPATIENT
Start: 2023-06-12 | End: 2023-06-12 | Stop reason: HOSPADM

## 2023-06-12 RX ORDER — NALOXONE HYDROCHLORIDE 0.4 MG/ML
0.4 INJECTION, SOLUTION INTRAMUSCULAR; INTRAVENOUS; SUBCUTANEOUS
Status: DISCONTINUED | OUTPATIENT
Start: 2023-06-12 | End: 2023-06-12 | Stop reason: HOSPADM

## 2023-06-12 RX ORDER — ACETAMINOPHEN 500 MG
1000 TABLET ORAL EVERY 8 HOURS
Qty: 30 TABLET | Refills: 0 | Status: SHIPPED | OUTPATIENT
Start: 2023-06-12 | End: 2023-12-15

## 2023-06-12 RX ADMIN — SERTRALINE HYDROCHLORIDE 75 MG: 25 TABLET ORAL at 07:41

## 2023-06-12 RX ADMIN — OXYCODONE HYDROCHLORIDE 5 MG: 5 TABLET ORAL at 10:21

## 2023-06-12 RX ADMIN — ASPIRIN 81 MG: 81 TABLET, COATED ORAL at 07:41

## 2023-06-12 RX ADMIN — ACETAMINOPHEN 975 MG: 325 TABLET ORAL at 07:41

## 2023-06-12 RX ADMIN — LEVOTHYROXINE SODIUM 75 MCG: 75 TABLET ORAL at 06:31

## 2023-06-12 ASSESSMENT — ACTIVITIES OF DAILY LIVING (ADL)
ADLS_ACUITY_SCORE: 21

## 2023-06-12 NOTE — DISCHARGE INSTRUCTIONS
[facility] will see you at home for Physical Therapy and Occupational Therapy.  This agency will call you in 1-2 days after discharge.  Phone number for [facility]: [phone].

## 2023-06-12 NOTE — PROGRESS NOTES
Physical Therapy Discharge Summary    Reason for therapy discharge:    Discharged to home with home therapy.    Progress towards therapy goal(s). See goals on Care Plan in [facility] electronic health record for goal details.  Goals met    Therapy recommendation(s):    Continue home exercise program.

## 2023-06-12 NOTE — PLAN OF CARE
Goal Outcome Evaluation:    PRIMARY DIAGNOSIS: ACUTE PAIN  OUTPATIENT/OBSERVATION GOALS TO BE MET BEFORE DISCHARGE:  1. Pain Status: Improved-controlled with oral pain medications.    2. Return to near baseline physical activity: Yes    3. Cleared for discharge by consultants (if involved): No    Discharge Planner Nurse   Safe discharge environment identified: Yes  Barriers to discharge: Yes       Entered by: Marisela Gasca RN 06/11/2023 10:12 PM     Please review provider order for any additional goals.   Nurse to notify provider when observation goals have been met and patient is ready for discharge.

## 2023-06-12 NOTE — CONSULTS
Care Management Initial Consult    General Information  Assessment completed with: Patient,    Type of CM/SW Visit: Initial Assessment    Primary Care Provider verified and updated as needed: Yes   Readmission within the last 30 days: no previous admission in last 30 days         Advance Care Planning: Advance Care Planning Reviewed:  (has HCD, thinks should have on file with HE. States all 4 of her children are HCA's and can act together or independently. CM sent email to [employer] requesting review to confirm if copy of HCD on file in system.)        Communication Assessment  Patient's communication style: spoken language (English or Bilingual)    Hearing Difficulty or Deaf: no   Wear Glasses or Blind: yes    Cognitive  Cognitive/Neuro/Behavioral: WDL                    Living Environment:   People in home: alone     Current living Arrangements: house (rambler)      Able to return to prior arrangements: yes       Family/Social Support:  Care provided by: self  Provides care for: pet(s) (1 dog)  Marital Status: Single  Children (4 children- 2 daughters and 2 sons. Teetee Sung, Oliver and Shirley. [place] in [place] area[place]   Description of Support System: Supportive, Involved         Current Resources:   Patient receiving home care services: No  Community Resources: None  Equipment/supplies currently used at home:  (Rx glasses.)    Employment/Financial:  Employment Status: retired     Employment/ Comments: no  or VA  Financial Concerns: No concerns identified   Referral to Financial Worker: No     Does the patient's insurance plan have a 3 day qualifying hospital stay waiver?  Yes   Will the waiver be used for post-acute placement? No    Lifestyle & Psychosocial Needs:  Social Determinants of Health     Tobacco Use: Low Risk  (6/11/2023)    Patient History      Smoking Tobacco Use: Never      Smokeless Tobacco Use: Never      Passive Exposure: Not on file   Alcohol Use: Not At Risk  (12/4/2022)    AUDIT-C      Frequency of Alcohol Consumption: 2-3 times a week      Average Number of Drinks: 1 or 2      Frequency of Binge Drinking: Never   Financial Resource Strain: Low Risk  (12/4/2022)    Overall Financial Resource Strain (CARDIA)      Difficulty of Paying Living Expenses: Not hard at all   Food Insecurity: No Food Insecurity (12/4/2022)    Hunger Vital Sign      Worried About Running Out of Food in the Last Year: Never true      Ran Out of Food in the Last Year: Never true   Transportation Needs: No Transportation Needs (12/4/2022)    PRAPARE - Transportation      Lack of Transportation (Medical): No      Lack of Transportation (Non-Medical): No   Physical Activity: Insufficiently Active (12/4/2022)    Exercise Vital Sign      Days of Exercise per Week: 2 days      Minutes of Exercise per Session: 20 min   Stress: No Stress Concern Present (12/4/2022)    [employer] [place] [employer] - Occupational Stress Questionnaire      Feeling of Stress : Not at all   Social Connections: Moderately Integrated (12/4/2022)    Social Connection and Isolation Panel [NHANES]      Frequency of Communication with Friends and Family: More than three times a week      Frequency of Social Gatherings with Friends and Family: Three times a week      Attends Jewish Services: More than 4 times per year      Active Member of Clubs or Organizations: Yes      Attends Club or Organization Meetings: Not on file      Marital Status:    Intimate Partner Violence: Not on file   Depression: Not at risk (1/12/2023)    PHQ-2      PHQ-2 Score: 0   Housing Stability: Unknown (12/4/2022)    Housing Stability Vital Sign      Unable to Pay for Housing in the Last Year: No      Number of Places Lived in the Last Year: 1      Unstable Housing in the Last Year: Not on file       Functional Status:  Prior to admission patient needed assistance:   Dependent ADLs:: Independent  Dependent IADLs::  "Independent  Assesssment of Functional Status: Not at baseline with mobility    Mental Health Status:  Mental Health Status: No Current Concerns  Mental Health Management: Medication, Psychiatrist    Chemical Dependency Status:  Chemical Dependency Status: No Current Concerns             Values/Beliefs:  Spiritual, Cultural Beliefs, Anabaptist Practices, Values that affect care: no          Values/Beliefs Comment: \"Episcopal\"    Additional Information:  Chart reviewed. PT recommendations for home with assist and Home PT vs OP PT. OT recommendations for home with assist and Home OT.     CM met with patient; assessment completed. Lives alone in [facility]- does have a dog (currently with one of her children). She is independent with all cares, including driving. Rx glasses but no other assistive devices. PCP confirmed. No private duty or skilled HC services.     CM provided MOON/observation notice and education. Has [facility] Medicare Advantage Plan. She verbalized understanding and signed. [place] in chart and copy given to patient.    Patient unsure at this time if she is interested in skilled HC services for PT/OT. Her daughter in law is an OT professor at [employer] and her family is supportive. States plan to either return home and one of the kids will stay with her for few nights vs she will go stay with one of them.     One of her children will provide transportation at hospital discharge.     She is agreeable with CM following up tomorrow to confirm if HC services are desired or not. She does not want CM sending any referrals at this time.     Kina Sloan RN        "

## 2023-06-12 NOTE — PLAN OF CARE
Infliximab levels are good.   Vit D normal.   Iron- is low normal.  Problem: Plan of Care - These are the overarching goals to be used throughout the patient stay.    Goal: Absence of Hospital-Acquired Illness or Injury  Intervention: Identify and Manage Fall Risk  Recent Flowsheet Documentation  Taken 6/12/2023 0905 by Ning Torres RN  Safety Promotion/Fall Prevention: safety round/check completed  Taken 6/12/2023 0745 by Ning Torres RN  Safety Promotion/Fall Prevention:    activity supervised    assistive device/personal items within reach    clutter free environment maintained    lighting adjusted    nonskid shoes/slippers when out of bed  Taken 6/12/2023 0700 by Ning Torres RN  Safety Promotion/Fall Prevention: safety round/check completed     Patient vital signs are at baseline: Yes  Patient able to ambulate as they were prior to admission or with assist devices provided by therapies during their stay:  Yes  Patient MUST void prior to discharge:  Yes  Patient able to tolerate oral intake:  Yes  Pain has adequate pain control using Oral analgesics:  Yes  Does patient have an identified :  Yes  Has goal D/C date and time been discussed with patient:  Yes    PRIMARY DIAGNOSIS: ACUTE PAIN  OUTPATIENT/OBSERVATION GOALS TO BE MET BEFORE DISCHARGE:  1. Pain Status: Improved-controlled with oral pain medications.    2. Return to near baseline physical activity: Yes    3. Cleared for discharge by consultants (if involved): Yes    Discharge Planner Nurse   Safe discharge environment identified: Yes  Barriers to discharge: No       Entered by: Ning Torres RN 06/12/2023 10:30 AM    Patient ready for discharge. Completed discharge paperwork with patient. Patient stated understanding and questions were answered. All belongings were sent with the patient. Discharged safely.  Ning Torres RN

## 2023-06-12 NOTE — DISCHARGE SUMMARY
"[facility]  Hospitalist Discharge Summary      Date of Admission:  6/10/2023  Date of Discharge:  6/12/2023 10:58 AM  Discharging Provider: Teja Kim MD  Discharge Service: Hospitalist Service    Discharge Diagnoses   Pelvic fractures    Clinically Significant Risk Factors     # Overweight: Estimated body mass index is 25.65 kg/m  as calculated from the following:    Height as of this encounter: 1.562 m (5' 1.5\").    Weight as of this encounter: 62.6 kg (138 lb).       Follow-ups Needed After Discharge   Follow-up Appointments     Follow-up and recommended labs and tests       Follow up with primary care provider, Feng Doherty, within 7   days for hospital follow- up.    Follow up with orthopaedics within 2 weeks.             Unresulted Labs Ordered in the Past 30 Days of this Admission     No orders found from 5/11/2023 to 6/11/2023.          Discharge Disposition   Discharged to home  Condition at discharge: Stable    Hospital Course   The patient was admitted after a fall and found to have multiple pelvic fractures.  Orthopaedics was consulted and she did not require operative intervention.  She was seen by PT/OT and discharged home with home services.    Consultations This Hospital Stay   ORTHOPEDIC SURGERY IP CONSULT  CARE MANAGEMENT / SOCIAL WORK IP CONSULT  PHYSICAL THERAPY ADULT IP CONSULT  OCCUPATIONAL THERAPY ADULT IP CONSULT    Code Status   Full Code    Time Spent on this Encounter   I, Teja Kim MD, personally saw the patient today and spent less than or equal to 30 minutes discharging this patient.       Teja Kim MD  [address] [address]  Phone: [phone]  Fax: [phone]  ______________________________________________________________________    Physical Exam   Vital Signs: Temp: 97.5  F (36.4  C) Temp src: Oral BP: 118/61 Pulse: 67   Resp: 16 SpO2: 93 % O2 Device: " None (Room air)    Weight: 138 lbs 0 oz    See progress note       Primary Care Physician   Feng Doherty    Discharge Orders      Orthopedic  Referral      Home Care Referral      Reason for your hospital stay    You were admitted to the hospital after a fall and found to have multiple pelvic fractures.  You did not require surgery.  You were discharged home with a referral to orthopaedics for follow up.     Follow-up and recommended labs and tests     Follow up with primary care provider, Feng Doherty, within 7 days for hospital follow- up.    Follow up with orthopaedics within 2 weeks.     Activity    Your activity upon discharge: activity as tolerated with walker     When to contact your care team    Call your primary doctor if you have any of the following: fevers, chills, numbness or tingling in the legs, discoloration in the legs, or worsening pain.     Diet    Follow this diet upon discharge: Orders Placed This Encounter      Regular Diet Adult       Significant Results and Procedures   Most Recent 3 CBC's:Recent Labs   Lab Test 12/08/21  1016 10/08/20  1011 11/13/19  0552 10/28/19  0950   WBC 4.3 4.0  --  4.1   HGB 13.3 14.4 9.1* 13.6   MCV 99 97  --  94    254  --  267     Most Recent 3 BMP's:Recent Labs   Lab Test 01/05/23  0835 12/08/21  1016 10/08/20  1011    143 140   POTASSIUM 4.4 4.5 4.6   CHLORIDE 105 106 103   CO2 26 28 30   BUN 17.9 17 18   CR 0.87 0.85 0.87   ANIONGAP 12 9 7   ALEX 9.3 9.9 9.9   * 103 106   ,   Results for orders placed or performed during the hospital encounter of 06/10/23   XR Pelvis 1/2 Views    Narrative    EXAM: XR PELVIS 1/2 VIEWS, XR FEMUR LEFT 2 VIEWS  LOCATION: [facility]  DATE/TIME: 06/10/2023, 4:13 PM CDT    INDICATION: Fall. Pain in left groin.  COMPARISON: 11/12/2019.      Impression    IMPRESSION: Bones are demineralized. There is a right total hip replacement.  Nondisplaced left  parasymphyseal fracture, favor acute. Subtle linear sclerotic line in the left inferior pubic ramus is equivocal for a nondisplaced fracture, possibly a   nondisplaced fracture. CT of the pelvis could assess further.    Moderate degenerative changes left hip. No dislocation. There is no fracture of the more distal femur.    NOTE: ABNORMAL REPORT    THE DICTATION ABOVE DESCRIBES AN ABNORMALITY FOR WHICH FOLLOW-UP IS NEEDED.        XR Femur Left 2 Views    Narrative    EXAM: XR PELVIS 1/2 VIEWS, XR FEMUR LEFT 2 VIEWS  LOCATION: [facility]  DATE/TIME: 06/10/2023, 4:13 PM CDT    INDICATION: Fall. Pain in left groin.  COMPARISON: 11/12/2019.      Impression    IMPRESSION: Bones are demineralized. There is a right total hip replacement.  Nondisplaced left parasymphyseal fracture, favor acute. Subtle linear sclerotic line in the left inferior pubic ramus is equivocal for a nondisplaced fracture, possibly a   nondisplaced fracture. CT of the pelvis could assess further.    Moderate degenerative changes left hip. No dislocation. There is no fracture of the more distal femur.    NOTE: ABNORMAL REPORT    THE DICTATION ABOVE DESCRIBES AN ABNORMALITY FOR WHICH FOLLOW-UP IS NEEDED.        XR Lumbar Spine 2/3 Views    Narrative    EXAM: XR LUMBAR SPINE 2/3 VIEWS  LOCATION: [facility]  DATE/TIME: 6/10/2023 4:14 PM CDT    INDICATION: Fall, left lower back pain.  COMPARISON: 05/24/2018.      Impression    IMPRESSION: There is diffuse osseous demineralization that limits evaluation for fracture. Nomenclature is based on 5 lumbar vertebral bodies. Mild presumed degenerative endplate remodeling of the apposing endplates at the L2-L3 level, as before. No   other gross lumbar vertebral body height loss is identified. There is again mild dextroconvex curvature with apex at L2. Alignment otherwise appears within normal limits. There is severe disc space narrowing at L2-L3, as before.  Relatively lesser degrees   of minimal/mild disc space narrowing elsewhere. Multilevel degenerative facet disease. Calcified phlebolith projecting over the left pelvis, as before. Partially visualized right hip prosthesis. A few amorphous calcifications in the left upper quadrant   of the abdomen are noted, nonspecific, possibly vascular calcifications.   Ribs XR, unilat 3 views + PA chest,  left    Narrative    EXAM: XR RIBS AND CHEST LEFT 3 VIEWS  LOCATION: [facility]  DATE/TIME: 6/10/2023 4:15 PM CDT    INDICATION: pain along lower left ribs after fall  COMPARISON: 03/05/2020      Impression    IMPRESSION: The visualized heart and lungs are negative. No rib fractures.   CT Pelvis Bone wo Contrast    Narrative    EXAM: CT PELVIS BONE WO CONTRAST  LOCATION: [facility]  DATE/TIME: 6/10/2023 9:40 PM CDT    INDICATION: Pelvic pain. Fracture.  COMPARISON: 06/10/2023 radiographs.  TECHNIQUE: CT scan of the pelvis was performed without IV contrast. Multiplanar reformats were obtained. Dose reduction techniques were used.  CONTRAST: None.    FINDINGS:    BONES: Acute nondisplaced left parasymphyseal fracture. Acute nondisplaced fracture of the left inferior pubic ramus. Acute mildly displaced left puboacetabular fracture as seen on series 4 image 60 and series 2 image 78. Acute nondisplaced fracture of   the left sacral ala. Moderate degenerative changes in the left hip. Right total hip arthroplasty.    SOFT TISSUES: No soft tissue edema or fluid collection. No muscle atrophy. Pelvic intraperitoneal contents normal.       Impression    IMPRESSION:  1.  There are acute fractures of the left parasymphyseal region, left inferior pubic ramus, left sacral ala and left puboacetabular region.    2.  Moderate degenerative changes in the left hip.    3.  Prior right total hip arthroplasty.       Discharge Medications   Discharge Medication List as of 6/12/2023 10:17 AM      START  taking these medications    Details   acetaminophen (TYLENOL) 500 MG tablet Take 2 tablets (1,000 mg) by mouth every 8 hours, Disp-30 tablet, R-0, E-Prescribe         CONTINUE these medications which have NOT CHANGED    Details   aspirin 81 MG EC tablet [ASPIRIN 81 MG EC TABLET] Take 1 tablet (81 mg total) by mouth daily., R-0, OTC      calcium citrate-vitamin D3 (CITRACAL + D) 315-250 mg-unit per tablet Take 1 tablet by mouth At Bedtime, Historical      cholecalciferol, vitamin D3, (VITAMIN D3) 1,000 unit capsule Take 1,000 Units by mouth At Bedtime, Historical      hypromellose (ARTIFICIAL TEARS) 0.5 % SOLN ophthalmic solution Place 1 drop into both eyes every hour as needed for dry eyes, Historical      levothyroxine (SYNTHROID/LEVOTHROID) 75 MCG tablet Take 1 tablet (75 mcg) by mouth daily, Disp-90 tablet, R-3, E-Prescribe      multivitamin therapeutic (THERAGRAN) tablet [MULTIVITAMIN THERAPEUTIC (THERAGRAN) TABLET] Take 1 tablet by mouth daily., Disp-90 tablet, R-1, Normal      sertraline (ZOLOFT) 25 MG tablet [SERTRALINE (ZOLOFT) 25 MG TABLET] TAKE 3 TABLETS BY MOUTH EVERY DAY, Disp-270 tablet, R-3, E-Prescribe      simvastatin (ZOCOR) 40 MG tablet TAKE 1 TABLET BY MOUTH EVERYDAY AT BEDTIME, Disp-90 tablet, R-3, E-Prescribe      valACYclovir (VALTREX) 500 MG tablet Take 1 tablet (500 mg) by mouth 2 times daily for 3 days, Disp-24 tablet, R-3, E-Prescribe           Allergies   Allergies   Allergen Reactions     Estradiol Hives     Evista [Raloxifene] Hives

## 2023-06-12 NOTE — PROGRESS NOTES
"[facility]    Medicine Progress Note - Hospitalist Service    Date of Admission:  6/10/2023    Assessment & Plan   Rios Michael is a 77 year old admitted with pelvic fractures s/p fall. Clinically stable, medically ready for discharge.    # Pelvic fractures  - scheduled tylenol  - home  PT/OT     # CAD  - ASA       Diet: Regular Diet Adult  Diet    Salvador Catheter: Not present  Lines: None     Cardiac Monitoring: None  Code Status: Full Code      Clinically Significant Risk Factors Present on Admission                # Drug Induced Platelet Defect: home medication list includes an antiplatelet medication        # Overweight: Estimated body mass index is 25.65 kg/m  as calculated from the following:    Height as of this encounter: 1.562 m (5' 1.5\").    Weight as of this encounter: 62.6 kg (138 lb).            Disposition Plan      Expected Discharge Date: 06/12/2023                  Teja Kim MD  Hospitalist Service  [facility]  Securely message with [employer] (more info)  Text page via [employer] Paging/Directory   ______________________________________________________________________    Interval History   Feeling better today. Pain is a 4/10.  Able to ambulate with walker to bathroom.  Tolerating PO.  Denies numbness or tingling in legs.  Has moved her bowels here in the hospital.    Physical Exam   Vital Signs: Temp: 97.5  F (36.4  C) Temp src: Oral BP: 118/61 Pulse: 67   Resp: 16 SpO2: 93 % O2 Device: None (Room air)    Weight: 138 lbs 0 oz    Gen:  Sitting comfortably in chair, nad  Neuro: alert, conversant  CV:  Nl rate, regular rhythm  Pulm:  No acute resp distress, ctab anteriorly  GI:  Abdomen soft, NTTP    Medical Decision Making             Data     "

## 2023-06-12 NOTE — PROGRESS NOTES
Care Management Discharge Note    Discharge Date: 06/12/2023       Discharge Disposition: Home Care    Discharge Services:  Home with Home Care-PT/OT  Discharge DME: Walker    Discharge Transportation: family or friend will provide      Does the patient's insurance plan have a 3 day qualifying hospital stay waiver? no  Will the waiver be used for post-acute placement? No    Education Provided on the Discharge Plan:  yes  Persons Notified of Discharge Plans: yes  Patient/Family in Agreement with the Plan: yes        Additional Information:  [facility] accepted patient(Gerda) for Home PT, OT.  Patient agrees to discharge POC and will have daughter Zuly transport home today.    Sarah Rios CM

## 2023-06-12 NOTE — PLAN OF CARE
Goal Outcome Evaluation:  PRIMARY DIAGNOSIS: ACUTE PAIN  OUTPATIENT/OBSERVATION GOALS TO BE MET BEFORE DISCHARGE:  1. Pain Status: Improved-controlled with oral pain medications.    2. Return to near baseline physical activity: Yes    3. Cleared for discharge by consultants (if involved): No    Discharge Planner Nurse   Safe discharge environment identified: Yes  Barriers to discharge: Yes       Entered by: Marisela Gasca RN 06/12/2023 3:15 AM     Please review provider order for any additional goals.   Nurse to notify provider when observation goals have been met and patient is ready for discharge.

## 2023-06-13 ENCOUNTER — TELEPHONE (OUTPATIENT)
Dept: PEDIATRICS | Facility: CLINIC | Age: 78
End: 2023-06-13
Payer: COMMERCIAL

## 2023-06-13 ENCOUNTER — PATIENT OUTREACH (OUTPATIENT)
Dept: CARE COORDINATION | Facility: CLINIC | Age: 78
End: 2023-06-13
Payer: COMMERCIAL

## 2023-06-13 NOTE — TELEPHONE ENCOUNTER
"Hospital/TCU/ED for chronic condition Discharge Protocol    \"Hi, my name is Terrence Saavedra RN, a registered nurse, and I am calling from [facility].  I am calling to follow up and see how things are going for you after your recent emergency visit/hospital/TCU stay.\"    Tell me how you are doing now that you are home?\" lives alone and has family living with her 24/7. No symptoms or concerns at this point. Had hip replaced in the past so is using her old equipment to move around.       Discharge Instructions    \"Let's review your discharge instructions.  What is/are the follow-up recommendations?  Pt. Response: Home care was ordered for PT/OT.     \"Has an appointment with your primary care provider been scheduled?\"   Yes. (confirm)    \"When you see the provider, I would recommend that you bring your medications with you.\"    Medications    \"Tell me what changed about your medicines when you discharged?\"    Changes to chronic meds?    0-1    \"What questions do you have about your medications?\"    None     New diagnoses of heart failure, COPD, diabetes, or MI?    No              Post Discharge Medication Reconciliation Status: discharge medications reconciled, continue medications without change.    Was MTM referral placed (*Make sure to put transitions as reason for referral)?   No    Call Summary    \"What questions or concerns do you have about your recent visit and your follow-up care?\"     none    \"If you have questions or things don't continue to improve, we encourage you contact us through the main clinic number (give number).  Even if the clinic is not open, triage nurses are available 24/7 to help you.     We would like you to know that our clinic has extended hours (provide information).  We also have urgent care (provide details on closest location and hours/contact info)\"      \"Thank you for your time and take care!\"         Per provider message below, patient to have VV to follow up w/ PCP " 6/15/23. Scheduled. RN advised to contact clinic if needing anything in the meantime. Patient agreed.     Terrence ONOFRE RN 6/13/2023 at 12:10 PM

## 2023-06-13 NOTE — PROGRESS NOTES
[facility]    Background: Transitional Care Management program identified per system criteria and reviewed by [facility] team for possible outreach.    Assessment: Upon chart review, [facility] Team member will not proceed with patient outreach related to this episode of Transitional Care Management program due to reason below:    Patient has active communication with a nurse, provider or care team for reason of post-hospital follow up plan.  Outreach call by [facility] team not indicated to minimize duplicative efforts.      Patient is in active communication today with a Registered Nurse from [facility] for: Hospital/TCU/ED for Chronic Condition Discharge Protocol. No W outreach call needed at this time.    Plan: Transitional Care Management episode addressed appropriately per reason noted above.      CLAIRE Iqbal  [phone]  [facility]    *Connected Care Resource Team does NOT follow patient ongoing. Referrals are identified based on internal discharge reports and the outreach is to ensure patient has an understanding of their discharge instructions.

## 2023-06-13 NOTE — TELEPHONE ENCOUNTER
Per cc'd chart: Feng Doherty MD McFeters, Jaime, RN  Needs hosp f/up. I can overbook 1130 am (earlier arrival) for VV on 6/15. LMK if they can't do this. Want to make sure doing okay at home, has ortho f/up. KMB       Attempted to reach patient, LVMTCB.     Terrence ONOFRE RN 6/13/2023 at 10:34 AM

## 2023-06-15 ENCOUNTER — VIRTUAL VISIT (OUTPATIENT)
Dept: PEDIATRICS | Facility: CLINIC | Age: 78
End: 2023-06-15
Payer: COMMERCIAL

## 2023-06-15 ENCOUNTER — TELEPHONE (OUTPATIENT)
Dept: PEDIATRICS | Facility: CLINIC | Age: 78
End: 2023-06-15

## 2023-06-15 ENCOUNTER — HOSPITAL ENCOUNTER (INPATIENT)
Facility: CLINIC | Age: 78
LOS: 1 days | Discharge: NURSING, REHAB OR LONG-TERM CARE | DRG: 379 | End: 2023-06-17
Attending: EMERGENCY MEDICINE | Admitting: INTERNAL MEDICINE
Payer: COMMERCIAL

## 2023-06-15 DIAGNOSIS — K92.2 UPPER GI BLEEDING: ICD-10-CM

## 2023-06-15 DIAGNOSIS — S32.9XXD CLOSED NONDISPLACED FRACTURE OF PELVIS WITH ROUTINE HEALING, UNSPECIFIED PART OF PELVIS, SUBSEQUENT ENCOUNTER: Primary | ICD-10-CM

## 2023-06-15 DIAGNOSIS — S32.82XD MULTIPLE CLOSED FRACTURES OF PELVIS WITHOUT DISRUPTION OF PELVIC RING WITH ROUTINE HEALING, SUBSEQUENT ENCOUNTER: ICD-10-CM

## 2023-06-15 DIAGNOSIS — K92.1 BLACK STOOLS: ICD-10-CM

## 2023-06-15 DIAGNOSIS — Z09 HOSPITAL DISCHARGE FOLLOW-UP: Primary | ICD-10-CM

## 2023-06-15 LAB
ABO/RH(D): NORMAL
ALBUMIN SERPL-MCNC: 3.3 G/DL (ref 3.5–5)
ALP SERPL-CCNC: 65 U/L (ref 45–120)
ALT SERPL W P-5'-P-CCNC: 28 U/L (ref 0–45)
ANION GAP SERPL CALCULATED.3IONS-SCNC: 8 MMOL/L (ref 5–18)
ANTIBODY SCREEN: NEGATIVE
AST SERPL W P-5'-P-CCNC: 33 U/L (ref 0–40)
BASOPHILS # BLD AUTO: 0 10E3/UL (ref 0–0.2)
BASOPHILS NFR BLD AUTO: 1 %
BILIRUB SERPL-MCNC: 0.5 MG/DL (ref 0–1)
BUN SERPL-MCNC: 36 MG/DL (ref 8–28)
CALCIUM SERPL-MCNC: 9.4 MG/DL (ref 8.5–10.5)
CHLORIDE BLD-SCNC: 101 MMOL/L (ref 98–107)
CO2 SERPL-SCNC: 27 MMOL/L (ref 22–31)
CREAT SERPL-MCNC: 0.82 MG/DL (ref 0.6–1.1)
EOSINOPHIL # BLD AUTO: 0.2 10E3/UL (ref 0–0.7)
EOSINOPHIL NFR BLD AUTO: 3 %
ERYTHROCYTE [DISTWIDTH] IN BLOOD BY AUTOMATED COUNT: 12.7 % (ref 10–15)
GFR SERPL CREATININE-BSD FRML MDRD: 74 ML/MIN/1.73M2
GLUCOSE BLD-MCNC: 149 MG/DL (ref 70–125)
HCT VFR BLD AUTO: 24.6 % (ref 35–47)
HGB BLD-MCNC: 8.2 G/DL (ref 11.7–15.7)
IMM GRANULOCYTES # BLD: 0 10E3/UL
IMM GRANULOCYTES NFR BLD: 0 %
INR PPP: 0.99 (ref 0.85–1.15)
LYMPHOCYTES # BLD AUTO: 1.1 10E3/UL (ref 0.8–5.3)
LYMPHOCYTES NFR BLD AUTO: 16 %
MCH RBC QN AUTO: 32.5 PG (ref 26.5–33)
MCHC RBC AUTO-ENTMCNC: 33.3 G/DL (ref 31.5–36.5)
MCV RBC AUTO: 98 FL (ref 78–100)
MONOCYTES # BLD AUTO: 0.7 10E3/UL (ref 0–1.3)
MONOCYTES NFR BLD AUTO: 11 %
NEUTROPHILS # BLD AUTO: 4.7 10E3/UL (ref 1.6–8.3)
NEUTROPHILS NFR BLD AUTO: 69 %
NRBC # BLD AUTO: 0 10E3/UL
NRBC BLD AUTO-RTO: 0 /100
PLATELET # BLD AUTO: 177 10E3/UL (ref 150–450)
POTASSIUM BLD-SCNC: 4.2 MMOL/L (ref 3.5–5)
PROT SERPL-MCNC: 6.6 G/DL (ref 6–8)
RBC # BLD AUTO: 2.52 10E6/UL (ref 3.8–5.2)
SODIUM SERPL-SCNC: 136 MMOL/L (ref 136–145)
SPECIMEN EXPIRATION DATE: NORMAL
WBC # BLD AUTO: 6.7 10E3/UL (ref 4–11)

## 2023-06-15 PROCEDURE — 96374 THER/PROPH/DIAG INJ IV PUSH: CPT

## 2023-06-15 PROCEDURE — C9113 INJ PANTOPRAZOLE SODIUM, VIA: HCPCS | Performed by: EMERGENCY MEDICINE

## 2023-06-15 PROCEDURE — 85610 PROTHROMBIN TIME: CPT | Performed by: EMERGENCY MEDICINE

## 2023-06-15 PROCEDURE — 99207 PR INPT ADMISSION FROM CLINIC: CPT | Performed by: INTERNAL MEDICINE

## 2023-06-15 PROCEDURE — G0378 HOSPITAL OBSERVATION PER HR: HCPCS

## 2023-06-15 PROCEDURE — 85025 COMPLETE CBC W/AUTO DIFF WBC: CPT | Performed by: EMERGENCY MEDICINE

## 2023-06-15 PROCEDURE — 250N000011 HC RX IP 250 OP 636: Performed by: EMERGENCY MEDICINE

## 2023-06-15 PROCEDURE — 86850 RBC ANTIBODY SCREEN: CPT | Performed by: EMERGENCY MEDICINE

## 2023-06-15 PROCEDURE — 86901 BLOOD TYPING SEROLOGIC RH(D): CPT | Performed by: EMERGENCY MEDICINE

## 2023-06-15 PROCEDURE — 36415 COLL VENOUS BLD VENIPUNCTURE: CPT | Performed by: EMERGENCY MEDICINE

## 2023-06-15 PROCEDURE — 258N000003 HC RX IP 258 OP 636: Performed by: EMERGENCY MEDICINE

## 2023-06-15 PROCEDURE — 80053 COMPREHEN METABOLIC PANEL: CPT | Performed by: EMERGENCY MEDICINE

## 2023-06-15 PROCEDURE — 250N000013 HC RX MED GY IP 250 OP 250 PS 637: Performed by: EMERGENCY MEDICINE

## 2023-06-15 PROCEDURE — 99285 EMERGENCY DEPT VISIT HI MDM: CPT | Mod: 25

## 2023-06-15 PROCEDURE — 96361 HYDRATE IV INFUSION ADD-ON: CPT

## 2023-06-15 PROCEDURE — 99223 1ST HOSP IP/OBS HIGH 75: CPT | Performed by: INTERNAL MEDICINE

## 2023-06-15 RX ORDER — OXYCODONE HYDROCHLORIDE 5 MG/1
2.5-5 TABLET ORAL EVERY 6 HOURS PRN
Qty: 28 TABLET | Refills: 0 | Status: SHIPPED | OUTPATIENT
Start: 2023-06-15 | End: 2023-06-22

## 2023-06-15 RX ORDER — OXYCODONE AND ACETAMINOPHEN 5; 325 MG/1; MG/1
1 TABLET ORAL ONCE
Status: COMPLETED | OUTPATIENT
Start: 2023-06-15 | End: 2023-06-15

## 2023-06-15 RX ADMIN — OXYCODONE AND ACETAMINOPHEN 1 TABLET: 5; 325 TABLET ORAL at 22:52

## 2023-06-15 RX ADMIN — PANTOPRAZOLE SODIUM 40 MG: 40 INJECTION, POWDER, FOR SOLUTION INTRAVENOUS at 21:26

## 2023-06-15 RX ADMIN — SODIUM CHLORIDE 1000 ML: 9 INJECTION, SOLUTION INTRAVENOUS at 21:34

## 2023-06-15 ASSESSMENT — PATIENT HEALTH QUESTIONNAIRE - PHQ9
SUM OF ALL RESPONSES TO PHQ QUESTIONS 1-9: 0
SUM OF ALL RESPONSES TO PHQ QUESTIONS 1-9: 0
10. IF YOU CHECKED OFF ANY PROBLEMS, HOW DIFFICULT HAVE THESE PROBLEMS MADE IT FOR YOU TO DO YOUR WORK, TAKE CARE OF THINGS AT HOME, OR GET ALONG WITH OTHER PEOPLE: NOT DIFFICULT AT ALL

## 2023-06-15 ASSESSMENT — ACTIVITIES OF DAILY LIVING (ADL)
ADLS_ACUITY_SCORE: 33
ADLS_ACUITY_SCORE: 35

## 2023-06-15 NOTE — TELEPHONE ENCOUNTER
I appreciate the idea but I don't think the stool test will add much - even if negative I still feel like we need to do the w/up.     Let's see what her hgb is.     FRAN Doherty MD  Internal Medicine-Pediatrics

## 2023-06-15 NOTE — PROGRESS NOTES
"Danny is a 77 year old who is being evaluated via a billable telephone visit.      Distant Location (provider location):  On-site    Assessment & Plan       ICD-10-CM    1. Hospital discharge follow-up  Z09       2. Multiple closed fractures of pelvis without disruption of pelvic ring with routine healing, subsequent encounter  S32.82XD       3. Black stools  K92.1         Add in oxycodone for pain.   Unclear if/how black stools related.   F/up depends on labs.     --------------------------  PATIENT INSTRUCTIONS    Patient Instructions   Good to talk today.     I'll have on of my nurses look into getting you scheduled with Ortho ([place]) - they wanted to see you in 2 weeks.     For pain  - keep the tylenol every 6 hours  - can add 2.5-5mg of oxycodone every 6 hours as needed. Only use this if needed. Can cause constipation so watch for that (we can add in senna if needed). Would have someone with you the first time you take it just in case.     For the black stools  - I'm not sure where this is coming from - usually I think of an ulcer or upper GI bleed.  - Hold the aspirin until I tell you to restart it.  - no motrin/ibuprofen  - getting labs today - follow up plan will depend on these results.    - if lots of black stools, lightheadedness -> ED        --------------------------             MED REC REQUIRED  Post Medication Reconciliation Status: discharge medications reconciled and changed, per note/orders  BMI:   Estimated body mass index is 25.65 kg/m  as calculated from the following:    Height as of 6/10/23: 1.562 m (5' 1.5\").    Weight as of 6/10/23: 62.6 kg (138 lb).           Feng Doherty MD  [facility]    Amado Delgado is a 77 year old, presenting for the following health issues:  No chief complaint on file.         View : No data to display.              [facility]       Hospital Follow-up Visit:    Hospital/Nursing Home/ Rehab Facility: [facility]  Date of Admission: 6/10  Date of Discharge: 6/12  Reason(s) for Admission: Pelvic fractures.     Was your hospitalization related to COVID-19? No   Problems taking medications regularly:  None  Medication changes since discharge: None  Problems adhering to non-medication therapy:  None    Summary of hospitalization:  [facility] discharge summary reviewed  Diagnostic Tests/Treatments reviewed.  Follow up needed: Ortho  Other Healthcare Providers Involved in Patient s Care:         Homecare  Update since discharge: worsened. Was walking her dog and got pulled and fell onto her hip -> pelvic fractures.     Yesterday afternoon wasn't feeling that good and a bm and it was black but firm. Was getting lightheaded when she was cleaning the bathroom, walked into the bedroom and she vomited and it was dark vomit (she had had blueberries during the day with normal food)    In the hospital she had two episodes  - got IV oxycodone -> got lightheaded and threw up  - walked to the bathroom -> got lightheaded and threw up    Before hospital no black stools  Stooled once in the hospital and it was normal.   Stooling more than usual - typically goes twice a day.   Ever since yesterday afternoon the stools she's had have been black.   Ate a little bit today.   Feeling worse now than she did in the hospital.   No upper abdominal pain  Hasn't eaten much   No pain when eating  Not liquid  No recent antibiotics    # Hip  - not sure if walking too much  - taking tylenol every 8 hours  - by about 6 hours it gets bad  - called former  and he said every 6    Kids are covering this week  [place] from [place] [facility] - they haven't connected yet.     Plan of care communicated with patient                 Review of Systems   Constitutional, HEENT, cardiovascular, pulmonary, gi and gu systems are negative, except as otherwise noted.      Objective           Vitals:  No vitals were obtained today due  to virtual visit.    Physical Exam   healthy, alert and no distress  PSYCH: Alert and oriented times 3; coherent speech, normal   rate and volume, able to articulate logical thoughts, able   to abstract reason, no tangential thoughts, no hallucinations   or delusions  Her affect is normal  RESP: No cough, no audible wheezing, able to talk in full sentences  Remainder of exam unable to be completed due to telephone visits        Phone call duration: 17 minutes

## 2023-06-15 NOTE — Clinical Note
Call pt and update that she can do walk in labs at [facility] today. Can we figure out who/when she should see ortho? I don't see a note. She said it was Charles that consulted. Thanks!

## 2023-06-15 NOTE — PATIENT INSTRUCTIONS
Good to talk today.     I'll have on of my nurses look into getting you scheduled with Ortho ([facility]) - they wanted to see you in 2 weeks.     For pain  - keep the tylenol every 6 hours  - can add 2.5-5mg of oxycodone every 6 hours as needed. Only use this if needed. Can cause constipation so watch for that (we can add in senna if needed). Would have someone with you the first time you take it just in case.     For the black stools  - I'm not sure where this is coming from - usually I think of an ulcer or upper GI bleed.  - Hold the aspirin until I tell you to restart it.  - no motrin/ibuprofen  - getting labs today - follow up plan will depend on these results.    - if lots of black stools, lightheadedness -> ED

## 2023-06-15 NOTE — TELEPHONE ENCOUNTER
Called and spoke with patient. RN relayed provider message below. Patient agreed with plan. Patient has not felt well enough to get out of house to do lab work yet, but is planning to do so as soon as possible.     Terrence ONOFRE RN 6/15/2023 at 4:16 PM

## 2023-06-15 NOTE — TELEPHONE ENCOUNTER
Received call from patient, states her former  is a doctor and was wondering if Dr. Doherty would consider adding a stool test to look for blood in stool. Per patient she mentioned her hgb may be low given her recent injury regardless, is wondering if stool testing should be done.     Terrence ONOFRE RN 6/15/2023 at 12:40 PM

## 2023-06-15 NOTE — PROGRESS NOTES
Call placed to pt  Relayed  Message to pt regarding labs and scheduling number for [facility]  Referral was placed to  ortho but per pt [facility] came to see her in the hospital    Pt verbalized understanding and agrees to the plan    Thank you  Yaakov Wilson RN on 6/15/2023 at 12:17 PM

## 2023-06-16 ENCOUNTER — APPOINTMENT (OUTPATIENT)
Dept: PHYSICAL THERAPY | Facility: CLINIC | Age: 78
DRG: 379 | End: 2023-06-16
Attending: EMERGENCY MEDICINE
Payer: COMMERCIAL

## 2023-06-16 ENCOUNTER — APPOINTMENT (OUTPATIENT)
Dept: ULTRASOUND IMAGING | Facility: CLINIC | Age: 78
DRG: 379 | End: 2023-06-16
Attending: INTERNAL MEDICINE
Payer: COMMERCIAL

## 2023-06-16 ENCOUNTER — ANESTHESIA EVENT (OUTPATIENT)
Dept: SURGERY | Facility: CLINIC | Age: 78
DRG: 379 | End: 2023-06-16
Payer: COMMERCIAL

## 2023-06-16 ENCOUNTER — ANESTHESIA (OUTPATIENT)
Dept: SURGERY | Facility: CLINIC | Age: 78
DRG: 379 | End: 2023-06-16
Payer: COMMERCIAL

## 2023-06-16 PROBLEM — K92.2 UPPER GI BLEEDING: Status: ACTIVE | Noted: 2023-06-16

## 2023-06-16 PROBLEM — K25.4 CHRONIC GASTRIC ULCER WITH HEMORRHAGE: Status: ACTIVE | Noted: 2023-06-15

## 2023-06-16 LAB
ALBUMIN SERPL-MCNC: 2.8 G/DL (ref 3.5–5)
ALP SERPL-CCNC: 54 U/L (ref 45–120)
ALT SERPL W P-5'-P-CCNC: 22 U/L (ref 0–45)
ANION GAP SERPL CALCULATED.3IONS-SCNC: 6 MMOL/L (ref 5–18)
AST SERPL W P-5'-P-CCNC: 25 U/L (ref 0–40)
BASOPHILS # BLD AUTO: 0 10E3/UL (ref 0–0.2)
BASOPHILS NFR BLD AUTO: 1 %
BILIRUB SERPL-MCNC: 0.4 MG/DL (ref 0–1)
BUN SERPL-MCNC: 25 MG/DL (ref 8–28)
CALCIUM SERPL-MCNC: 8.5 MG/DL (ref 8.5–10.5)
CHLORIDE BLD-SCNC: 108 MMOL/L (ref 98–107)
CO2 SERPL-SCNC: 25 MMOL/L (ref 22–31)
CREAT SERPL-MCNC: 0.72 MG/DL (ref 0.6–1.1)
EOSINOPHIL # BLD AUTO: 0.3 10E3/UL (ref 0–0.7)
EOSINOPHIL NFR BLD AUTO: 6 %
ERYTHROCYTE [DISTWIDTH] IN BLOOD BY AUTOMATED COUNT: 12.7 % (ref 10–15)
GFR SERPL CREATININE-BSD FRML MDRD: 86 ML/MIN/1.73M2
GLUCOSE BLD-MCNC: 97 MG/DL (ref 70–125)
HCT VFR BLD AUTO: 23.6 % (ref 35–47)
HGB BLD-MCNC: 7.8 G/DL (ref 11.7–15.7)
HGB BLD-MCNC: 7.9 G/DL (ref 11.7–15.7)
HGB BLD-MCNC: 8.2 G/DL (ref 11.7–15.7)
IMM GRANULOCYTES # BLD: 0 10E3/UL
IMM GRANULOCYTES NFR BLD: 0 %
LYMPHOCYTES # BLD AUTO: 1.2 10E3/UL (ref 0.8–5.3)
LYMPHOCYTES NFR BLD AUTO: 24 %
MCH RBC QN AUTO: 32.2 PG (ref 26.5–33)
MCHC RBC AUTO-ENTMCNC: 33.1 G/DL (ref 31.5–36.5)
MCV RBC AUTO: 98 FL (ref 78–100)
MONOCYTES # BLD AUTO: 0.6 10E3/UL (ref 0–1.3)
MONOCYTES NFR BLD AUTO: 12 %
NEUTROPHILS # BLD AUTO: 2.9 10E3/UL (ref 1.6–8.3)
NEUTROPHILS NFR BLD AUTO: 57 %
NRBC # BLD AUTO: 0 10E3/UL
NRBC BLD AUTO-RTO: 0 /100
PLATELET # BLD AUTO: 171 10E3/UL (ref 150–450)
POTASSIUM BLD-SCNC: 4 MMOL/L (ref 3.5–5)
PROT SERPL-MCNC: 5.4 G/DL (ref 6–8)
RBC # BLD AUTO: 2.42 10E6/UL (ref 3.8–5.2)
SODIUM SERPL-SCNC: 139 MMOL/L (ref 136–145)
UPPER GI ENDOSCOPY: NORMAL
WBC # BLD AUTO: 5.1 10E3/UL (ref 4–11)

## 2023-06-16 PROCEDURE — 85025 COMPLETE CBC W/AUTO DIFF WBC: CPT | Performed by: INTERNAL MEDICINE

## 2023-06-16 PROCEDURE — 250N000013 HC RX MED GY IP 250 OP 250 PS 637: Performed by: EMERGENCY MEDICINE

## 2023-06-16 PROCEDURE — 258N000003 HC RX IP 258 OP 636: Performed by: NURSE ANESTHETIST, CERTIFIED REGISTERED

## 2023-06-16 PROCEDURE — 360N000075 HC SURGERY LEVEL 2, PER MIN: Performed by: INTERNAL MEDICINE

## 2023-06-16 PROCEDURE — 99232 SBSQ HOSP IP/OBS MODERATE 35: CPT | Performed by: EMERGENCY MEDICINE

## 2023-06-16 PROCEDURE — 97530 THERAPEUTIC ACTIVITIES: CPT | Mod: GP

## 2023-06-16 PROCEDURE — 370N000017 HC ANESTHESIA TECHNICAL FEE, PER MIN: Performed by: INTERNAL MEDICINE

## 2023-06-16 PROCEDURE — 120N000001 HC R&B MED SURG/OB

## 2023-06-16 PROCEDURE — 85018 HEMOGLOBIN: CPT | Performed by: EMERGENCY MEDICINE

## 2023-06-16 PROCEDURE — 36415 COLL VENOUS BLD VENIPUNCTURE: CPT | Performed by: EMERGENCY MEDICINE

## 2023-06-16 PROCEDURE — G0378 HOSPITAL OBSERVATION PER HR: HCPCS

## 2023-06-16 PROCEDURE — C9113 INJ PANTOPRAZOLE SODIUM, VIA: HCPCS | Performed by: INTERNAL MEDICINE

## 2023-06-16 PROCEDURE — 258N000003 HC RX IP 258 OP 636: Performed by: INTERNAL MEDICINE

## 2023-06-16 PROCEDURE — 80053 COMPREHEN METABOLIC PANEL: CPT | Performed by: INTERNAL MEDICINE

## 2023-06-16 PROCEDURE — 999N000141 HC STATISTIC PRE-PROCEDURE NURSING ASSESSMENT: Performed by: INTERNAL MEDICINE

## 2023-06-16 PROCEDURE — 710N000012 HC RECOVERY PHASE 2, PER MINUTE: Performed by: INTERNAL MEDICINE

## 2023-06-16 PROCEDURE — 97161 PT EVAL LOW COMPLEX 20 MIN: CPT | Mod: GP

## 2023-06-16 PROCEDURE — 0DB68ZX EXCISION OF STOMACH, VIA NATURAL OR ARTIFICIAL OPENING ENDOSCOPIC, DIAGNOSTIC: ICD-10-PCS | Performed by: INTERNAL MEDICINE

## 2023-06-16 PROCEDURE — 250N000011 HC RX IP 250 OP 636: Performed by: INTERNAL MEDICINE

## 2023-06-16 PROCEDURE — 36415 COLL VENOUS BLD VENIPUNCTURE: CPT | Performed by: INTERNAL MEDICINE

## 2023-06-16 PROCEDURE — 96376 TX/PRO/DX INJ SAME DRUG ADON: CPT

## 2023-06-16 PROCEDURE — 93970 EXTREMITY STUDY: CPT

## 2023-06-16 PROCEDURE — 250N000011 HC RX IP 250 OP 636: Performed by: NURSE ANESTHETIST, CERTIFIED REGISTERED

## 2023-06-16 PROCEDURE — 250N000013 HC RX MED GY IP 250 OP 250 PS 637: Performed by: INTERNAL MEDICINE

## 2023-06-16 PROCEDURE — 88305 TISSUE EXAM BY PATHOLOGIST: CPT | Mod: TC | Performed by: INTERNAL MEDICINE

## 2023-06-16 PROCEDURE — 272N000001 HC OR GENERAL SUPPLY STERILE: Performed by: INTERNAL MEDICINE

## 2023-06-16 RX ORDER — ONDANSETRON 2 MG/ML
4 INJECTION INTRAMUSCULAR; INTRAVENOUS EVERY 30 MIN PRN
Status: DISCONTINUED | OUTPATIENT
Start: 2023-06-16 | End: 2023-06-16 | Stop reason: HOSPADM

## 2023-06-16 RX ORDER — VALACYCLOVIR HYDROCHLORIDE 500 MG/1
500 TABLET, FILM COATED ORAL 2 TIMES DAILY PRN
COMMUNITY
End: 2024-01-14

## 2023-06-16 RX ORDER — SODIUM CHLORIDE 9 MG/ML
INJECTION, SOLUTION INTRAVENOUS CONTINUOUS
Status: DISCONTINUED | OUTPATIENT
Start: 2023-06-16 | End: 2023-06-17

## 2023-06-16 RX ORDER — LIDOCAINE 40 MG/G
CREAM TOPICAL
Status: DISCONTINUED | OUTPATIENT
Start: 2023-06-16 | End: 2023-06-16 | Stop reason: HOSPADM

## 2023-06-16 RX ORDER — OXYCODONE HYDROCHLORIDE 5 MG/1
10 TABLET ORAL
Status: DISCONTINUED | OUTPATIENT
Start: 2023-06-16 | End: 2023-06-16 | Stop reason: HOSPADM

## 2023-06-16 RX ORDER — LEVOTHYROXINE SODIUM 75 UG/1
75 TABLET ORAL DAILY
Status: DISCONTINUED | OUTPATIENT
Start: 2023-06-16 | End: 2023-06-17 | Stop reason: HOSPADM

## 2023-06-16 RX ORDER — VITAMIN B COMPLEX
25 TABLET ORAL AT BEDTIME
Status: DISCONTINUED | OUTPATIENT
Start: 2023-06-16 | End: 2023-06-17 | Stop reason: HOSPADM

## 2023-06-16 RX ORDER — ONDANSETRON 2 MG/ML
4 INJECTION INTRAMUSCULAR; INTRAVENOUS EVERY 6 HOURS PRN
Status: DISCONTINUED | OUTPATIENT
Start: 2023-06-16 | End: 2023-06-17 | Stop reason: HOSPADM

## 2023-06-16 RX ORDER — ONDANSETRON 2 MG/ML
INJECTION INTRAMUSCULAR; INTRAVENOUS PRN
Status: DISCONTINUED | OUTPATIENT
Start: 2023-06-16 | End: 2023-06-16

## 2023-06-16 RX ORDER — ACETAMINOPHEN 650 MG/1
650 SUPPOSITORY RECTAL EVERY 6 HOURS PRN
Status: DISCONTINUED | OUTPATIENT
Start: 2023-06-16 | End: 2023-06-17 | Stop reason: HOSPADM

## 2023-06-16 RX ORDER — SODIUM CHLORIDE, SODIUM LACTATE, POTASSIUM CHLORIDE, CALCIUM CHLORIDE 600; 310; 30; 20 MG/100ML; MG/100ML; MG/100ML; MG/100ML
INJECTION, SOLUTION INTRAVENOUS CONTINUOUS PRN
Status: DISCONTINUED | OUTPATIENT
Start: 2023-06-16 | End: 2023-06-16

## 2023-06-16 RX ORDER — PROPOFOL 10 MG/ML
INJECTION, EMULSION INTRAVENOUS PRN
Status: DISCONTINUED | OUTPATIENT
Start: 2023-06-16 | End: 2023-06-16

## 2023-06-16 RX ORDER — ONDANSETRON 4 MG/1
4 TABLET, ORALLY DISINTEGRATING ORAL EVERY 30 MIN PRN
Status: DISCONTINUED | OUTPATIENT
Start: 2023-06-16 | End: 2023-06-16 | Stop reason: HOSPADM

## 2023-06-16 RX ORDER — ONDANSETRON 4 MG/1
4 TABLET, ORALLY DISINTEGRATING ORAL EVERY 6 HOURS PRN
Status: DISCONTINUED | OUTPATIENT
Start: 2023-06-16 | End: 2023-06-17 | Stop reason: HOSPADM

## 2023-06-16 RX ORDER — SODIUM CHLORIDE, SODIUM LACTATE, POTASSIUM CHLORIDE, CALCIUM CHLORIDE 600; 310; 30; 20 MG/100ML; MG/100ML; MG/100ML; MG/100ML
INJECTION, SOLUTION INTRAVENOUS CONTINUOUS
Status: DISCONTINUED | OUTPATIENT
Start: 2023-06-16 | End: 2023-06-16 | Stop reason: HOSPADM

## 2023-06-16 RX ORDER — SIMVASTATIN 40 MG
40 TABLET ORAL AT BEDTIME
Status: DISCONTINUED | OUTPATIENT
Start: 2023-06-16 | End: 2023-06-17 | Stop reason: HOSPADM

## 2023-06-16 RX ORDER — ACETAMINOPHEN 325 MG/1
650 TABLET ORAL EVERY 4 HOURS PRN
Status: DISCONTINUED | OUTPATIENT
Start: 2023-06-16 | End: 2023-06-17 | Stop reason: HOSPADM

## 2023-06-16 RX ORDER — OXYCODONE HYDROCHLORIDE 5 MG/1
5 TABLET ORAL
Status: DISCONTINUED | OUTPATIENT
Start: 2023-06-16 | End: 2023-06-16 | Stop reason: HOSPADM

## 2023-06-16 RX ADMIN — ACETAMINOPHEN 650 MG: 325 TABLET ORAL at 18:44

## 2023-06-16 RX ADMIN — SERTRALINE HYDROCHLORIDE 75 MG: 25 TABLET ORAL at 14:36

## 2023-06-16 RX ADMIN — PROPOFOL 50 MG: 10 INJECTION, EMULSION INTRAVENOUS at 11:04

## 2023-06-16 RX ADMIN — ONDANSETRON 4 MG: 2 INJECTION INTRAMUSCULAR; INTRAVENOUS at 11:05

## 2023-06-16 RX ADMIN — OXYCODONE HYDROCHLORIDE 2.5 MG: 5 TABLET ORAL at 07:52

## 2023-06-16 RX ADMIN — SIMVASTATIN 40 MG: 40 TABLET, FILM COATED ORAL at 20:43

## 2023-06-16 RX ADMIN — SODIUM CHLORIDE: 9 INJECTION, SOLUTION INTRAVENOUS at 01:41

## 2023-06-16 RX ADMIN — PANTOPRAZOLE SODIUM 40 MG: 40 INJECTION, POWDER, FOR SOLUTION INTRAVENOUS at 10:03

## 2023-06-16 RX ADMIN — SODIUM CHLORIDE, POTASSIUM CHLORIDE, SODIUM LACTATE AND CALCIUM CHLORIDE: 600; 310; 30; 20 INJECTION, SOLUTION INTRAVENOUS at 11:00

## 2023-06-16 RX ADMIN — OXYCODONE HYDROCHLORIDE 2.5 MG: 5 TABLET ORAL at 19:34

## 2023-06-16 RX ADMIN — PHENYLEPHRINE HYDROCHLORIDE 100 MCG: 10 INJECTION INTRAVENOUS at 11:14

## 2023-06-16 RX ADMIN — Medication 25 MCG: at 20:43

## 2023-06-16 RX ADMIN — OXYCODONE HYDROCHLORIDE 2.5 MG: 5 TABLET ORAL at 13:31

## 2023-06-16 RX ADMIN — LEVOTHYROXINE SODIUM 75 MCG: 0.07 TABLET ORAL at 14:36

## 2023-06-16 ASSESSMENT — ACTIVITIES OF DAILY LIVING (ADL)
ADLS_ACUITY_SCORE: 37
ADLS_ACUITY_SCORE: 35
ADLS_ACUITY_SCORE: 37
ADLS_ACUITY_SCORE: 35
DEPENDENT_IADLS:: INDEPENDENT
ADLS_ACUITY_SCORE: 33
ADLS_ACUITY_SCORE: 33
ADLS_ACUITY_SCORE: 37
ADLS_ACUITY_SCORE: 33

## 2023-06-16 NOTE — PROGRESS NOTES
06/16/23 1500   Appointment Info   Signing Clinician's Name / Credentials (PT) Nito Kirkpatrick, PT, DPT   Living Environment   People in Home alone   Current Living Arrangements house   Home Accessibility stairs to enter home   Number of Stairs, Main Entrance 2   Self-Care   Usual Activity Tolerance good   Current Activity Tolerance moderate   Equipment Currently Used at Home walker, rolling   Fall history within last six months yes   Number of times patient has fallen within last six months 2  (Falls while walking dog)   General Information   Onset of Illness/Injury or Date of Surgery 06/15/23   Referring Physician Dr. Bloom   Patient/Family Therapy Goals Statement (PT) Improve overall mobility   Pertinent History of Current Problem (include personal factors and/or comorbidities that impact the POC) Upper GI bleeding   Existing Precautions/Restrictions weight bearing   Weight-Bearing Status - LLE weight-bearing as tolerated   Weight-Bearing Status - RLE full weight-bearing   Range of Motion (ROM)   ROM Comment WFL   Strength (Manual Muscle Testing)   Strength Comments WFL   Bed Mobility   Bed Mobility supine-sit;sit-supine   Supine-Sit [place] (Bed Mobility) modified independence   Sit-Supine [place] (Bed Mobility) modified independence   Assistive Device (Bed Mobility) bed rails   Transfers   Transfers sit-stand transfer   Sit-Stand Transfer   Sit-Stand [place] (Transfers) supervision   Assistive Device (Sit-Stand Transfers) walker, front-wheeled   Gait/Stairs (Locomotion)   [place] Level (Gait) supervision   Assistive Device (Gait) walker, front-wheeled   Distance in Feet 10'   Distance in Feet (Gait) 50'   Pattern (Gait) step-to   Deviations/Abnormal Patterns (Gait) jackie decreased;stride length decreased   Clinical Impression   Criteria for Skilled Therapeutic Intervention Yes, treatment indicated   PT Diagnosis (PT) Impaired functional mobility   Influenced by the following impairments  Weakness, pain   Functional limitations due to impairments Transfers, gait, stairs   Clinical Presentation (PT Evaluation Complexity) Stable/Uncomplicated   Clinical Presentation Rationale Patient presents as medically diagnosed.   Clinical Decision Making (Complexity) low complexity   Planned Therapy Interventions (PT) gait training;home exercise program;patient/family education;stair training;strengthening;transfer training;home program guidelines   Anticipated Equipment Needs at Discharge (PT) walker, rolling   Risk & Benefits of therapy have been explained care plan/treatment goals reviewed;patient   PT Total Evaluation Time   PT Eval, Low Complexity Minutes (56216) 10   Plan of Care Review   Plan of Care Reviewed With patient   Physical Therapy Goals   PT Frequency Daily   PT Predicted Duration/Target Date for Goal Attainment 06/19/23   PT Goals Transfers;Gait;Stairs   PT: Transfers Modified independent;Sit to/from stand;Assistive device   PT: Gait Assistive device;150 feet;Supervision/stand-by assist   PT: Stairs 2 stairs;Supervision/stand-by assist;Rail on both sides   Interventions   Interventions Quick Adds Gait Training;Therapeutic Activity   Therapeutic Activity   Therapeutic Activities: dynamic activities to improve functional performance Minutes (74740) 10   Symptoms Noted During/After Treatment None   Treatment Detail/Skilled Intervention Supine to sit Mod I with HOB elevated and use of bed rail, increased time for set up, SBA at EOB. Sit<>stand x2 from bed and toilet, independent with toileting, washing hands at sink. Sit to supine Mod I with HOB elevated.   Gait Training   Gait Training Minutes (44750) 5   Symptoms Noted During/After Treatment (Gait Training) none   Treatment Detail/Skilled Intervention Patient amb 50' total with supervision and FWW. Cues for navigation and safety with turns as PT managed IV pole. Energy is returning but not back to baseline.   [place] Level (Gait Training)  stand-by assist   Physical Assistance Level (Gait Training) supervision;verbal cues   Weight Bearing (Gait Training) full weight-bearing   Assistive Device (Gait Training) rolling walker   Pattern Analysis (Gait Training) swing-to gait   Gait Analysis Deviations decreased jackie;decreased step length   Impairments (Gait Analysis/Training) pain   PT Discharge Planning   PT Plan Increase activity tolerance, stairs if going home   PT Discharge Recommendation (DC Rec) (S)  Transitional Care Facility   PT Rationale for DC Rec Decreased activity tolerance and below baseline, lives alone and has had falls with dog, ambulating well short distances and no help needed with transfers.   PT Brief overview of current status SBA for transfers and short distance amb with FWW

## 2023-06-16 NOTE — ANESTHESIA PREPROCEDURE EVALUATION
Anesthesia Pre-Procedure Evaluation    Patient: Rios Michael   MRN: [id number] : 1945        Procedure : Procedure(s):  ESOPHAGOGASTRODUODENOSCOPY          Past Medical History:   Diagnosis Date     Arthritis      Breast cyst [id number]     Coronary artery disease      Depression      Disease of thyroid gland      Family history of clotting disorder      Herpes simplex virus (HSV) infection     Created by Conversion  Replacement Utility updated for latest IMO load     High cholesterol      Hypertension      Status post hip replacement, right 2019      Past Surgical History:   Procedure Laterality Date     BREAST CYST EXCISION  [id number]    Removed from [facility]     CARDIAC CATHETERIZATION       CORONARY STENT PLACEMENT       AR PUNC/ASPIR BREAST CYST      Description: Breast Surgery Puncture Aspiration Of Cyst;  Recorded: 2008;     ZZC TOTAL HIP ARTHROPLASTY Right 2019    Procedure: RIGHT DIRECT ANTERIOR TOTAL HIP ARTHROPLASTY;  Surgeon: Collin Perez MD;  Location: [facility];  Service: Orthopedics     [facility] CORONARY STENT PERCUT, INITIAL VESSEL      Description: Cath Stent Placement;  Recorded: 2012;  Comments: ADRIANA to mid RCA      Allergies   Allergen Reactions     Estradiol Hives     Evista [Raloxifene] Hives      Social History     Tobacco Use     Smoking status: Never     Smokeless tobacco: Never   Vaping Use     Vaping status: Not on file   Substance Use Topics     Alcohol use: Yes     Alcohol/week: 5.0 standard drinks of alcohol      Wt Readings from Last 1 Encounters:   06/15/23 62.6 kg (138 lb)        Anesthesia Evaluation   Pt has had prior anesthetic.     No history of anesthetic complications       ROS/MED HX  ENT/Pulmonary:  - neg pulmonary ROS     Neurologic:  - neg neurologic ROS     Cardiovascular:  - neg cardiovascular ROS   (+) hypertension--CAD --stent-    METS/Exercise Tolerance: >4 METS    Hematologic:  - neg hematologic  ROS     Musculoskeletal:   - neg musculoskeletal ROS     GI/Hepatic:  - neg GI/hepatic ROS     Renal/Genitourinary:  - neg Renal ROS     Endo:  - neg endo ROS   (+) thyroid problem,     Psychiatric/Substance Use:  - neg psychiatric ROS     Infectious Disease:  - neg infectious disease ROS     Malignancy:  - neg malignancy ROS     Other:  - neg other ROS          Physical Exam    Airway  airway exam normal      Mallampati: II    Neck ROM: full   Mouth opening: > 3 cm    Respiratory Devices and Support         Dental    unable to assess    (+) Minor Abnormalities - some fillings, tiny chips      Cardiovascular   cardiovascular exam normal       Rhythm and rate: regular and normal     Pulmonary   pulmonary exam normal        breath sounds clear to auscultation           OUTSIDE LABS:  CBC:   Lab Results   Component Value Date    WBC 5.1 06/16/2023    WBC 6.7 06/15/2023    HGB 7.8 (L) 06/16/2023    HGB 8.2 (L) 06/15/2023    HCT 23.6 (L) 06/16/2023    HCT 24.6 (L) 06/15/2023     06/16/2023     06/15/2023     BMP:   Lab Results   Component Value Date     06/16/2023     06/15/2023    POTASSIUM 4.0 06/16/2023    POTASSIUM 4.2 06/15/2023    CHLORIDE 108 (H) 06/16/2023    CHLORIDE 101 06/15/2023    CO2 25 06/16/2023    CO2 27 06/15/2023    BUN 25 06/16/2023    BUN 36 (H) 06/15/2023    CR 0.72 06/16/2023    CR 0.82 06/15/2023    GLC 97 06/16/2023     (H) 06/15/2023     COAGS:   Lab Results   Component Value Date    INR 0.99 06/15/2023     POC: No results found for: BGM, HCG, HCGS  HEPATIC:   Lab Results   Component Value Date    ALBUMIN 2.8 (L) 06/16/2023    PROTTOTAL 5.4 (L) 06/16/2023    ALT 22 06/16/2023    AST 25 06/16/2023    ALKPHOS 54 06/16/2023    BILITOTAL 0.4 06/16/2023     OTHER:   Lab Results   Component Value Date    A1C 5.9 08/01/2013    ALEX 8.5 06/16/2023    TSH 1.35 01/05/2023       Anesthesia Plan    ASA Status:  3      Anesthesia Type: MAC.   Induction: Propofol.           Consents    Anesthesia Plan(s)  and associated risks, benefits, and realistic alternatives discussed. Questions answered and patient/representative(s) expressed understanding.    - Discussed:     - Discussed with:  Patient      - Extended Intubation/Ventilatory Support Discussed: No.      - Patient is DNR/DNI Status: No    Use of blood products discussed: Yes.     - Discussed with: Patient.     - Consented: consented to blood products            Reason for refusal: other.     Postoperative Care    Pain management: Multi-modal analgesia.   PONV prophylaxis: Ondansetron (or other 5HT-3), Dexamethasone or Solumedrol     Comments:    Other Comments: Recent pelvic fracture.             Rene Anderson MD

## 2023-06-16 NOTE — H&P
[facility] MEDICINE ADMISSION HISTORY AND PHYSICAL       Assessment & Plan      1. Consider UGIB - Presenting as melena with Hgb of 8+ -- but last Hgb was 2021.    - IVF, NPO, anti emetics, PPI  - GI evaluation  - If Hgb less than 7 - consider PRBC vs iron    2. History of CAD on ASA --   Coronary artery disease: Status post PCI of RCA in 2012.   - hold ASA     3.  Dyslipidemia and HTN  - not on meds    4. Non-operatively treated recent Pelvic fracture ---- CT that time - no mention of bleed.   LC1 pelvic ring fracture   Left inferior pubic ramus fracture  Left sacral ala fracture  Left puboacetabular fracture    - pain control       VTE prophylaxis: SCDs  Diet:  NPO for now, given potential surgery or procedure  Code Status: Full,  COVID vaccination: yes  Barriers to discharge: admitting clinical condition  Discharge Disposition and goals:  Unable to determine at this point, pending clinical progress and response to treatment. Patient may need transfer to SNF or [facility] if unsafe to go home and needed treatment inappropriate at home setting OR may need home health care evaluation if care can be delivered at home settings. Consider referral to care manager/    PPE - I was wearing PPE when I met the patient including but not limited to - N95 mask, Gloves, and/or Safety glasses.      Care plan was created based on available information and patient's condition at the time of encounter. This was discussed with the patient and/or family members using layman's terms, including counseling/education and they have agreed to proceed. I recommend to revise care plan and to review history if there is change in condition and/or new clinical information that is not available during my encounter. At the end of night shift, this case will be presented to the AM Hospitalist.         75 minutes spent by me on the date of service doing chart review, history, exam, diagnostic test results  interpretation, documentation & further activities per the note.      Andrae Rowan MD, MPH, FACP, [facility]  Internal Medicine - Hospitalist        Chief Complaint Black stools      HISTORY     - I met her in ED-8. She is here because of black stools.   - She was just discharged a few days ago for pelvic fracture, non surgically treated.   - She is here for mid abdominal pain and black stools for 2 days. Feels tired and lightheaded.    - Occl ETOH and no NSAIDs. Chronic ASA for CAD    - In the ED, Hgb is 8. Was given IV PPI and fluids.     - ROS --- No headache.  No CP or SOB. No palpitations. No nausea or vomiting. No urinary symptoms. No bleeding symptoms. No weight loss. Rest of 12 point ROS was reviewed and negative.       Past Medical History     Past Medical History:   Diagnosis Date     Arthritis      Breast cyst [id number]     Coronary artery disease      Depression      Disease of thyroid gland      Family history of clotting disorder      Herpes simplex virus (HSV) infection     Created by Conversion  Replacement Utility updated for latest IMO load     High cholesterol      Hypertension      Status post hip replacement, right 11/12/2019         Surgical History     Past Surgical History:   Procedure Laterality Date     BREAST CYST EXCISION  [id number]    Removed from [facility]     CARDIAC CATHETERIZATION       CORONARY STENT PLACEMENT       MI PUNC/ASPIR BREAST CYST      Description: Breast Surgery Puncture Aspiration Of Cyst;  Recorded: 07/29/2008;     [facility] TOTAL HIP ARTHROPLASTY Right 11/12/2019    Procedure: RIGHT DIRECT ANTERIOR TOTAL HIP ARTHROPLASTY;  Surgeon: Collin Perez MD;  Location: [facility];  Service: Orthopedics     [facility] CORONARY STENT PERCUT, INITIAL VESSEL      Description: Cath Stent Placement;  Recorded: 06/11/2012;  Comments: ADRIANA to mid RCA        Family History      Family History   Problem Relation Age of Onset     Osteoporosis Mother      Dementia Mother      Coronary  Artery Disease Father         in 80s     Alzheimer Disease Father      Clotting Disorder Brother         PE at age 64     Other - See Comments Brother         Dementia? Heart issues     Coronary Artery Disease Brother         bypass     Diabetes Type 2  Brother      Pulmonary Embolism Son      Diabetes No family hx of          Social History      .  Social History     Socioeconomic History     Marital status: Single     Spouse name: Not on file     Number of children: 4     Years of education: Not on file     Highest education level: Not on file   Occupational History     Not on file   Tobacco Use     Smoking status: Never     Smokeless tobacco: Never   Vaping Use     Vaping status: Not on file   Substance and Sexual Activity     Alcohol use: Yes     Alcohol/week: 5.0 standard drinks of alcohol     Drug use: No     Sexual activity: Not on file   Other Topics Concern     Not on file   Social History Narrative    12/2002    She has 4 children, one in [place], others in the [place].  She has 8 grandchildren (ages 14 to 11)        .         Bible study, high school friends have reconnected.     Grew up in [place].      Social Determinants of Health     Financial Resource Strain: Low Risk  (12/4/2022)    Overall Financial Resource Strain (CARDIA)      Difficulty of Paying Living Expenses: Not hard at all   Food Insecurity: No Food Insecurity (12/4/2022)    Hunger Vital Sign      Worried About Running Out of Food in the Last Year: Never true      Ran Out of Food in the Last Year: Never true   Transportation Needs: No Transportation Needs (12/4/2022)    PRAPARE - Transportation      Lack of Transportation (Medical): No      Lack of Transportation (Non-Medical): No   Physical Activity: Insufficiently Active (12/4/2022)    Exercise Vital Sign      Days of Exercise per Week: 2 days      Minutes of Exercise per Session: 20 min   Stress: No Stress Concern Present (12/4/2022)    [employer] [place] [employer]  [employer] - Occupational Stress Questionnaire      Feeling of Stress : Not at all   Social Connections: Moderately Integrated (12/4/2022)    Social Connection and Isolation Panel [NHANES]      Frequency of Communication with Friends and Family: More than three times a week      Frequency of Social Gatherings with Friends and Family: Three times a week      Attends [employer] Services: More than 4 times per year      Active Member of Clubs or Organizations: Yes      Attends Club or Organization Meetings: Not on file      Marital Status:    Intimate Partner Violence: Not on file   Housing Stability: Unknown (12/4/2022)    Housing Stability Vital Sign      Unable to Pay for Housing in the Last Year: No      Number of Places Lived in the Last Year: 1      Unstable Housing in the Last Year: Not on file          Allergies        Allergies   Allergen Reactions     Estradiol Hives     Evista [Raloxifene] Hives         Prior to Admission Medications      No current facility-administered medications on file prior to encounter.  acetaminophen (TYLENOL) 500 MG tablet, Take 2 tablets (1,000 mg) by mouth every 8 hours  aspirin 81 MG EC tablet, [ASPIRIN 81 MG EC TABLET] Take 1 tablet (81 mg total) by mouth daily.  calcium citrate-vitamin D3 (CITRACAL + D) 315-250 mg-unit per tablet, Take 1 tablet by mouth At Bedtime  cholecalciferol, vitamin D3, (VITAMIN D3) 1,000 unit capsule, Take 1,000 Units by mouth At Bedtime  hypromellose (ARTIFICIAL TEARS) 0.5 % SOLN ophthalmic solution, Place 1 drop into both eyes every hour as needed for dry eyes  levothyroxine (SYNTHROID/LEVOTHROID) 75 MCG tablet, Take 1 tablet (75 mcg) by mouth daily  multivitamin therapeutic (THERAGRAN) tablet, [MULTIVITAMIN THERAPEUTIC (THERAGRAN) TABLET] Take 1 tablet by mouth daily. (Patient taking differently: Take 1 tablet by mouth At Bedtime)  oxyCODONE (ROXICODONE) 5 MG tablet, Take 0.5-1 tablets (2.5-5 mg) by mouth every 6 hours as needed for  "severe pain  sertraline (ZOLOFT) 25 MG tablet, [SERTRALINE (ZOLOFT) 25 MG TABLET] TAKE 3 TABLETS BY MOUTH EVERY DAY  simvastatin (ZOCOR) 40 MG tablet, TAKE 1 TABLET BY MOUTH EVERYDAY AT BEDTIME  valACYclovir (VALTREX) 500 MG tablet, Take 1 tablet (500 mg) by mouth 2 times daily for 3 days (Patient taking differently: Take 500 mg by mouth 2 times daily as needed flare)            Review of Systems     A 12 point comprehensive review of systems was negative except as noted above in HPI.    PHYSICAL EXAMINATION       Vitals      Vitals: BP (!) 148/67   Pulse 86   Temp 97.6  F (36.4  C) (Temporal)   Resp 24   Ht 1.549 m (5' 1\")   Wt 62.6 kg (138 lb)   SpO2 97%   BMI 26.07 kg/m    BMI= Body mass index is 26.07 kg/m .      Examination     General Appearance:  Alert, cooperative, no distress  Head:    Normocephalic, without obvious abnormality, atraumatic  EENT:  PERRL, conjunctiva/corneas clear, EOM's intact.   Neck:   Supple, symmetrical, trachea midline, no adenopathy; no NVE  Back:  Symmetric, no curvature, no CVA tenderness  Chest/Lungs: Clear to auscultation bilaterally, respirations unlabored, No tenderness or deformity. No abdominal breathing or use of accessory muscles.   Heart:    Regular rate and rhythm, S1 and S2 normal, no murmur, rub   or gallop  Abdomen: Soft, non-tender, bowel sounds active all four quadrants, not peritoneal on palpation. Not distended  Extremities:  Extremities normal, atraumatic, no swelling   Skin:  Skin color, texture, turgor normal, no rashes or lesion  Neurologic:  Awake and alert, No lateralizing or localizing signs       Pertinent Lab     Results for orders placed or performed during the hospital encounter of 06/15/23   Comprehensive metabolic panel   Result Value Ref Range    Sodium 136 136 - 145 mmol/L    Potassium 4.2 3.5 - 5.0 mmol/L    Chloride 101 98 - 107 mmol/L    Carbon Dioxide (CO2) 27 22 - 31 mmol/L    Anion Gap 8 5 - 18 mmol/L    Urea Nitrogen 36 (H) 8 - 28 mg/dL "    Creatinine 0.82 0.60 - 1.10 mg/dL    Calcium 9.4 8.5 - 10.5 mg/dL    Glucose 149 (H) 70 - 125 mg/dL    Alkaline Phosphatase 65 45 - 120 U/L    AST 33 0 - 40 U/L    ALT 28 0 - 45 U/L    Protein Total 6.6 6.0 - 8.0 g/dL    Albumin 3.3 (L) 3.5 - 5.0 g/dL    Bilirubin Total 0.5 0.0 - 1.0 mg/dL    GFR Estimate 74 >60 mL/min/1.73m2   Result Value Ref Range    INR 0.99 0.85 - 1.15   CBC with platelets and differential   Result Value Ref Range    WBC Count 6.7 4.0 - 11.0 10e3/uL    RBC Count 2.52 (L) 3.80 - 5.20 10e6/uL    Hemoglobin 8.2 (L) 11.7 - 15.7 g/dL    Hematocrit 24.6 (L) 35.0 - 47.0 %    MCV 98 78 - 100 fL    MCH 32.5 26.5 - 33.0 pg    MCHC 33.3 31.5 - 36.5 g/dL    RDW 12.7 10.0 - 15.0 %    Platelet Count 177 150 - 450 10e3/uL    % Neutrophils 69 %    % Lymphocytes 16 %    % Monocytes 11 %    % Eosinophils 3 %    % Basophils 1 %    % Immature Granulocytes 0 %    NRBCs per 100 WBC 0 <1 /100    Absolute Neutrophils 4.7 1.6 - 8.3 10e3/uL    Absolute Lymphocytes 1.1 0.8 - 5.3 10e3/uL    Absolute Monocytes 0.7 0.0 - 1.3 10e3/uL    Absolute Eosinophils 0.2 0.0 - 0.7 10e3/uL    Absolute Basophils 0.0 0.0 - 0.2 10e3/uL    Absolute Immature Granulocytes 0.0 <=0.4 10e3/uL    Absolute NRBCs 0.0 10e3/uL   Adult Type and Screen   Result Value Ref Range    ABO/RH(D) A POS     Antibody Screen Negative Negative    SPECIMEN EXPIRATION DATE [id number]            Pertinent Radiology

## 2023-06-16 NOTE — ANESTHESIA POSTPROCEDURE EVALUATION
Patient: Rios Michael    Procedure: Procedure(s):  ESOPHAGOGASTRODUODENOSCOPY with biopsies       Anesthesia Type:  MAC    Note:  Disposition: Outpatient   Postop Pain Control: Uneventful            Sign Out: Well controlled pain   PONV: No   Neuro/Psych: Uneventful            Sign Out: Acceptable/Baseline neuro status   Airway/Respiratory: Uneventful            Sign Out: Acceptable/Baseline resp. status   CV/Hemodynamics: Uneventful            Sign Out: Acceptable CV status; No obvious hypovolemia; No obvious fluid overload   Other NRE: NONE   DID A NON-ROUTINE EVENT OCCUR? No           Last vitals:  Vitals Value Taken Time   /56 06/16/23 1140   Temp 37.2  C (99  F) 06/16/23 1121   Pulse 82 06/16/23 1142   Resp 16 06/16/23 1121   SpO2 94 % 06/16/23 1142   Vitals shown include unvalidated device data.    Electronically Signed By: Rene Anderson MD  June 16, 2023  11:44 AM

## 2023-06-16 NOTE — PHARMACY-ADMISSION MEDICATION HISTORY
Pharmacist Admission Medication History    Admission medication history is complete. The information provided in this note is only as accurate as the sources available at the time of the update.    Medication reconciliation/reorder completed by provider prior to medication history? No    Information Source(s): Patient and CareEverywhere/SureScripts via in-person    Pertinent Information:     Changes made to PTA medication list:    Added: None    Deleted: None    Changed: None    Medication Affordability:No issues       Allergies reviewed with patient and updates made in EHR: yes    Medication History Completed By: Ana Laura Olsen RPH 6/16/2023 8:27 AM    Prior to Admission medications    Medication Sig Last Dose Taking? Auth Provider Long Term End Date   acetaminophen (TYLENOL) 500 MG tablet Take 2 tablets (1,000 mg) by mouth every 8 hours 6/15/2023 at 1430 Yes Teja Kim MD     aspirin 81 MG EC tablet [ASPIRIN 81 MG EC TABLET] Take 1 tablet (81 mg total) by mouth daily. 6/13/2023 Yes Itzel Hanson MD Yes    calcium citrate-vitamin D3 (CITRACAL + D) 315-250 mg-unit per tablet Take 1 tablet by mouth At Bedtime 6/13/2023 Yes Provider, Historical     cholecalciferol, vitamin D3, (VITAMIN D3) 1,000 unit capsule Take 1,000 Units by mouth At Bedtime 6/13/2023 Yes Provider, Historical     hypromellose (ARTIFICIAL TEARS) 0.5 % SOLN ophthalmic solution Place 1 drop into both eyes every hour as needed for dry eyes as needed Yes Unknown, Entered By History     levothyroxine (SYNTHROID/LEVOTHROID) 75 MCG tablet Take 1 tablet (75 mcg) by mouth daily 6/14/2023 at AM Yes Feng Doherty MD Yes    multivitamin therapeutic (THERAGRAN) tablet [MULTIVITAMIN THERAPEUTIC (THERAGRAN) TABLET] Take 1 tablet by mouth daily. 6/13/2023 at PM Yes Sapphire Olsen     oxyCODONE (ROXICODONE) 5 MG tablet Take 0.5-1 tablets (2.5-5 mg) by mouth every 6 hours as needed for severe pain 6/15/2023 at afternoon Yes Feng Doherty  MD Robbie  6/22/23   simvastatin (ZOCOR) 40 MG tablet TAKE 1 TABLET BY MOUTH EVERYDAY AT BEDTIME 6/14/2023 at PM Yes Feng Doherty MD Yes    valACYclovir (VALTREX) 500 MG tablet Take 500 mg by mouth 2 times daily as needed (cold sores) Unknown Yes Unknown, Entered By History No    sertraline (ZOLOFT) 25 MG tablet [SERTRALINE (ZOLOFT) 25 MG TABLET] TAKE 3 TABLETS BY MOUTH EVERY DAY 6/13/2023 at afternoon  Feng Doherty MD Yes

## 2023-06-16 NOTE — ED PROVIDER NOTES
EMERGENCY DEPARTMENT ENCOUNTER      NAME: Rios Michael  AGE: 77 year old female  YOB: 1945  MRN: [id number]  EVALUATION DATE & TIME: 6/15/2023  8:34 PM    PCP: Feng Doherty    ED PROVIDER: Renato Solares M.D.      Chief Complaint   Patient presents with     Melena     Dizziness         FINAL IMPRESSION:  1. Upper GI bleeding          ED COURSE & MEDICAL DECISION MAKIN year old female presents to the Emergency Department for evaluation of dark stool and lightheadedness.  She was recently hospitalized for pelvic fractures which were nonoperative.  She discharged within the last week and over the last few days has developed some dark stools and more recently positional lightheadedness.  Patient does not report any abdominal pain, she has chronic stable pain related to her known pelvic injury.  She had a dark stool at home, she brought the sample to the ED and does appear consistent with melena.  Her hemoglobin today is declined to 8.2, her most recent baseline was in  but this was normal at 13.3.  I think everything is pointing to an upper GI bleed at this time.  Without any abdominal tenderness or pain I do not suspect any kind of perforated gastric ulcer or need for emergent neuroimaging.  Her vital signs are all stable and she is asymptomatic while at rest.  She was given some IV fluid here.  We will admit her to obtain GI consultation and possibly endoscopy.  She was given IV Protonix here and was otherwise stable throughout her initial ED course.  Discussed case with hospitalist.    At the conclusion of the encounter I discussed the results of all of the tests and the disposition. The questions were answered. The patient or family acknowledged understanding and was agreeable with the care plan.     9:03 PM I met with the patient, obtained history, performed an initial exam, and discussed options and plan for diagnostics and treatment here in the ED.  11:25 PM I  discussed case with Dr. Rowan, hospitalist, who accepts the patient for admission.    Medical Decision Making    History:    Supplemental history from: Documented in chart, if applicable    External Record(s) reviewed: Documented in chart, if applicable. and Inpatient Record: [facility] Admission on 6/10/23-6/12/23     Work Up:    Chart documentation includes differential considered and any EKGs or imaging independently interpreted by provider, where specified.    In additional to work up documented, I considered the following work up: Documented in chart, if applicable.    External consultation:    Discussion of management with another provider: Documented in chart, if applicable and Hospitalist    Complicating factors:    Care impacted by chronic illness: N/A    Care affected by social determinants of health: N/A    Disposition considerations: Admit.        MEDICATIONS GIVEN IN THE EMERGENCY:  Medications   melatonin tablet 1 mg (has no administration in time range)   ondansetron (ZOFRAN ODT) ODT tab 4 mg (has no administration in time range)     Or   ondansetron (ZOFRAN) injection 4 mg (has no administration in time range)   sodium chloride 0.9% infusion (has no administration in time range)   acetaminophen (TYLENOL) tablet 650 mg (has no administration in time range)     Or   acetaminophen (TYLENOL) Suppository 650 mg (has no administration in time range)   pantoprazole (PROTONIX) IV push injection 40 mg (has no administration in time range)   oxyCODONE IR (ROXICODONE) half-tab 2.5-5 mg (has no administration in time range)   pantoprazole (PROTONIX) IV push injection 40 mg (40 mg Intravenous $Given 6/15/23 2126)   0.9% sodium chloride BOLUS (0 mLs Intravenous Stopped 6/15/23 2253)   oxyCODONE-acetaminophen (PERCOCET) 5-325 MG per tablet 1 tablet (1 tablet Oral $Given 6/15/23 2252)       NEW PRESCRIPTIONS STARTED AT TODAY'S ER VISIT  New Prescriptions    No medications on file       "    =================================================================    HPI    Patient information was obtained from: Patient    Use of : N/A      Rios Michael is a 77 year old female with a pertinent history of CAD, hyperlipidemia, hypothyroidism, who presents to this ED via walk-in for evaluation of melena and dizziness.    Patient reports she was hospitalized last week after she was found to have multiple pelvic fractures. She had been doing ok after discharge and was having normal bowel movements. Yesterday afternoon, she started becoming fatigued and had a bowel movement that was black in color. She developed some abdominal pain that she describes as \"gas pains\" afterwards. Prior to bed last night, she started feeling lightheadedness and did vomit. Today, she continues to have dark stools and lightheadedness, prompting her to be seen in the ED. No prior history of this. At present, she denies any abdominal pain but has slight nausea when she feels lightheaded. Patient did take an Oxycontin around 1600. Otherwise, she denies any diarrhea. No other complaints at this time.    Per chart review, [place] [facility]6/12/23 for multiple closed fractures of pelvis without disruption of pelvic ring.Patient found to have multiple pelvic fractures. Orthopaedics was consulted and she did not require operative intervention. Patient was seen by PT/OT and discharged home with home services.    REVIEW OF SYSTEMS   All systems reviewed and negative except as noted in HPI.    PAST MEDICAL HISTORY:  Past Medical History:   Diagnosis Date     Arthritis      Breast cyst [id number]     Coronary artery disease      Depression      Disease of thyroid gland      Family history of clotting disorder      Herpes simplex virus (HSV) infection     Created by Conversion  Replacement Utility updated for latest IMO load     High cholesterol      Hypertension      Status post hip replacement, right " 11/12/2019       PAST SURGICAL HISTORY:  Past Surgical History:   Procedure Laterality Date     BREAST CYST EXCISION  [id number]    Removed from [facility]     CARDIAC CATHETERIZATION       CORONARY STENT PLACEMENT       TN PUNC/ASPIR BREAST CYST      Description: Breast Surgery Puncture Aspiration Of Cyst;  Recorded: 07/29/2008;     [facility] TOTAL HIP ARTHROPLASTY Right 11/12/2019    Procedure: RIGHT DIRECT ANTERIOR TOTAL HIP ARTHROPLASTY;  Surgeon: Collin Perez MD;  Location: [facility];  Service: Orthopedics     [facility] CORONARY STENT PERCUT, INITIAL VESSEL      Description: Cath Stent Placement;  Recorded: 06/11/2012;  Comments: ADRIANA to mid RCA           CURRENT MEDICATIONS:    Current Facility-Administered Medications   Medication     acetaminophen (TYLENOL) tablet 650 mg    Or     acetaminophen (TYLENOL) Suppository 650 mg     melatonin tablet 1 mg     ondansetron (ZOFRAN ODT) ODT tab 4 mg    Or     ondansetron (ZOFRAN) injection 4 mg     oxyCODONE IR (ROXICODONE) half-tab 2.5-5 mg     pantoprazole (PROTONIX) IV push injection 40 mg     sodium chloride 0.9% infusion     Current Outpatient Medications   Medication     acetaminophen (TYLENOL) 500 MG tablet     aspirin 81 MG EC tablet     calcium citrate-vitamin D3 (CITRACAL + D) 315-250 mg-unit per tablet     cholecalciferol, vitamin D3, (VITAMIN D3) 1,000 unit capsule     hypromellose (ARTIFICIAL TEARS) 0.5 % SOLN ophthalmic solution     levothyroxine (SYNTHROID/LEVOTHROID) 75 MCG tablet     multivitamin therapeutic (THERAGRAN) tablet     oxyCODONE (ROXICODONE) 5 MG tablet     sertraline (ZOLOFT) 25 MG tablet     simvastatin (ZOCOR) 40 MG tablet     valACYclovir (VALTREX) 500 MG tablet         ALLERGIES:  Allergies   Allergen Reactions     Estradiol Hives     Evista [Raloxifene] Hives       FAMILY HISTORY:  Family History   Problem Relation Age of Onset     Osteoporosis Mother      Dementia Mother      Coronary Artery Disease Father         in 80s      Alzheimer Disease Father      Clotting Disorder Brother         PE at age 64     Other - See Comments Brother         Dementia? Heart issues     Coronary Artery Disease Brother         bypass     Diabetes Type 2  Brother      Pulmonary Embolism Son      Diabetes No family hx of        SOCIAL HISTORY:   Social History     Socioeconomic History     Marital status: Single     Number of children: 4   Tobacco Use     Smoking status: Never     Smokeless tobacco: Never   Substance and Sexual Activity     Alcohol use: Yes     Alcohol/week: 5.0 standard drinks of alcohol     Drug use: No   Social History Narrative    12/2002    She has 4 children, one in [place], others in the [place].  She has 8 grandchildren (ages 14 to 11)        .         Bible study, high school friends have reconnected.     Grew up in [place].      Social Determinants of Health     Financial Resource Strain: Low Risk  (12/4/2022)    Overall Financial Resource Strain (CARDIA)      Difficulty of Paying Living Expenses: Not hard at all   Food Insecurity: No Food Insecurity (12/4/2022)    Hunger Vital Sign      Worried About Running Out of Food in the Last Year: Never true      Ran Out of Food in the Last Year: Never true   Transportation Needs: No Transportation Needs (12/4/2022)    PRAPARE - Transportation      Lack of Transportation (Medical): No      Lack of Transportation (Non-Medical): No   Physical Activity: Insufficiently Active (12/4/2022)    Exercise Vital Sign      Days of Exercise per Week: 2 days      Minutes of Exercise per Session: 20 min   Stress: No Stress Concern Present (12/4/2022)    [place] [place] of Occupational [employer] - Occupational Stress Questionnaire      Feeling of Stress : Not at all   Social Connections: Moderately Integrated (12/4/2022)    Social Connection and Isolation Panel [NHANES]      Frequency of Communication with Friends and Family: More than three times a week      Frequency of Social Gatherings  "with Friends and Family: Three times a week      Attends Holiness Services: More than 4 times per year      Active Member of Clubs or Organizations: Yes      Marital Status:    Housing Stability: Unknown (12/4/2022)    Housing Stability Vital Sign      Unable to Pay for Housing in the Last Year: No      Number of Places Lived in the Last Year: 1       VITALS:  BP (!) 148/67   Pulse 86   Temp 97.6  F (36.4  C) (Temporal)   Resp 24   Ht 1.549 m (5' 1\")   Wt 62.6 kg (138 lb)   SpO2 97%   BMI 26.07 kg/m      PHYSICAL EXAM    Constitutional: Well developed, Well nourished, NAD.  HENT: Normocephalic, Atraumatic. Neck Supple.  Eyes: EOMI, Conjunctiva normal.  Respiratory: Breathing comfortably on room air. Speaks full sentences easily. Lungs clear to ascultation.  Cardiovascular: Normal heart rate, Regular rhythm. No peripheral edema.  Abdomen: Soft, nontender  Musculoskeletal: Good range of motion in all major joints. No major deformities noted.  Integument: Warm, Dry.  Neurologic: Alert & awake, Normal motor function, Normal sensory function, No focal deficits noted.   Psychiatric: Cooperative. Affect appropriate.     LAB:  All pertinent labs reviewed and interpreted.  Labs Ordered and Resulted from Time of ED Arrival to Time of ED Departure   COMPREHENSIVE METABOLIC PANEL - Abnormal       Result Value    Sodium 136      Potassium 4.2      Chloride 101      Carbon Dioxide (CO2) 27      Anion Gap 8      Urea Nitrogen 36 (*)     Creatinine 0.82      Calcium 9.4      Glucose 149 (*)     Alkaline Phosphatase 65      AST 33      ALT 28      Protein Total 6.6      Albumin 3.3 (*)     Bilirubin Total 0.5      GFR Estimate 74     CBC WITH PLATELETS AND DIFFERENTIAL - Abnormal    WBC Count 6.7      RBC Count 2.52 (*)     Hemoglobin 8.2 (*)     Hematocrit 24.6 (*)     MCV 98      MCH 32.5      MCHC 33.3      RDW 12.7      Platelet Count 177      % Neutrophils 69      % Lymphocytes 16      % Monocytes 11      % " Eosinophils 3      % Basophils 1      % Immature Granulocytes 0      NRBCs per 100 WBC 0      Absolute Neutrophils 4.7      Absolute Lymphocytes 1.1      Absolute Monocytes 0.7      Absolute Eosinophils 0.2      Absolute Basophils 0.0      Absolute Immature Granulocytes 0.0      Absolute NRBCs 0.0     INR - Normal    INR 0.99     COMPREHENSIVE METABOLIC PANEL   TYPE AND SCREEN, ADULT    ABO/RH(D) A POS      Antibody Screen Negative      SPECIMEN EXPIRATION DATE [id number]     ABO/RH TYPE AND SCREEN       RADIOLOGY:  Reviewed all pertinent imaging. Please see official radiology report.  US Lower Extremity Venous Duplex Bilateral    (Results Pending)               I, Gerda Eller, am serving as a scribe to document services personally performed by Dr. Renato Solares, based on my observation and the provider's statements to me. I, Renato Solares MD attest that Gerda Eller is acting in a scribe capacity, has observed my performance of the services and has documented them in accordance with my direction.    Renato Solares M.D.  Emergency Medicine  [facility] EMERGENCY ROOM  [address] [id number] [phone]  Dept: [phone]     Renato Solares MD  06/16/23 0009

## 2023-06-16 NOTE — PRE-PROCEDURE
GENERAL PRE-PROCEDURE:   Procedure:  Esophagogastroduodenoscopy   Date/Time:  6/16/2023 10:41 AM    Verbal consent obtained?: Yes    Written consent obtained?: Yes    Risks and benefits: Risks, benefits and alternatives were discussed    Consent given by:  Patient  Patient states understanding of procedure being performed: Yes    Patient's understanding of procedure matches consent: Yes    Procedure consent matches procedure scheduled: Yes    Expected level of sedation:  Moderate  Appropriately NPO:  Yes  ASA Class:  3  Mallampati  :  Grade 2- soft palate, base of uvula, tonsillar pillars, and portion of posterior pharyngeal wall visible  Lungs:  Lungs clear with good breath sounds bilaterally  Heart:  Normal heart sounds and rate  History & Physical reviewed:  History and physical reviewed and no updates needed  Statement of review:  I have reviewed the lab findings, diagnostic data, medications, and the plan for sedation

## 2023-06-16 NOTE — ANESTHESIA CARE TRANSFER NOTE
Patient: Rios Michael    Procedure: Procedure(s):  ESOPHAGOGASTRODUODENOSCOPY with biopsies       Diagnosis: Upper GI bleeding [K92.2]  Diagnosis Additional Information: No value filed.    Anesthesia Type:   MAC     Note:    Oropharynx: spontaneously breathing and oropharynx clear of all foreign objects  Level of Consciousness: awake  Oxygen Supplementation: face mask  Level of Supplemental Oxygen (L/min / FiO2): 6  Independent Airway: airway patency satisfactory and stable  Dentition: dentition unchanged  Vital Signs Stable: post-procedure vital signs reviewed and stable  Report to RN Given: handoff report given  Patient transferred to: Phase II    Handoff Report: Identifed the Patient, Identified the Reponsible Provider, Reviewed the pertinent medical history, Discussed the surgical course, Reviewed Intra-OP anesthesia mangement and issues during anesthesia, Set expectations for post-procedure period and Allowed opportunity for questions and acknowledgement of understanding      Vitals:  Vitals Value Taken Time   /54 06/16/23 1121   Temp 37.2  C (99  F) 06/16/23 1121   Pulse     Resp 16 06/16/23 1121   SpO2 100 % 06/16/23 1121       Electronically Signed By: RICHARD Anderson CRNA  June 16, 2023  11:23 AM

## 2023-06-16 NOTE — CONSULTS
Care Management Initial Consult    General Information  Assessment completed with: Patient, Children, daughter Karen at bedside  Type of CM/SW Visit: Initial Assessment    Primary Care Provider verified and updated as needed: Yes   Readmission within the last 30 days: unable to assess ([facility] 6/10-6/12)      Reason for Consult: discharge planning, facility placement  Advance Care Planning: Advance Care Planning Reviewed: present on chart, verified with patient          Communication Assessment  Patient's communication style: spoken language (English or Bilingual)    Hearing Difficulty or Deaf: no        Cognitive  Cognitive/Neuro/Behavioral: WDL  Level of Consciousness: alert  Arousal Level: opens eyes spontaneously     Mood/Behavior: cooperative, calm          Living Environment:   People in home: alone     Current living Arrangements: house (rambler)      Able to return to prior arrangements:  (TCU then home)       Family/Social Support:  Care provided by: self  Provides care for: pet(s) (1 dog)  Marital Status:   Children (2 daughters and 1 son- all in [place] and involved)          Description of Support System:           Current Resources:   Patient receiving home care services: Yes (pending start of care visit with [facility] for PT/OT)  Skilled Home Care Services: Physical Therapy, Occupational Therapy  Community Resources: Home Care  Equipment currently used at home: walker, rolling  Supplies currently used at home:  (Rx glasses, has been using walker since recent hospital admit and fracture, had a cane from previous)    Employment/Financial:  Employment Status: retired     Employment/ Comments: no  or VA  Financial Concerns: No concerns identified   Referral to Financial Worker: No       Does the patient's insurance plan have a 3 day qualifying hospital stay waiver?  Yes   Will the waiver be used for post-acute placement? Yes    Lifestyle & Psychosocial  Needs:  Social Determinants of Health     Tobacco Use: Low Risk  (6/16/2023)    Patient History      Smoking Tobacco Use: Never      Smokeless Tobacco Use: Never      Passive Exposure: Not on file   Alcohol Use: Not At Risk (12/4/2022)    AUDIT-C      Frequency of Alcohol Consumption: 2-3 times a week      Average Number of Drinks: 1 or 2      Frequency of Binge Drinking: Never   Financial Resource Strain: Low Risk  (12/4/2022)    Overall Financial Resource Strain (CARDIA)      Difficulty of Paying Living Expenses: Not hard at all   Food Insecurity: No Food Insecurity (12/4/2022)    Hunger Vital Sign      Worried About Running Out of Food in the Last Year: Never true      Ran Out of Food in the Last Year: Never true   Transportation Needs: No Transportation Needs (12/4/2022)    PRAPARE - Transportation      Lack of Transportation (Medical): No      Lack of Transportation (Non-Medical): No   Physical Activity: Insufficiently Active (12/4/2022)    Exercise Vital Sign      Days of Exercise per Week: 2 days      Minutes of Exercise per Session: 20 min   Stress: No Stress Concern Present (12/4/2022)    [place] of Occupational Health - Occupational Stress Questionnaire      Feeling of Stress : Not at all   Social Connections: Moderately Integrated (12/4/2022)    Social Connection and Isolation Panel [NHANES]      Frequency of Communication with Friends and Family: More than three times a week      Frequency of Social Gatherings with Friends and Family: Three times a week      Attends Amish Services: More than 4 times per year      Active Member of Clubs or Organizations: Yes      Attends Club or Organization Meetings: Not on file      Marital Status:    Intimate Partner Violence: Not on file   Depression: Not at risk (6/15/2023)    PHQ-2      PHQ-2 Score: 0   Housing Stability: Unknown (12/4/2022)    Housing Stability Vital Sign      Unable to Pay for Housing in the Last Year: No      Number of  Places Lived in the Last Year: 1      Unstable Housing in the Last Year: Not on file       Functional Status:  Prior to admission patient needed assistance:   Dependent ADLs:: Independent (has been using a cane only since discharge from hospital on 6/12)  Dependent IADLs:: Independent (has not been driving since recent hospital stay 6-10-6/12)  Assesssment of Functional Status: Not at baseline with ADL Functioning, Not at baseline with mobility, Not at  functional baseline    Mental Health Status:  Mental Health Status: No Current Concerns (hx of anxiety)  Mental Health Management: Medication (from PCP)    Chemical Dependency Status:  Chemical Dependency Status: No Current Concerns             Values/Beliefs:  Spiritual, Cultural Beliefs, Roman Catholic Practices, Values that affect care: no               Additional Information:  Chart reviewed. Hospitalized at [facility] 6/10-6/12 and discharged home with [facility] for PT/OT.     CM met with patient; assessment completed. Lives alone in [facility]. Was independent with all cares until recent fall and hospitalization. Has only been using walker since hospital discharge. Was pending start of care visit with [facility] (CM updated that patient is currently in hospital and anticipate discharge to TCU). PCP confirmed. HCD validated and updated in [facility] during recent hospital admit.     TCU referrals sent to [facility] per patient request.     [facility] in admissions indicates can accept pending bed availability (CM would need to confirm on date of discharge). This is patients preferred placement but patient and daughter understand bed may not be available.   [facility] in admissions confirms they can accept patient tomorrow, 6/17. Patient and daughter confirm agreeable with this placement if bed not available at [facility].     PT pending. Has [employer] Medicare Advantage Plan and 3 night IP stay not  required; do not need insurance prior authorization for placement.     4:40 PM  PT evaluation done and recommendations are for TCU.     6:10 PM  Daughter Karen is coming to visit and CM will meet with patient and daughter together per patient request to confirm discharge plan.     One of the daughters will transport at hospital discharge.     CM completed PAS= [id number] ([facility] will need to be updated if patient does not discharge to [facility]).       Kina Sloan RN

## 2023-06-16 NOTE — PROGRESS NOTES
[facility] MEDICINE PROGRESS NOTE      Summary: 77 year old female into [facility] in 6/16/2023 after presenting with melena  And a hemoglobin of 8.  Pt was taking daily baby aspirin though no other NSAIDS or   Anticoagulants.  She had a mechanical fall on 6/10 sustaining  3 nonoperative pelvic  Fractures on the left.  She vomited 2 days after discharge and had a few black stools.     Hospital course has been marked by a trip to the EGD suite where Dr Hollins found a  nonbleeding gastric ulcer with pigmented material that did not require endoscopy.  Per GI she should have daily PPI until seen for follow up in 2 months. The ulcer  Was biopsied.    On my interview this afternoon she has no complaints.     Hospital day number 1    Problem list:    1.  Melena; due to PUD/nonbleeding gastric ulcer, continue daily PPI  2.  Anemia: due to gastric ulcer; serial hemoglobins, transfuse at hgb less than 7; serial    Hemoglobins today for likely discharge tomorrow  3.  Hypothyroidism; continue synthroid  4.  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  Pelvic fractures, left: subacute (originally on 6/10); she had PT ordered for home though   Is interested in TCU; pending placement  6.  Disposition:  Pending discussion with CM and TCU; likely will be medically   Cleared for discharge tomorrow      Elsy Bloom MD  [facility] Medicine  [facility]  Phone: #[phone]  Securely message with the Vocera Web Console (learn more here)  Text page via [employer] Paging/Directory     Interval History/Subjective: no abdominal pain; pending EGD later this morning.     Physical Exam/Objective:  Temp:  [97.6  F (36.4  C)-99  F (37.2  C)] 99  F (37.2  C)  Pulse:  [] 75  Resp:  [16-24] 16  BP: (103-148)/(54-81) 112/57  SpO2:  [93 %-100 %] 100 %  Body mass index is 26.07 kg/m .    GENERAL:  Alert, appears comfortable, in no  acute distress, appears stated age   HEAD:  Normocephalic, without obvious abnormality, atraumatic   EYES:  PERRL, conjunctiva/corneas clear, no scleral icterus, EOM's intact   NOSE: Nares normal, septum midline, mucosa normal, no drainage   THROAT: Lips, mucosa, and tongue normal; teeth and gums normal, mouth moist   NECK: Supple, symmetrical, trachea midline   BACK:   Symmetric, no curvature, ROM normal   LUNGS:   Clear to auscultation bilaterally, no rales, rhonchi, or wheezing, symmetric chest rise on inhalation, respirations unlabored   CHEST WALL:  No tenderness or deformity   HEART:  Regular rate and rhythm, S1 and S2 normal, no murmur, rub, or gallop    ABDOMEN:   Soft, non-tender, bowel sounds active all four quadrants, no masses, no organomegaly, no rebound or guarding   EXTREMITIES: Extremities normal, atraumatic, no cyanosis or edema    SKIN: Dry to touch, no exanthems in the visualized areas   NEURO: Alert, oriented x3, moves all four extremities freely   PSYCH: Cooperative, behavior is appropriate      Data reviewed today: I personally reviewed all new medications, labs, imaging/diagnostics reports over the past 24 hours. Pertinent findings include:    Imaging:   Recent Results (from the past 24 hour(s))   US Lower Extremity Venous Duplex Bilateral    Narrative    EXAM: US LOWER EXTREMITY VENOUS DUPLEX BILATERAL  LOCATION: [facility]  DATE: 6/16/2023    INDICATION: Bilateral leg pains recent pelvic fracture, hx of fam blood clots  COMPARISON: None.  TECHNIQUE: Venous Duplex ultrasound of bilateral lower extremities with and without compression, augmentation and duplex. Color flow and spectral Doppler with waveform analysis performed.    FINDINGS: Exam includes the common femoral, femoral, popliteal veins as well as segmentally visualized deep calf veins and greater saphenous vein.     RIGHT: No deep vein thrombosis. No superficial thrombophlebitis. No popliteal cyst.    LEFT:  No deep vein thrombosis. No superficial thrombophlebitis. No popliteal cyst.      Impression    IMPRESSION:  1.  No deep venous thrombosis in the bilateral lower extremities.       Labs:  Most Recent 3 BMP's:Recent Labs   Lab Test 06/16/23  0634 06/15/23  2119 01/05/23  0835    136 143   POTASSIUM 4.0 4.2 4.4   CHLORIDE 108* 101 105   CO2 25 27 26   BUN 25 36* 17.9   CR 0.72 0.82 0.87   ANIONGAP 6 8 12   ALEX 8.5 9.4 9.3   GLC 97 149* 110*       Medications:   Personally Reviewed.  Medications     lactated ringers       sodium chloride 100 mL/hr at 06/16/23 0600       [Auto Hold] pantoprazole  40 mg Intravenous QAM AC     sodium chloride (PF)  3 mL Intracatheter Q8H

## 2023-06-16 NOTE — PLAN OF CARE
Problem: Gastrointestinal Bleeding  Goal: Hemostasis  Outcome: Progressing    Pt alert and oriented x 4. VSS. Lung sounds clear. Denies SOB, chest pain and N/V. Reports pain to L ribs/thigh. Pain being controlled with PRN oxycodone. Will continue to monitor and intervene as needed. On q 8 hr hgb checks. Hgb stable @ 8.2; asymptomatic. Up with assist 1-2. Alarms on for safety. Awaiting TCU placement once medically stable.

## 2023-06-16 NOTE — ED TRIAGE NOTES
Patient arrives to the ER with c/o black stools. Patient had a fall over the weekend, diagnosed with a nondisplaced fracture. Noticed black stools today with some lightheadedness. Denies pain.     Triage Assessment     Row Name 06/15/23 1900       Triage Assessment (Adult)    Airway WDL WDL       Respiratory WDL    Respiratory WDL WDL       Skin Circulation/Temperature WDL    Skin Circulation/Temperature WDL WDL       Cardiac WDL    Cardiac WDL WDL       Peripheral/Neurovascular WDL    Peripheral Neurovascular WDL WDL       Cognitive/Neuro/Behavioral WDL    Cognitive/Neuro/Behavioral WDL WDL  lightheaded

## 2023-06-16 NOTE — CONSULTS
CONSULTING PHYSICIAN   Elsy Bloom MD     REASON FOR CONSULTATION   melena     CHIEF COMPLAINT   Rios Michael came to the hospital for evaluation of melena      HISTORY OF PRESENT ILLNESS   Rios Michael is a 77 year old female with a hx of recent pelvic fracture after a fall, CAD admitted with lightheaded sensation at home and new anemia with melena    Pt notes that on 06/14/23 she had 6 loose black stools. Minimal stools yesterday. She began to notice a lightheaded sensation which prompted her ER visit.    Seen 06/10 for a fall and found to have non displaced pelvic fractures. CBC was not checked on that visit.   Last CBC from 2021 and normal.     Now 8.2.     No NSAIDS  Previous glass of wine daily, now occasional.     No previous EGD.     Last colonoscopy 2021      PAST HISTORY   Past Medical History:   Diagnosis Date     Arthritis      Breast cyst [id number]     Coronary artery disease      Depression      Disease of thyroid gland      Family history of clotting disorder      Herpes simplex virus (HSV) infection     Created by Conversion  Replacement Utility updated for latest IMO load     High cholesterol      Hypertension      Status post hip replacement, right 11/12/2019      Past Surgical History:   Procedure Laterality Date     BREAST CYST EXCISION  [id number]    Removed from [facility] area     CARDIAC CATHETERIZATION       CORONARY STENT PLACEMENT       ME PUNC/ASPIR BREAST CYST      Description: Breast Surgery Puncture Aspiration Of Cyst;  Recorded: 07/29/2008;     [facility] TOTAL HIP ARTHROPLASTY Right 11/12/2019    Procedure: RIGHT DIRECT ANTERIOR TOTAL HIP ARTHROPLASTY;  Surgeon: Collin Perez MD;  [facility]: [facility];  Service: Orthopedics     [facility] CORONARY STENT PERCUT, INITIAL VESSEL      Description: Cath Stent Placement;  Recorded: 06/11/2012;  Comments: ADRIANA to mid RCA        Family History Social History   Family History   Problem Relation Age of  "Onset     Osteoporosis Mother      Dementia Mother      Coronary Artery Disease Father         in 80s     Alzheimer Disease Father      Clotting Disorder Brother         PE at age 64     Other - See Comments Brother         Dementia? Heart issues     Coronary Artery Disease Brother         bypass     Diabetes Type 2  Brother      Pulmonary Embolism Son      Diabetes No family hx of     Social History     Tobacco Use     Smoking status: Never     Smokeless tobacco: Never   Substance Use Topics     Alcohol use: Yes     Alcohol/week: 5.0 standard drinks of alcohol     Drug use: No          MEDICATIONS & ALLERGIES   (Not in a hospital admission)       ALLERGIES   Allergies   Allergen Reactions     Estradiol Hives     Evista [Raloxifene] Hives         REVIEW OF SYSTEMS   A comprehensive review of systems was performed and was otherwise noncontributory.     OBJECTIVE   Vitals Blood pressure 132/63, pulse 84, temperature 98.1  F (36.7  C), temperature source Oral, resp. rate 16, height 1.549 m (5' 1\"), weight 62.6 kg (138 lb), SpO2 96 %.           Physical  Exam   GENERAL: alert and oriented, well nourished in no apparent distress    SKIN: warm and dry, no rashes    HEENT: atraumatic, anicteric, moist mucous membranes, neck soft/supple     PULMONARY: normal resp effort, breath sounds clear to auscultation bilateral    CARDIOVASCULAR: normal rate and rhythm, no murmurs, no edema    ABDOMEN: no tenderness, no distention, bowel sounds normal    MUSCULOSKELETAL: joints and gait normal    NEUROLOGICAL: appropriate mental status, grossly intact  DERM: no rash, no jaundice    PSYCHIATRIC: normal mood, affect and insight        LABORATORY    ELECTROLYTE PANEL   Recent Labs   Lab 06/16/23  0634 06/15/23  2119    136   POTASSIUM 4.0 4.2   CHLORIDE 108* 101   CO2 25 27   GLC 97 149*   CR 0.72 0.82   BUN 25 36*      HEMATOLOGY PANEL   Recent Labs   Lab 06/16/23  0634 06/15/23  2256 06/15/23  2119   HGB 7.8* 8.2*  --    MCV 98 98 "  --    WBC 5.1 6.7  --     177  --    INR  --   --  0.99      LIVER AND PANCREAS PANEL   Recent Labs   Lab 06/16/23  0634 06/15/23  2119   AST 25 33   ALT 22 28   ALKPHOS 54 65   BILITOTAL 0.4 0.5     IMAGING STUDIES        I have reviewed the current diagnostic and laboratory tests.           IMPRESSION   Rios Michael is a 77 year old female with hx of recent pelvic fracture after a fall, CAD admitted with melena    Melena- concerning for PUD or AVM from upper GI source.     Plan EGD today  Continue PPI therapy    NPO now               Kana Hollins MD  Thank you for the opportunity to participate in the care of this patient.   Please feel free to call me with any questions or concerns.  Phone number [phone].

## 2023-06-16 NOTE — UTILIZATION REVIEW
Admission Status; Secondary Review Determination   Under the authority of the Utilization Management Committee, the utilization review process indicated a secondary review on Rios Michael. The review outcome is based on review of the medical records, discussions with staff, and applying clinical experience noted on the date of the review.   (x) Inpatient Status Appropriate - This patient's medical care is consistent with medical management for inpatient care and reasonable inpatient medical practice.     RATIONALE FOR DETERMINATION   Rios Michael is a 77 yr old female with recent hospital stay for pelvic fracture who has been at home but then noted 6 black stools on 6/14/23 and presented to ED on 6/15/23 with lightheadedness.  HGB 8.2.  Last known HGB 13 but from 2021.  Has CAD on ASA, HTN, HLD.  GI did EGD and noted gastric ulcer.  Needs ongoing stay to monitor HGB as dropped to 7.8.  Transfuse as needed (may need to consider >7 given CAD).  PPI currently IV.    At the time of admission with the information available to the attending physician more than 2 nights Hospital complex care was anticipated, based on patient risk of adverse outcome if treated as outpatient and complex care required. Inpatient admission is appropriate based on the Medicare guidelines.   The information on this document is developed by the utilization review team in order for the business office to ensure compliance. This only denotes the appropriateness of proper admission status and does not reflect the quality of care rendered.   The definitions of Inpatient Status and Observation Status used in making the determination above are those provided in the CMS Coverage Manual, Chapter 1 and Chapter 6, section 70.4.   Sincerely,   Marina Lugo MD  Utilization Review  Physician Advisor  [facility]

## 2023-06-17 VITALS
BODY MASS INDEX: 25.92 KG/M2 | HEART RATE: 78 BPM | DIASTOLIC BLOOD PRESSURE: 60 MMHG | HEIGHT: 61 IN | TEMPERATURE: 98.1 F | OXYGEN SATURATION: 93 % | SYSTOLIC BLOOD PRESSURE: 130 MMHG | WEIGHT: 137.3 LBS | RESPIRATION RATE: 18 BRPM

## 2023-06-17 LAB — HGB BLD-MCNC: 7.9 G/DL (ref 11.7–15.7)

## 2023-06-17 PROCEDURE — 250N000013 HC RX MED GY IP 250 OP 250 PS 637: Performed by: EMERGENCY MEDICINE

## 2023-06-17 PROCEDURE — 258N000003 HC RX IP 258 OP 636: Performed by: INTERNAL MEDICINE

## 2023-06-17 PROCEDURE — 99238 HOSP IP/OBS DSCHRG MGMT 30/<: CPT | Performed by: EMERGENCY MEDICINE

## 2023-06-17 PROCEDURE — 250N000011 HC RX IP 250 OP 636: Performed by: INTERNAL MEDICINE

## 2023-06-17 PROCEDURE — 36415 COLL VENOUS BLD VENIPUNCTURE: CPT | Performed by: EMERGENCY MEDICINE

## 2023-06-17 PROCEDURE — 250N000013 HC RX MED GY IP 250 OP 250 PS 637: Performed by: INTERNAL MEDICINE

## 2023-06-17 PROCEDURE — C9113 INJ PANTOPRAZOLE SODIUM, VIA: HCPCS | Performed by: INTERNAL MEDICINE

## 2023-06-17 PROCEDURE — 85018 HEMOGLOBIN: CPT | Performed by: EMERGENCY MEDICINE

## 2023-06-17 RX ORDER — OXYCODONE HYDROCHLORIDE 5 MG/1
2.5-5 TABLET ORAL EVERY 6 HOURS PRN
Qty: 20 TABLET | Refills: 0 | Status: SHIPPED | OUTPATIENT
Start: 2023-06-17 | End: 2023-07-13

## 2023-06-17 RX ORDER — ONDANSETRON 4 MG/1
4 TABLET, ORALLY DISINTEGRATING ORAL EVERY 6 HOURS PRN
Qty: 12 TABLET | Refills: 0 | DISCHARGE
Start: 2023-06-17 | End: 2023-12-15

## 2023-06-17 RX ORDER — PANTOPRAZOLE SODIUM 40 MG/1
40 TABLET, DELAYED RELEASE ORAL
Status: DISCONTINUED | OUTPATIENT
Start: 2023-06-18 | End: 2023-06-17 | Stop reason: HOSPADM

## 2023-06-17 RX ORDER — PANTOPRAZOLE SODIUM 40 MG/1
40 TABLET, DELAYED RELEASE ORAL
Qty: 30 TABLET | Refills: 0 | DISCHARGE
Start: 2023-06-18 | End: 2023-07-12

## 2023-06-17 RX ADMIN — PANTOPRAZOLE SODIUM 40 MG: 40 INJECTION, POWDER, FOR SOLUTION INTRAVENOUS at 06:04

## 2023-06-17 RX ADMIN — OXYCODONE HYDROCHLORIDE 2.5 MG: 5 TABLET ORAL at 10:20

## 2023-06-17 RX ADMIN — LEVOTHYROXINE SODIUM 75 MCG: 0.07 TABLET ORAL at 08:21

## 2023-06-17 RX ADMIN — SODIUM CHLORIDE: 9 INJECTION, SOLUTION INTRAVENOUS at 00:26

## 2023-06-17 RX ADMIN — SERTRALINE HYDROCHLORIDE 75 MG: 25 TABLET ORAL at 08:20

## 2023-06-17 RX ADMIN — ACETAMINOPHEN 650 MG: 325 TABLET ORAL at 06:03

## 2023-06-17 ASSESSMENT — ACTIVITIES OF DAILY LIVING (ADL)
ADLS_ACUITY_SCORE: 33
ADLS_ACUITY_SCORE: 34
ADLS_ACUITY_SCORE: 33

## 2023-06-17 NOTE — PROGRESS NOTES
"GASTROENTEROLOGY PROGRESS NOTE     SUBJECTIVE: hip pan with plans to go to TCU after discharge. HGB low but stable. She wonders if it is safe to be discharged. No melena.     OBJECTIVE:   /60 (BP Location: Right arm)   Pulse 78   Temp 98.1  F (36.7  C) (Oral)   Resp 18   Ht 1.549 m (5' 1\")   Wt 62.3 kg (137 lb 4.8 oz)   SpO2 93%   BMI 25.94 kg/m     Temp (24hrs), Av.2  F (36.8  C), Min:97.8  F (36.6  C), Max:99  F (37.2  C)     Patient Vitals for the past 72 hrs:   Weight   23 0313 62.3 kg (137 lb 4.8 oz)   06/15/23 1859 62.6 kg (138 lb)        Intake/Output Summary (Last 24 hours) at 2023 1021  Last data filed at 2023 0904  Gross per 24 hour   Intake 1991.33 ml   Output 2430 ml   Net -438.67 ml      PHYSICAL EXAM   Constitutional: frail pale female, in bed, no acute distress  Cardiovascular: Regular rate and rhythm.   Respiratory: Clear to auscultation bilaterally, respirations non labored.   Abdomen: Soft, non-tender, non-distended, normally active bowel sounds.  I have reviewed the patient's new clinical lab results:   Recent Labs   Lab Test 23  0608 23  2220 23  1714 23  0634 06/15/23  2256 06/15/23  2119 21  1016 19  0552 19  0745   WBC  --   --   --  5.1 6.7  --  4.3   < >  --    HGB 7.9* 7.9* 8.2* 7.8* 8.2*  --  13.3   < >  --    MCV  --   --   --  98 98  --  99   < >  --    PLT  --   --   --  171 177  --  231   < >  --    INR  --   --   --   --   --  0.99  --   --  1.10    < > = values in this interval not displayed.      Recent Labs   Lab Test 23  0634 06/15/23  2119 01/05/23  0835    136 143   POTASSIUM 4.0 4.2 4.4   CHLORIDE 108* 101 105   CO2 25 27 26   BUN 25 36* 17.9   CR 0.72 0.82 0.87   ANIONGAP 6 8 12   ALEX 8.5 9.4 9.3      Recent Labs   Lab Test 23  0634 06/15/23  2119 01/05/23  0835   ALBUMIN 2.8* 3.3* 3.9   BILITOTAL 0.4 0.5 0.6   ALT 22 28 18   AST 25 33 26   ALKPHOS 54 65 72    EGD  [facility]   [address] [place] [address] MONICA Bañuelos [address]   _______________________________________________________________________________   Patient Name: Rios Michael            Procedure Date: 6/16/2023 11:02 AM   MRN: [id number]                       Account Number: [id number]   YOB: 1945             Admit Type: Outpatient   Age: 77                               Room: [place] [address]   Note Status: Finalized                Attending MD: AIDA WHITEHEAD MD,   Total Sedation Time:                  Instrument Name: EGD Scope 2081   _______________________________________________________________________________       Procedure:             Upper GI endoscopy   Indications:           Melena   Providers:             AIDA WHITEHEAD MD   Referring MD:             Medicines:             Propofol per Anesthesia   Complications:         No immediate complications.   _______________________________________________________________________________   Procedure:             Pre-Anesthesia Assessment:                          - Prior to the procedure, a History and Physical was                          performed, and patient medications and allergies were                          reviewed. The patient is competent. The risks and                          benefits of the procedure and the sedation options and                          risks were discussed with the patient. All questions                          were answered and informed consent was obtained.                          Patient identification and proposed procedure were                          verified by the physician in the pre-procedure area.                          Prophylactic Antibiotics: The patient does not require                          prophylactic antibiotics. Prior Anticoagulants: The                          patient has taken no anticoagulant or antiplatelet                          agents. ASA Grade Assessment: II - A patient with  mild                          systemic disease. After reviewing the risks and                          benefits, the patient was deemed in satisfactory                          condition to undergo the procedure. The anesthesia                          plan was to use moderate sedation / analgesia                          (conscious sedation). Immediately prior to                          administration of medications, the patient was                          re-assessed for adequacy to receive sedatives. The                          heart rate, respiratory rate, oxygen saturations,                          blood pressure, adequacy of pulmonary ventilation, and                          response to care were monitored throughout the                          procedure. The physical status of the patient was                          re-assessed after the procedure.                          After obtaining informed consent, the endoscope was                          passed under direct vision. Throughout the procedure,                          the patient's blood pressure, pulse, and oxygen                          saturations were monitored continuously. The endoscope                          was introduced through the mouth, and advanced to the                          second part of duodenum. The upper GI endoscopy was                          accomplished without difficulty. The patient tolerated                          the procedure well.                                                                                     Findings:        The examined esophagus was normal.        One non-bleeding cratered gastric ulcer with pigmented material was        found in the gastric antrum. The lesion was 10 mm in largest dimension.        Biopsies were taken with a cold forceps for Helicobacter pylori testing.        The examined duodenum was normal.                                                                                      Moderate Sedation:        Moderate (conscious) sedation was administered by the nurse and        supervised by the endoscopist. The patient's oxygen saturation, heart        rate, blood pressure and response to care were monitored.   Impression:            - Normal esophagus.                          - Non-bleeding gastric ulcer with pigmented material.                          Biopsied.                          - Normal examined duodenum.   Recommendation:        - Await pathology results.                          - Repeat upper endoscopy in 2 months to check healing.                          -Low risk for rebleeding (10%). Can discharge in AM if                          hgb stable.                                          Assessment:  77 year old female with findings of anemia ( last HGB Normal in 2021) - EGD with findings of non bleeding gastric ulcer- path pending.   Plan:    Continue PPI daily x 8 weeks  EGD in 8 weeks - GI will call to schedule  Consider an iron infusion if symptomatic anemia.   Await path  GI will sign off, please call with questions/concerns.   Approximately 15  minutes of total time was spent providing patient care, including patient evaluation, reviewing documentation/test result, and .  Teetee Conde [facility]   Office

## 2023-06-17 NOTE — DISCHARGE SUMMARY
[facility] MEDICINE  DISCHARGE SUMMARY     Primary Care Physician: Feng Doherty  Admission Date: 6/15/2023   Discharge Provider: Elsy Bloom MD Discharge Date: 6/17/2023   Diet:   Active Diet and Nourishment Order   Procedures     Regular Diet Adult     Diet       Code Status: Full Code   Activity: as tolerated        Condition at Discharge: Stable     REASON FOR PRESENTATION(See Admission Note for Details)     Black stool    PRINCIPAL & ACTIVE DISCHARGE DIAGNOSES     1.  Melena due to PUD  2.  Gastric ulcer with hemorrhage  3.  Pelvic fractures, subacute (originally on 6/10/2023)  4.  Anemia  5.  History of hypothyroidism      PENDING LABS     Unresulted Labs Ordered in the Past 30 Days of this Admission     Date and Time Order Name Status Description    6/16/2023 11:10 AM Surgical Pathology Exam In process             PROCEDURES ( this hospitalization only)      Procedure(s):  ESOPHAGOGASTRODUODENOSCOPY with biopsies    RECOMMENDATIONS TO OUTPATIENT PROVIDER FOR F/U VISIT     Per [facility] for pelvic fractures; [facility] recommended 2 month  Repeat EGD for follow up      DISPOSITION     Skilled Nursing Facility    SUMMARY OF HOSPITAL COURSE:      77 year old female into [facility] on 6/15/2023 after presenting with melena.  PMHx includes  A mechanical fall on 6/10 resulting in 3 nonoperative pelvic fractures.  2 days after the fall  She ended up with melena, coffee ground emesis.  Pts hemoglobin on admission was 8.2  Which drifted to 7.8.  She was only on baby aspirin for previous CAD issues. (no recent stenting).  The GI team consulted on the case.  On 6/16 she had an EGD that showed a nonbleeding  Gastric ulcer with some pigmentation.  She was put on once daily PPI. She did not have  Further melena. She did not require blood transfusion. She tolerated orals.  The ulcer was  Biopsied.  Recommendations are for follow up EGD in 8/2023 to assess for  healing.  Due  To pts recent pelvic fractures and deconditioning it was decided she will be dischaged to  A TCU for rehab which was arranged.  On the day of discharge she is medially stable with  Good pain control.      Discharge Medications with Med changes:     Current Discharge Medication List      START taking these medications    Details   ondansetron (ZOFRAN ODT) 4 MG ODT tab Take 1 tablet (4 mg) by mouth every 6 hours as needed for nausea or vomiting  Qty: 12 tablet, Refills: 0    Associated Diagnoses: Upper GI bleeding      pantoprazole (PROTONIX) 40 MG EC tablet Take 1 tablet (40 mg) by mouth every morning (before breakfast)  Qty: 30 tablet, Refills: 0    Associated Diagnoses: Upper GI bleeding         CONTINUE these medications which have NOT CHANGED    Details   acetaminophen (TYLENOL) 500 MG tablet Take 2 tablets (1,000 mg) by mouth every 8 hours  Qty: 30 tablet, Refills: 0    Associated Diagnoses: Multiple closed fractures of pelvis without disruption of pelvic ring, initial encounter (H)      aspirin 81 MG EC tablet [ASPIRIN 81 MG EC TABLET] Take 1 tablet (81 mg total) by mouth daily.  Refills: 0    Associated Diagnoses: Coronary artery disease involving native coronary artery of native heart without angina pectoris      calcium citrate-vitamin D3 (CITRACAL + D) 315-250 mg-unit per tablet Take 1 tablet by mouth At Bedtime      cholecalciferol, vitamin D3, (VITAMIN D3) 1,000 unit capsule Take 1,000 Units by mouth At Bedtime      hypromellose (ARTIFICIAL TEARS) 0.5 % SOLN ophthalmic solution Place 1 drop into both eyes every hour as needed for dry eyes      levothyroxine (SYNTHROID/LEVOTHROID) 75 MCG tablet Take 1 tablet (75 mcg) by mouth daily  Qty: 90 tablet, Refills: 3    Associated Diagnoses: Acquired hypothyroidism      multivitamin therapeutic (THERAGRAN) tablet [MULTIVITAMIN THERAPEUTIC (THERAGRAN) TABLET] Take 1 tablet by mouth daily.  Qty: 90 tablet, Refills: 1    Associated Diagnoses:  Unspecified essential hypertension      oxyCODONE (ROXICODONE) 5 MG tablet Take 0.5-1 tablets (2.5-5 mg) by mouth every 6 hours as needed for severe pain  Qty: 28 tablet, Refills: 0    Associated Diagnoses: Multiple closed fractures of pelvis without disruption of pelvic ring with routine healing, subsequent encounter      simvastatin (ZOCOR) 40 MG tablet TAKE 1 TABLET BY MOUTH EVERYDAY AT BEDTIME  Qty: 90 tablet, Refills: 3    Associated Diagnoses: Atherosclerosis of native coronary artery of native heart without angina pectoris; Mixed hyperlipidemia      valACYclovir (VALTREX) 500 MG tablet Take 500 mg by mouth 2 times daily as needed (cold sores)      sertraline (ZOLOFT) 25 MG tablet [SERTRALINE (ZOLOFT) 25 MG TABLET] TAKE 3 TABLETS BY MOUTH EVERY DAY  Qty: 270 tablet, Refills: 3    Associated Diagnoses: Anxiety                       Consults       GASTROENTEROLOGY IP CONSULT  PHYSICAL THERAPY ADULT IP CONSULT  CARE MANAGEMENT / SOCIAL WORK IP CONSULT  PHYSICAL THERAPY ADULT IP CONSULT  OCCUPATIONAL THERAPY ADULT IP CONSULT    Immunizations given this encounter     Most Recent Immunizations   Administered Date(s) Administered     COVID-19 Bivalent 18+ (Moderna) 04/21/2023     COVID-19 Monovalent 18+ (Moderna) 03/31/2022     DT (PEDS <7y) 03/18/2004     FLU 6-35 months 10/23/2014     Flu, Unspecified 08/15/2021     HepA, Unspecified 12/12/2011     Hepatitis A (ADULT 19+) 12/12/2011     Influenza (H1N1) 01/11/2010     Influenza (High Dose) 3 valent vaccine 09/18/2019     Influenza (IIV3) PF 10/15/2013     Influenza Vaccine 65+ (Fluzone HD) 09/18/2022     Influenza Vaccine, 6+MO IM (QUADRIVALENT W/PRESERVATIVES) 10/24/2014     Pneumo Conj 13-V (2010&after) 06/09/2015     Pneumococcal 23 valent 11/13/2013     Pneumococcal, Unspecified 06/09/2015     TD,PF 7+ (Tenivac) 12/08/2021     TDAP (Adacel,Boostrix) 10/02/2009     Td (Adult), Adsorbed 03/18/2004     Td,adult,historic,unspecified 03/18/2004     Zoster  recombinant adjuvanted (SHINGRIX) 09/18/2019     Zoster vaccine, live 11/11/2009            SIGNIFICANT IMAGING FINDINGS     Results for orders placed or performed during the hospital encounter of 06/15/23   US Lower Extremity Venous Duplex Bilateral    Impression    IMPRESSION:  1.  No deep venous thrombosis in the bilateral lower extremities.       SIGNIFICANT LABORATORY FINDINGS     Most Recent 3 BMP's:Recent Labs   Lab Test 06/16/23  0634 06/15/23  2119 01/05/23  0835    136 143   POTASSIUM 4.0 4.2 4.4   CHLORIDE 108* 101 105   CO2 25 27 26   BUN 25 36* 17.9   CR 0.72 0.82 0.87   ANIONGAP 6 8 12   ALEX 8.5 9.4 9.3   GLC 97 149* 110*           Discharge Orders        General info for SNF    Length of Stay Estimate: weeks  Condition at Discharge: stable  Level of care: mild to moderate  Rehabilitation Potential: good  Admission H&P remains valid and up-to-date: yes  Recent Chemotherapy: no  Use Nursing Home Standing Orders: Yes     Follow Up and recommended labs and tests    Per [facility]     Reason for your hospital stay    Melena, anemia     Activity - Up with assistive device     Full Code     Physical Therapy Adult Consult    Evaluate and treat as clinically indicated.    Pelvic fractures; deconditioned     Occupational Therapy Adult Consult    Evaluate and treat as clinically indicated.    Pelvic fractures, deconditioned     Fall precautions     Diet    regular       Examination   Physical Exam   Temp:  [97.8  F (36.6  C)-98.8  F (37.1  C)] 98.1  F (36.7  C)  Pulse:  [73-88] 78  Resp:  [16-20] 18  BP: (107-134)/(54-61) 130/60  SpO2:  [90 %-100 %] 93 %  Wt Readings from Last 1 Encounters:   06/17/23 62.3 kg (137 lb 4.8 oz)       General Appearance: I am ready for TCU; no new issues  Respiratory: clear bilaterally  Cardiovascular: regular  GI: soft, NDNT, +BS  Skin:  No rashes        Please see EMR for more detailed significant labs, imaging, consultant notes etc.    I, Elsy Bloom MD,  personally saw the patient today and spent less than or equal to 30 minutes discharging this patient.    Elsy Bloom MD  [facility]    CC:Feng Doherty

## 2023-06-17 NOTE — PLAN OF CARE
Problem: Risk for Delirium  Goal: Improved Sleep  Outcome: Progressing   Goal Outcome Evaluation:       Patient slept well on shift. Complaints of pain at left hip and was given PRN Tylenol. IVF running. Up with assist of one with walker to bathroom. Hypoactive bowel sounds, passing gas, voiding well.

## 2023-06-17 NOTE — PLAN OF CARE
Goal Outcome Evaluation:       Patient alert and orientated.   No black stools reported this shift. Voiding well. No pain when at rest however, with movement patient does rate pain a 5-6/10 to pelvis and left hip. PRN oxycodone given with good results. IVF running.

## 2023-06-17 NOTE — PROGRESS NOTES
Care Management Discharge Note    Discharge Date: 06/17/2023     Discharge Disposition: Transitional Care    Discharge Services:  (TCU)    Discharge DME:  (nothing new)    Discharge Transportation: family or friend will provide (daughter to transport)    Does the patient's insurance plan have a 3 day qualifying hospital stay waiver?  No    PAS Confirmation Code:  [id number]    Patient/family educated on Medicare website which has current facility and service quality ratings: yes    Education Provided on the Discharge Plan:  Acceptance of TCU placement and determination of transport/time.    Persons Notified of Discharge Plans: Pt and her daughter Gisela.    Patient/Family in Agreement with the Plan: yes    Handoff Referral Completed:  Yes    Additional Information:  Writer was requested to visit pt and her daughter to discuss the possibility of TCU placement at [facility]. Writer called and received approval to transfer pt to said TCU, as indicated to be the preferred placement per CM charting 6/16 and pt/fam report. TCU RN stated pt can arrive any time prior to 1800 hrs. Pt, daughter Gisela, bedside RN, unit [facility], and Hospitalist have all been made aware of this discharge disposition.     Writer attempted to contact the [facility] and update the placement location. There was no answer as closed on weekends and no voicemail offered. Unit [facility] has been provided with the requested alternative Fax# of [phone] for order transfer and phone# of [phone] for RN report and general contact. No further CM needs identified.    James Lebron RN

## 2023-06-17 NOTE — PROGRESS NOTES
Physical Therapy Discharge Summary    Reason for therapy discharge:    Discharged to transitional care facility.    Progress towards therapy goal(s). See goals on Care Plan in [facility] electronic health record for goal details.  Goals not met.  Barriers to achieving goals:   discharge on same date as initial evaluation.    Therapy recommendation(s):    Continued therapy is recommended.  Rationale/Recommendations:  TCU.

## 2023-06-19 ENCOUNTER — PATIENT OUTREACH (OUTPATIENT)
Dept: CARE COORDINATION | Facility: CLINIC | Age: 78
End: 2023-06-19
Payer: COMMERCIAL

## 2023-06-19 NOTE — PROGRESS NOTES
[facility]    Background: Transitional Care Management program identified per system criteria and reviewed by [facility] team for possible outreach.    Assessment: Upon chart review, CCR Team member will not proceed with patient outreach related to this episode of Transitional Care Management program due to reason below:    [facility] TCU: Patient discharged to TCU/ARU/LTACH and is established within [facility] Primary [facility]. Referral created for Primary Care-Care Coordination program.    Plan: Transitional Care Management episode addressed appropriately per reason noted above.      Kerline Torres MA  [facility], [facility]    *Connected Care Resource Team does NOT follow patient ongoing. Referrals are identified based on internal discharge reports and the outreach is to ensure patient has an understanding of their discharge instructions.

## 2023-06-20 ENCOUNTER — PATIENT OUTREACH (OUTPATIENT)
Dept: CARE COORDINATION | Facility: CLINIC | Age: 78
End: 2023-06-20

## 2023-06-20 LAB
PATH REPORT.COMMENTS IMP SPEC: NORMAL
PATH REPORT.COMMENTS IMP SPEC: NORMAL
PATH REPORT.FINAL DX SPEC: NORMAL
PATH REPORT.GROSS SPEC: NORMAL
PATH REPORT.MICROSCOPIC SPEC OTHER STN: NORMAL
PATH REPORT.RELEVANT HX SPEC: NORMAL
PHOTO IMAGE: NORMAL

## 2023-06-20 PROCEDURE — 88342 IMHCHEM/IMCYTCHM 1ST ANTB: CPT | Mod: 26 | Performed by: PATHOLOGY

## 2023-06-20 PROCEDURE — 88305 TISSUE EXAM BY PATHOLOGIST: CPT | Mod: 26 | Performed by: PATHOLOGY

## 2023-06-20 ASSESSMENT — ACTIVITIES OF DAILY LIVING (ADL): DEPENDENT_IADLS:: INDEPENDENT

## 2023-06-20 NOTE — LETTER
[facility]   To:   [facility]          Please give to facility    From:   Mukesh Gibbs  [facility]  Care Coordinator   [facility]   P: [phone]  [email or web address]   Patient Name:  Rios Michael YOB: 1945   Admit date: 6/17/23      *Information Needed:  Please contact me when the patient will discharge (or if they will move to long term care)- include the discharge date, disposition, and main diagnosis   If the patient is discharged with home care services, please provide the name of the agency    Also- Please inform me if a care conference is being held.   Phone, Fax or Email with information                              Thank you

## 2023-06-22 ENCOUNTER — TRANSITIONAL CARE UNIT VISIT (OUTPATIENT)
Dept: GERIATRICS | Facility: CLINIC | Age: 78
End: 2023-06-22
Payer: COMMERCIAL

## 2023-06-22 ENCOUNTER — LAB REQUISITION (OUTPATIENT)
Dept: LAB | Facility: CLINIC | Age: 78
End: 2023-06-22
Payer: COMMERCIAL

## 2023-06-22 VITALS
RESPIRATION RATE: 16 BRPM | BODY MASS INDEX: 25.68 KG/M2 | HEART RATE: 75 BPM | TEMPERATURE: 98.7 F | OXYGEN SATURATION: 98 % | WEIGHT: 136 LBS | HEIGHT: 61 IN | SYSTOLIC BLOOD PRESSURE: 121 MMHG | DIASTOLIC BLOOD PRESSURE: 78 MMHG

## 2023-06-22 DIAGNOSIS — D62 ABLA (ACUTE BLOOD LOSS ANEMIA): ICD-10-CM

## 2023-06-22 DIAGNOSIS — S32.82XA MULTIPLE CLOSED FRACTURES OF PELVIS WITHOUT DISRUPTION OF PELVIC RING, INITIAL ENCOUNTER (H): ICD-10-CM

## 2023-06-22 DIAGNOSIS — E03.9 HYPOTHYROIDISM, UNSPECIFIED TYPE: ICD-10-CM

## 2023-06-22 DIAGNOSIS — F41.9 ANXIETY: ICD-10-CM

## 2023-06-22 DIAGNOSIS — I25.10 ATHEROSCLEROSIS OF NATIVE CORONARY ARTERY OF NATIVE HEART WITHOUT ANGINA PECTORIS: ICD-10-CM

## 2023-06-22 DIAGNOSIS — K92.2 UPPER GI BLEEDING: Primary | ICD-10-CM

## 2023-06-22 DIAGNOSIS — K92.2 GASTROINTESTINAL HEMORRHAGE, UNSPECIFIED: ICD-10-CM

## 2023-06-22 PROCEDURE — 99305 1ST NF CARE MODERATE MDM 35: CPT | Performed by: FAMILY MEDICINE

## 2023-06-22 RX ORDER — FERROUS SULFATE 325(65) MG
325 TABLET, DELAYED RELEASE (ENTERIC COATED) ORAL DAILY
COMMUNITY
End: 2023-07-14

## 2023-06-22 NOTE — LETTER
6/22/2023        RE: Rios Michael  [address]  [facility] [address]        [facility] GERIATRIC SERVICES       Patient Rios Michael  MRN: [id number]        Reason for Visit     Chief Complaint   Patient presents with     Hospital F/U       Code Status     CPR/Full code     Assessment     S/p EGD with finding of nonbleeding gastric ulcer-6/16/23  GI bleed  mechanical fall  Pelvic fracture-LC1 pelvic ring fracture   Left inferior pubic ramus fracture  Left sacral ala fracture  Left puboacetabular fracture   ABLA  Hypothyroidism  HLD  Generalized weakness    Plan     Pt is admitted to TCU for strengthening and rehab.  PT initially admitted with multiple pelvic fracture  Non operative management given  WBAT / walker recommended  Pain control optimized she is on scheduled Tylenol 1 g 3 times daily.  She does have oxycodone available as needed.  Advised icing.  Patient is reporting pain is controlled when she is resting  Given asa 81mg BID for dvt prophylaxis by Ortho but unfortunately not sure what happened she never received her 81 twice daily but continued on 81 mg daily  Readmitted with GI bleed  S/p EGD and finding of gastric ulcer  Discharged on protonix [id number]  Started on po iron today  However she has also been continued on her aspirin 81 mg daily.  I had a detailed discussion with this patient regarding this issue she is somewhat hesitant to stop her aspirin now that she has been continued on it from the hospital.  We decided that we will be checking a hemoglobin on her and if it is dropping she may have to hold her aspirin she is okay with that.  Also reviewed her iron infusion concerns she was not given an iron infusion and she has not been put on an oral iron.  Again advised would like to follow-up on her labs before making the decision  Recommend Fe infusion for anemia in the hospital but that was not done  Patient was advised that she will need to follow-up with her primary care physician if  she ends up needing fe infusion  Pain is minimal at rest  Pt is self transferring  Recheck labs  Continue with PT/OT    History     Patient is a very pleasant 77 year old female who is admitted to TCU  Pt was admitted initially with a mechanical fall  She was noted to have pelvic fractures which are being managed nonoperatively.  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      Past Social History     Reviewed,  reports that she has never smoked. She has never used smokeless tobacco. She reports current alcohol use of about 5.0 standard drinks of alcohol per week. She reports that she does not use drugs.    Family History     Reviewed, and family history includes Alzheimer Disease in her father; Clotting Disorder in her brother; Coronary Artery Disease in her brother and father; Dementia in her mother; Diabetes Type 2  in her brother; Osteoporosis in her mother; Other - See Comments in her brother; Pulmonary Embolism in her son.    Medication List     Current Outpatient Medications   Medication     acetaminophen (TYLENOL) 500 MG tablet      aspirin 81 MG EC tablet     cholecalciferol, vitamin D3, (VITAMIN D3) 1,000 unit capsule     hypromellose (ARTIFICIAL TEARS) 0.5 % SOLN ophthalmic solution     levothyroxine (SYNTHROID/LEVOTHROID) 75 MCG tablet     multivitamin therapeutic (THERAGRAN) tablet     ondansetron (ZOFRAN ODT) 4 MG ODT tab     oxyCODONE (ROXICODONE) 5 MG tablet     oxyCODONE (ROXICODONE) 5 MG tablet     pantoprazole (PROTONIX) 40 MG EC tablet     sertraline (ZOLOFT) 25 MG tablet     simvastatin (ZOCOR) 40 MG tablet     valACYclovir (VALTREX) 500 MG tablet     calcium citrate-vitamin D3 (CITRACAL + D) 315-250 mg-unit per tablet     No current facility-administered medications for this visit.      MED REC REQUIRED  Post Medication Reconciliation Status: discharge medications reconciled and changed, per note/orders       Allergies     Allergies   Allergen Reactions     Estradiol Hives     Evista [Raloxifene] Hives       Review of Systems   A comprehensive review of 14 systems was done. Pertinent findings noted here and in history of present illness. All the rest negative.  Constitutional: Negative.  Negative for fever, chills, she has  activity change, appetite change and fatigue.   HENT: Negative for congestion and facial swelling.    Eyes: Negative for photophobia, redness and visual disturbance.   Respiratory: Negative for cough and chest tightness.    Cardiovascular: Negative for chest pain, palpitations and leg swelling.   Gastrointestinal: Negative for nausea, diarrhea, constipation, blood in stool and abdominal distention.   Genitourinary: Negative.    Musculoskeletal: has minimal pain at rest  She is reporting pain of 5 with ambulation   Skin: Negative.    Neurological: Negative for dizziness, tremors, syncope, weakness, light-headedness and headaches.   Hematological: Does not bruise/bleed easily.   Psychiatric/Behavioral: Negative.        Physical Exam   /78   Pulse 75   Temp 98.7  F (37.1  C)   Resp 16   Ht 1.549 m (5'  "1\")   Wt 61.7 kg (136 lb)   SpO2 98%   BMI 25.70 kg/m     GENERAL: no acute distress. Cooperative in conversation.   HEENT: pupils are equal, round and reactive. Oral mucosa is moist and intact.  cvs s1s2  RESP:Chest symmetric. Regular respiratory rate. No stridor.  ABD: Nondistended, soft.  EXTREMITIES: No lower extremity edema.   NEURO: non focal. Alert and oriented x3.   PSYCH: within normal limits. No depression or anxiety.  SKIN: warm dry intact       Lab Results     Recent Results (from the past 240 hour(s))   Comprehensive metabolic panel    Collection Time: 06/15/23  9:19 PM   Result Value Ref Range    Sodium 136 136 - 145 mmol/L    Potassium 4.2 3.5 - 5.0 mmol/L    Chloride 101 98 - 107 mmol/L    Carbon Dioxide (CO2) 27 22 - 31 mmol/L    Anion Gap 8 5 - 18 mmol/L    Urea Nitrogen 36 (H) 8 - 28 mg/dL    Creatinine 0.82 0.60 - 1.10 mg/dL    Calcium 9.4 8.5 - 10.5 mg/dL    Glucose 149 (H) 70 - 125 mg/dL    Alkaline Phosphatase 65 45 - 120 U/L    AST 33 0 - 40 U/L    ALT 28 0 - 45 U/L    Protein Total 6.6 6.0 - 8.0 g/dL    Albumin 3.3 (L) 3.5 - 5.0 g/dL    Bilirubin Total 0.5 0.0 - 1.0 mg/dL    GFR Estimate 74 >60 mL/min/1.73m2   INR    Collection Time: 06/15/23  9:19 PM   Result Value Ref Range    INR 0.99 0.85 - 1.15   Adult Type and Screen    Collection Time: 06/15/23  9:19 PM   Result Value Ref Range    ABO/RH(D) A POS     Antibody Screen Negative Negative    SPECIMEN EXPIRATION DATE [id number]    CBC with platelets and differential    Collection Time: 06/15/23 10:56 PM   Result Value Ref Range    WBC Count 6.7 4.0 - 11.0 10e3/uL    RBC Count 2.52 (L) 3.80 - 5.20 10e6/uL    Hemoglobin 8.2 (L) 11.7 - 15.7 g/dL    Hematocrit 24.6 (L) 35.0 - 47.0 %    MCV 98 78 - 100 fL    MCH 32.5 26.5 - 33.0 pg    MCHC 33.3 31.5 - 36.5 g/dL    RDW 12.7 10.0 - 15.0 %    Platelet Count 177 150 - 450 10e3/uL    % Neutrophils 69 %    % Lymphocytes 16 %    % Monocytes 11 %    % Eosinophils 3 %    % Basophils 1 %    % " Immature Granulocytes 0 %    NRBCs per 100 WBC 0 <1 /100    Absolute Neutrophils 4.7 1.6 - 8.3 10e3/uL    Absolute Lymphocytes 1.1 0.8 - 5.3 10e3/uL    Absolute Monocytes 0.7 0.0 - 1.3 10e3/uL    Absolute Eosinophils 0.2 0.0 - 0.7 10e3/uL    Absolute Basophils 0.0 0.0 - 0.2 10e3/uL    Absolute Immature Granulocytes 0.0 <=0.4 10e3/uL    Absolute NRBCs 0.0 10e3/uL   Comprehensive metabolic panel    Collection Time: 06/16/23  6:34 AM   Result Value Ref Range    Sodium 139 136 - 145 mmol/L    Potassium 4.0 3.5 - 5.0 mmol/L    Chloride 108 (H) 98 - 107 mmol/L    Carbon Dioxide (CO2) 25 22 - 31 mmol/L    Anion Gap 6 5 - 18 mmol/L    Urea Nitrogen 25 8 - 28 mg/dL    Creatinine 0.72 0.60 - 1.10 mg/dL    Calcium 8.5 8.5 - 10.5 mg/dL    Glucose 97 70 - 125 mg/dL    Alkaline Phosphatase 54 45 - 120 U/L    AST 25 0 - 40 U/L    ALT 22 0 - 45 U/L    Protein Total 5.4 (L) 6.0 - 8.0 g/dL    Albumin 2.8 (L) 3.5 - 5.0 g/dL    Bilirubin Total 0.4 0.0 - 1.0 mg/dL    GFR Estimate 86 >60 mL/min/1.73m2   CBC with platelets and differential    Collection Time: 06/16/23  6:34 AM   Result Value Ref Range    WBC Count 5.1 4.0 - 11.0 10e3/uL    RBC Count 2.42 (L) 3.80 - 5.20 10e6/uL    Hemoglobin 7.8 (L) 11.7 - 15.7 g/dL    Hematocrit 23.6 (L) 35.0 - 47.0 %    MCV 98 78 - 100 fL    MCH 32.2 26.5 - 33.0 pg    MCHC 33.1 31.5 - 36.5 g/dL    RDW 12.7 10.0 - 15.0 %    Platelet Count 171 150 - 450 10e3/uL    % Neutrophils 57 %    % Lymphocytes 24 %    % Monocytes 12 %    % Eosinophils 6 %    % Basophils 1 %    % Immature Granulocytes 0 %    NRBCs per 100 WBC 0 <1 /100    Absolute Neutrophils 2.9 1.6 - 8.3 10e3/uL    Absolute Lymphocytes 1.2 0.8 - 5.3 10e3/uL    Absolute Monocytes 0.6 0.0 - 1.3 10e3/uL    Absolute Eosinophils 0.3 0.0 - 0.7 10e3/uL    Absolute Basophils 0.0 0.0 - 0.2 10e3/uL    Absolute Immature Granulocytes 0.0 <=0.4 10e3/uL    Absolute NRBCs 0.0 10e3/uL   UPPER GI ENDOSCOPY    Collection Time: 06/16/23 11:02 AM   Result Value Ref  Range    Upper GI Endoscopy       [facility]  [address] [place] [address]  _______________________________________________________________________________  Patient Name: Rios Michael            Procedure Date: 6/16/2023 11:02 AM  MRN: [id number]                       Account Number: [id number]  YOB: 1945             Admit Type: Outpatient  Age: 77                               Room: [place] [address]  Note Status: Finalized                Attending MD: AIDA WHITEHEAD MD,   Total Sedation Time:                  Instrument Name: EGD Scope 2081  _______________________________________________________________________________     Procedure:             Upper GI endoscopy  Indications:           Melena  Providers:             AIDA WHITEHEAD MD  Referring MD:            Medicines:             Propofol per Anesthesia  Complications:         No immediate complications.  _______________________________________________________________________________  Procedure:             Pre-A nesthesia Assessment:                         - Prior to the procedure, a History and Physical was                          performed, and patient medications and allergies were                          reviewed. The patient is competent. The risks and                          benefits of the procedure and the sedation options and                          risks were discussed with the patient. All questions                          were answered and informed consent was obtained.                          Patient identification and proposed procedure were                          verified by the physician in the pre-procedure area.                          Prophylactic Antibiotics: The patient does not require                          prophylactic antibiotics. Prior Anticoagulants: The                          patient has taken no anticoagulant or antiplatelet                          agents. ASA Grade  Assessment: II - A patient with mild                          systemic disease. After  reviewing the risks and                          benefits, the patient was deemed in satisfactory                          condition to undergo the procedure. The anesthesia                          plan was to use moderate sedation / analgesia                          (conscious sedation). Immediately prior to                          administration of medications, the patient was                          re-assessed for adequacy to receive sedatives. The                          heart rate, respiratory rate, oxygen saturations,                          blood pressure, adequacy of pulmonary ventilation, and                          response to care were monitored throughout the                          procedure. The physical status of the patient was                          re-assessed after the procedure.                         After obtaining informed consent, the endoscope was                          passed under direct vision. Throughout the procedure,                           the patient's blood pressure, pulse, and oxygen                          saturations were monitored continuously. The endoscope                          was introduced through the mouth, and advanced to the                          second part of duodenum. The upper GI endoscopy was                          accomplished without difficulty. The patient tolerated                          the procedure well.                                                                                   Findings:       The examined esophagus was normal.       One non-bleeding cratered gastric ulcer with pigmented material was        found in the gastric antrum. The lesion was 10 mm in largest dimension.        Biopsies were taken with a cold forceps for Helicobacter pylori testing.       The examined duodenum was normal.                                                                                    Moderate Sedation:       Moderate (conscious) sedation was administered by the nurse and        super vised by the endoscopist. The patient's oxygen saturation, heart        rate, blood pressure and response to care were monitored.  Impression:            - Normal esophagus.                         - Non-bleeding gastric ulcer with pigmented material.                          Biopsied.                         - Normal examined duodenum.  Recommendation:        - Await pathology results.                         - Repeat upper endoscopy in 2 months to check healing.                         -Low risk for rebleeding (10%). Can discharge in AM if                          hgb stable.                                                                                       ____________________  KANA WHITEHEAD MD  6/16/2023 11:17:36 AM  I was physically present for the entire viewing portion of the exam.  __________________________  Signature of teaching physician  [id number] MD VENTURA  Number of Addenda: 0    Note Initiated On: 6/16/2023 11:02 AM  Scope In: 11:08:58 AM  Scope Out: 11:12:12 A      Surgical Pathology Exam    Collection Time: 06/16/23 11:10 AM   Result Value Ref Range    Case Report       Surgical Pathology Report                         Case: [id number]                                  Authorizing Provider:  Kana Whitehead MD      Collected:           06/16/2023 11:10 AM          Ordering Location:     [facility]          Received:            06/16/2023 11:31 AM                                 [facility] OR                                                   Pathologist:           Ole Andre MD                                                      Specimen:    Stomach, stomach biopsies                                                                  Final Diagnosis       STOMACH, BIOPSY:   -ANTRAL AND ANTRAL FUNDIC TRANSITIONAL ZONE MUCOSA  WITH MILD CHRONIC GASTRITIS  -NO INTESTINAL METAPLASIA  -NO HELICOBACTER PYLORI      Clinical Information       Procedure: ESOPHAGOGASTRODUODENOSCOPY with biopsies  Pre-op Diagnosis: Upper GI bleeding [K92.2]  Post-op Diagnosis: K92.2 - Upper GI bleeding [ICD-10-CM]      Gross Description       A(1). Stomach, stomach biopsies:  Received in formalin, labeled with the patient's name and stomach biopsies, are five, minute to 0.3 cm, irregular, tan-pink soft tissues.  TE-1C Michael Calzada;av-d      Microscopic Description        Helicobacter pylori immunohistochemical stain is performed with appropriate positive control and is negative for organisms.      Performing Labs       The technical component of this testing was completed at [facility]      Case Images     Hemoglobin    Collection Time: 06/16/23  5:14 PM   Result Value Ref Range    Hemoglobin 8.2 (L) 11.7 - 15.7 g/dL   Hemoglobin    Collection Time: 06/16/23 10:20 PM   Result Value Ref Range    Hemoglobin 7.9 (L) 11.7 - 15.7 g/dL   Hemoglobin    Collection Time: 06/17/23  6:08 AM   Result Value Ref Range    Hemoglobin 7.9 (L) 11.7 - 15.7 g/dL           Electronically signed by    Janee Zarate MD                           Sincerely,        FREEMAN Jefferson

## 2023-06-22 NOTE — PROGRESS NOTES
[facility] GERIATRIC SERVICES       Patient Rios Michael  MRN: [id number]        Reason for Visit     Chief Complaint   Patient presents with     Hospital F/U       Code Status     CPR/Full code     Assessment     S/p EGD with finding of nonbleeding gastric ulcer-6/16/23  GI bleed  mechanical fall  Pelvic fracture-LC1 pelvic ring fracture   Left inferior pubic ramus fracture  Left sacral ala fracture  Left puboacetabular fracture   ABLA  Hypothyroidism  HLD  Generalized weakness    Plan     Pt is admitted to TCU for strengthening and rehab.  PT initially admitted with multiple pelvic fracture  Non operative management given  WBAT / walker recommended  Pain control optimized she is on scheduled Tylenol 1 g 3 times daily.  She does have oxycodone available as needed.  Advised icing.  Patient is reporting pain is controlled when she is resting  Given asa 81mg BID for dvt prophylaxis by Ortho but unfortunately not sure what happened she never received her 81 twice daily but continued on 81 mg daily  Readmitted with GI bleed  S/p EGD and finding of gastric ulcer  Discharged on protonix [id number]  Started on po iron today  However she has also been continued on her aspirin 81 mg daily.  I had a detailed discussion with this patient regarding this issue she is somewhat hesitant to stop her aspirin now that she has been continued on it from the hospital.  We decided that we will be checking a hemoglobin on her and if it is dropping she may have to hold her aspirin she is okay with that.  Also reviewed her iron infusion concerns she was not given an iron infusion and she has not been put on an oral iron.  Again advised would like to follow-up on her labs before making the decision  Recommend Fe infusion for anemia in the hospital but that was not done  Patient was advised that she will need to follow-up with her primary care physician if she ends up needing fe infusion  Pain is minimal at rest  Pt is self transferring  Recheck  labs  Continue with PT/OT    History     Patient is a very pleasant 77 year old female who is admitted to TCU  Pt was admitted initially with a mechanical fall  She was noted to have pelvic fractures which are being managed nonoperatively.  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      Past Social History     Reviewed,  reports that she has never smoked. She has never used smokeless tobacco. She reports current alcohol use of about 5.0 standard drinks of alcohol per week. She reports that she does not use drugs.    Family History     Reviewed, and family history includes Alzheimer Disease in her father; Clotting Disorder in her brother; Coronary Artery Disease in her brother and father; Dementia in her mother; Diabetes Type 2  in her brother; Osteoporosis in her mother; Other - See Comments in her brother; Pulmonary Embolism in her son.    Medication List     Current Outpatient Medications   Medication     acetaminophen (TYLENOL) 500 MG tablet     aspirin 81 MG EC tablet     cholecalciferol, vitamin D3, (VITAMIN D3) 1,000 unit capsule      "hypromellose (ARTIFICIAL TEARS) 0.5 % SOLN ophthalmic solution     levothyroxine (SYNTHROID/LEVOTHROID) 75 MCG tablet     multivitamin therapeutic (THERAGRAN) tablet     ondansetron (ZOFRAN ODT) 4 MG ODT tab     oxyCODONE (ROXICODONE) 5 MG tablet     oxyCODONE (ROXICODONE) 5 MG tablet     pantoprazole (PROTONIX) 40 MG EC tablet     sertraline (ZOLOFT) 25 MG tablet     simvastatin (ZOCOR) 40 MG tablet     valACYclovir (VALTREX) 500 MG tablet     calcium citrate-vitamin D3 (CITRACAL + D) 315-250 mg-unit per tablet     No current facility-administered medications for this visit.      MED REC REQUIRED  Post Medication Reconciliation Status: discharge medications reconciled and changed, per note/orders       Allergies     Allergies   Allergen Reactions     Estradiol Hives     Evista [Raloxifene] Hives       Review of Systems   A comprehensive review of 14 systems was done. Pertinent findings noted here and in history of present illness. All the rest negative.  Constitutional: Negative.  Negative for fever, chills, she has  activity change, appetite change and fatigue.   HENT: Negative for congestion and facial swelling.    Eyes: Negative for photophobia, redness and visual disturbance.   Respiratory: Negative for cough and chest tightness.    Cardiovascular: Negative for chest pain, palpitations and leg swelling.   Gastrointestinal: Negative for nausea, diarrhea, constipation, blood in stool and abdominal distention.   Genitourinary: Negative.    Musculoskeletal: has minimal pain at rest  She is reporting pain of 5 with ambulation   Skin: Negative.    Neurological: Negative for dizziness, tremors, syncope, weakness, light-headedness and headaches.   Hematological: Does not bruise/bleed easily.   Psychiatric/Behavioral: Negative.        Physical Exam   /78   Pulse 75   Temp 98.7  F (37.1  C)   Resp 16   Ht 1.549 m (5' 1\")   Wt 61.7 kg (136 lb)   SpO2 98%   BMI 25.70 kg/m     GENERAL: no acute distress. " Cooperative in conversation.   HEENT: pupils are equal, round and reactive. Oral mucosa is moist and intact.  cvs s1s2  RESP:Chest symmetric. Regular respiratory rate. No stridor.  ABD: Nondistended, soft.  EXTREMITIES: No lower extremity edema.   NEURO: non focal. Alert and oriented x3.   PSYCH: within normal limits. No depression or anxiety.  SKIN: warm dry intact       Lab Results     Recent Results (from the past 240 hour(s))   Comprehensive metabolic panel    Collection Time: 06/15/23  9:19 PM   Result Value Ref Range    Sodium 136 136 - 145 mmol/L    Potassium 4.2 3.5 - 5.0 mmol/L    Chloride 101 98 - 107 mmol/L    Carbon Dioxide (CO2) 27 22 - 31 mmol/L    Anion Gap 8 5 - 18 mmol/L    Urea Nitrogen 36 (H) 8 - 28 mg/dL    Creatinine 0.82 0.60 - 1.10 mg/dL    Calcium 9.4 8.5 - 10.5 mg/dL    Glucose 149 (H) 70 - 125 mg/dL    Alkaline Phosphatase 65 45 - 120 U/L    AST 33 0 - 40 U/L    ALT 28 0 - 45 U/L    Protein Total 6.6 6.0 - 8.0 g/dL    Albumin 3.3 (L) 3.5 - 5.0 g/dL    Bilirubin Total 0.5 0.0 - 1.0 mg/dL    GFR Estimate 74 >60 mL/min/1.73m2   INR    Collection Time: 06/15/23  9:19 PM   Result Value Ref Range    INR 0.99 0.85 - 1.15   Adult Type and Screen    Collection Time: 06/15/23  9:19 PM   Result Value Ref Range    ABO/RH(D) A POS     Antibody Screen Negative Negative    SPECIMEN EXPIRATION DATE [id number]    CBC with platelets and differential    Collection Time: 06/15/23 10:56 PM   Result Value Ref Range    WBC Count 6.7 4.0 - 11.0 10e3/uL    RBC Count 2.52 (L) 3.80 - 5.20 10e6/uL    Hemoglobin 8.2 (L) 11.7 - 15.7 g/dL    Hematocrit 24.6 (L) 35.0 - 47.0 %    MCV 98 78 - 100 fL    MCH 32.5 26.5 - 33.0 pg    MCHC 33.3 31.5 - 36.5 g/dL    RDW 12.7 10.0 - 15.0 %    Platelet Count 177 150 - 450 10e3/uL    % Neutrophils 69 %    % Lymphocytes 16 %    % Monocytes 11 %    % Eosinophils 3 %    % Basophils 1 %    % Immature Granulocytes 0 %    NRBCs per 100 WBC 0 <1 /100    Absolute Neutrophils 4.7 1.6 - 8.3  10e3/uL    Absolute Lymphocytes 1.1 0.8 - 5.3 10e3/uL    Absolute Monocytes 0.7 0.0 - 1.3 10e3/uL    Absolute Eosinophils 0.2 0.0 - 0.7 10e3/uL    Absolute Basophils 0.0 0.0 - 0.2 10e3/uL    Absolute Immature Granulocytes 0.0 <=0.4 10e3/uL    Absolute NRBCs 0.0 10e3/uL   Comprehensive metabolic panel    Collection Time: 06/16/23  6:34 AM   Result Value Ref Range    Sodium 139 136 - 145 mmol/L    Potassium 4.0 3.5 - 5.0 mmol/L    Chloride 108 (H) 98 - 107 mmol/L    Carbon Dioxide (CO2) 25 22 - 31 mmol/L    Anion Gap 6 5 - 18 mmol/L    Urea Nitrogen 25 8 - 28 mg/dL    Creatinine 0.72 0.60 - 1.10 mg/dL    Calcium 8.5 8.5 - 10.5 mg/dL    Glucose 97 70 - 125 mg/dL    Alkaline Phosphatase 54 45 - 120 U/L    AST 25 0 - 40 U/L    ALT 22 0 - 45 U/L    Protein Total 5.4 (L) 6.0 - 8.0 g/dL    Albumin 2.8 (L) 3.5 - 5.0 g/dL    Bilirubin Total 0.4 0.0 - 1.0 mg/dL    GFR Estimate 86 >60 mL/min/1.73m2   CBC with platelets and differential    Collection Time: 06/16/23  6:34 AM   Result Value Ref Range    WBC Count 5.1 4.0 - 11.0 10e3/uL    RBC Count 2.42 (L) 3.80 - 5.20 10e6/uL    Hemoglobin 7.8 (L) 11.7 - 15.7 g/dL    Hematocrit 23.6 (L) 35.0 - 47.0 %    MCV 98 78 - 100 fL    MCH 32.2 26.5 - 33.0 pg    MCHC 33.1 31.5 - 36.5 g/dL    RDW 12.7 10.0 - 15.0 %    Platelet Count 171 150 - 450 10e3/uL    % Neutrophils 57 %    % Lymphocytes 24 %    % Monocytes 12 %    % Eosinophils 6 %    % Basophils 1 %    % Immature Granulocytes 0 %    NRBCs per 100 WBC 0 <1 /100    Absolute Neutrophils 2.9 1.6 - 8.3 10e3/uL    Absolute Lymphocytes 1.2 0.8 - 5.3 10e3/uL    Absolute Monocytes 0.6 0.0 - 1.3 10e3/uL    Absolute Eosinophils 0.3 0.0 - 0.7 10e3/uL    Absolute Basophils 0.0 0.0 - 0.2 10e3/uL    Absolute Immature Granulocytes 0.0 <=0.4 10e3/uL    Absolute NRBCs 0.0 10e3/uL   UPPER GI ENDOSCOPY    Collection Time: 06/16/23 11:02 AM   Result Value Ref Range    Upper GI Endoscopy       [facility]  [address] [place] [address]  _______________________________________________________________________________  Patient Name: Rios Michael            Procedure Date: 6/16/2023 11:02 AM  MRN: [id number]                       Account Number: [id number]  YOB: 1945             Admit Type: Outpatient  Age: 77                               Room: [place] [address]  Note Status: Finalized                Attending MD: AIDA WHITEHEAD MD,   Total Sedation Time:                  Instrument Name: EGD Scope 2081  _______________________________________________________________________________     Procedure:             Upper GI endoscopy  Indications:           Melena  Providers:             AIDA WHITEHEAD MD  Referring MD:            Medicines:             Propofol per Anesthesia  Complications:         No immediate complications.  _______________________________________________________________________________  Procedure:             Pre-A nesthesia Assessment:                         - Prior to the procedure, a History and Physical was                          performed, and patient medications and allergies were                          reviewed. The patient is competent. The risks and                          benefits of the procedure and the sedation options and                          risks were discussed with the patient. All questions                          were answered and informed consent was obtained.                          Patient identification and proposed procedure were                          verified by the physician in the pre-procedure area.                          Prophylactic Antibiotics: The patient does not require                          prophylactic antibiotics. Prior Anticoagulants: The                          patient has taken no anticoagulant or antiplatelet                          agents. ASA Grade Assessment: II - A patient with mild                          systemic disease. After   reviewing the risks and                          benefits, the patient was deemed in satisfactory                          condition to undergo the procedure. The anesthesia                          plan was to use moderate sedation / analgesia                          (conscious sedation). Immediately prior to                          administration of medications, the patient was                          re-assessed for adequacy to receive sedatives. The                          heart rate, respiratory rate, oxygen saturations,                          blood pressure, adequacy of pulmonary ventilation, and                          response to care were monitored throughout the                          procedure. The physical status of the patient was                          re-assessed after the procedure.                         After obtaining informed consent, the endoscope was                          passed under direct vision. Throughout the procedure,                           the patient's blood pressure, pulse, and oxygen                          saturations were monitored continuously. The endoscope                          was introduced through the mouth, and advanced to the                          second part of duodenum. The upper GI endoscopy was                          accomplished without difficulty. The patient tolerated                          the procedure well.                                                                                   Findings:       The examined esophagus was normal.       One non-bleeding cratered gastric ulcer with pigmented material was        found in the gastric antrum. The lesion was 10 mm in largest dimension.        Biopsies were taken with a cold forceps for Helicobacter pylori testing.       The examined duodenum was normal.                                                                                   Moderate Sedation:       Moderate (conscious)  sedation was administered by the nurse and        super vised by the endoscopist. The patient's oxygen saturation, heart        rate, blood pressure and response to care were monitored.  Impression:            - Normal esophagus.                         - Non-bleeding gastric ulcer with pigmented material.                          Biopsied.                         - Normal examined duodenum.  Recommendation:        - Await pathology results.                         - Repeat upper endoscopy in 2 months to check healing.                         -Low risk for rebleeding (10%). Can discharge in AM if                          hgb stable.                                                                                       ____________________  KANA WHITEHEAD MD  6/16/2023 11:17:36 AM  I was physically present for the entire viewing portion of the exam.  __________________________  Signature of teaching physician  [id number] MD VENTURA  Number of Addenda: 0    Note Initiated On: 6/16/2023 11:02 AM  Scope In: 11:08:58 AM  Scope Out: 11:12:12 A      Surgical Pathology Exam    Collection Time: 06/16/23 11:10 AM   Result Value Ref Range    Case Report       Surgical Pathology Report                         Case: [id number]                                  Authorizing Provider:  Kana Whitehead MD      Collected:           06/16/2023 11:10 AM          Ordering Location:     [facility]          Received:            06/16/2023 11:31 AM                                 [facility] OR                                                   Pathologist:           Ole Andre MD                                                      Specimen:    Stomach, stomach biopsies                                                                  Final Diagnosis       STOMACH, BIOPSY:   -ANTRAL AND ANTRAL FUNDIC TRANSITIONAL ZONE MUCOSA WITH MILD CHRONIC GASTRITIS  -NO INTESTINAL METAPLASIA  -NO HELICOBACTER PYLORI       Clinical Information       Procedure: ESOPHAGOGASTRODUODENOSCOPY with biopsies  Pre-op Diagnosis: Upper GI bleeding [K92.2]  Post-op Diagnosis: K92.2 - Upper GI bleeding [ICD-10-CM]      Gross Description       A(1). Stomach, stomach biopsies:  Received in formalin, labeled with the patient's name and stomach biopsies, are five, minute to 0.3 cm, irregular, tan-pink soft tissues.  TE-1C Michael Calzada;av-d      Microscopic Description        Helicobacter pylori immunohistochemical stain is performed with appropriate positive control and is negative for organisms.      Performing Labs       The technical component of this testing was completed at [facility]      Case Images     Hemoglobin    Collection Time: 06/16/23  5:14 PM   Result Value Ref Range    Hemoglobin 8.2 (L) 11.7 - 15.7 g/dL   Hemoglobin    Collection Time: 06/16/23 10:20 PM   Result Value Ref Range    Hemoglobin 7.9 (L) 11.7 - 15.7 g/dL   Hemoglobin    Collection Time: 06/17/23  6:08 AM   Result Value Ref Range    Hemoglobin 7.9 (L) 11.7 - 15.7 g/dL           Electronically signed by    Janee Zarate MD

## 2023-06-23 ENCOUNTER — TELEPHONE (OUTPATIENT)
Dept: GERIATRICS | Facility: CLINIC | Age: 78
End: 2023-06-23

## 2023-06-23 ENCOUNTER — DOCUMENTATION ONLY (OUTPATIENT)
Dept: HOME HEALTH SERVICES | Facility: CLINIC | Age: 78
End: 2023-06-23

## 2023-06-23 DIAGNOSIS — S32.82XA MULTIPLE CLOSED FRACTURES OF PELVIS WITHOUT DISRUPTION OF PELVIC RING, INITIAL ENCOUNTER (H): Primary | ICD-10-CM

## 2023-06-23 LAB
ANION GAP SERPL CALCULATED.3IONS-SCNC: 12 MMOL/L (ref 7–15)
BASOPHILS # BLD AUTO: 0.1 10E3/UL (ref 0–0.2)
BASOPHILS NFR BLD AUTO: 1 %
BUN SERPL-MCNC: 27 MG/DL (ref 8–23)
CALCIUM SERPL-MCNC: 9.7 MG/DL (ref 8.8–10.2)
CHLORIDE SERPL-SCNC: 102 MMOL/L (ref 98–107)
CREAT SERPL-MCNC: 0.95 MG/DL (ref 0.51–0.95)
DEPRECATED HCO3 PLAS-SCNC: 25 MMOL/L (ref 22–29)
EOSINOPHIL # BLD AUTO: 0.4 10E3/UL (ref 0–0.7)
EOSINOPHIL NFR BLD AUTO: 8 %
ERYTHROCYTE [DISTWIDTH] IN BLOOD BY AUTOMATED COUNT: 13.3 % (ref 10–15)
GFR SERPL CREATININE-BSD FRML MDRD: 62 ML/MIN/1.73M2
GLUCOSE SERPL-MCNC: 94 MG/DL (ref 70–99)
HCT VFR BLD AUTO: 27.2 % (ref 35–47)
HGB BLD-MCNC: 8.5 G/DL (ref 11.7–15.7)
IMM GRANULOCYTES # BLD: 0 10E3/UL
IMM GRANULOCYTES NFR BLD: 1 %
LYMPHOCYTES # BLD AUTO: 1.4 10E3/UL (ref 0.8–5.3)
LYMPHOCYTES NFR BLD AUTO: 25 %
MAGNESIUM SERPL-MCNC: 2.1 MG/DL (ref 1.7–2.3)
MCH RBC QN AUTO: 32.4 PG (ref 26.5–33)
MCHC RBC AUTO-ENTMCNC: 31.3 G/DL (ref 31.5–36.5)
MCV RBC AUTO: 104 FL (ref 78–100)
MONOCYTES # BLD AUTO: 0.8 10E3/UL (ref 0–1.3)
MONOCYTES NFR BLD AUTO: 14 %
NEUTROPHILS # BLD AUTO: 2.9 10E3/UL (ref 1.6–8.3)
NEUTROPHILS NFR BLD AUTO: 51 %
NRBC # BLD AUTO: 0 10E3/UL
NRBC BLD AUTO-RTO: 0 /100
PLATELET # BLD AUTO: 411 10E3/UL (ref 150–450)
POTASSIUM SERPL-SCNC: 4.5 MMOL/L (ref 3.4–5.3)
RBC # BLD AUTO: 2.62 10E6/UL (ref 3.8–5.2)
SODIUM SERPL-SCNC: 139 MMOL/L (ref 136–145)
WBC # BLD AUTO: 5.6 10E3/UL (ref 4–11)

## 2023-06-23 PROCEDURE — P9604 ONE-WAY ALLOW PRORATED TRIP: HCPCS | Mod: ORL | Performed by: NURSE PRACTITIONER

## 2023-06-23 PROCEDURE — 83735 ASSAY OF MAGNESIUM: CPT | Mod: ORL | Performed by: NURSE PRACTITIONER

## 2023-06-23 PROCEDURE — 85025 COMPLETE CBC W/AUTO DIFF WBC: CPT | Mod: ORL | Performed by: NURSE PRACTITIONER

## 2023-06-23 PROCEDURE — 36415 COLL VENOUS BLD VENIPUNCTURE: CPT | Mod: ORL | Performed by: NURSE PRACTITIONER

## 2023-06-23 PROCEDURE — 80048 BASIC METABOLIC PNL TOTAL CA: CPT | Mod: ORL | Performed by: NURSE PRACTITIONER

## 2023-06-23 NOTE — TELEPHONE ENCOUNTER
[facility] Geriatrics Lab Note     Provider: Janee Zarate MD  Facility: [facility]  Facility Type:  TCU    Allergies   Allergen Reactions     Estradiol Hives     Evista [Raloxifene] Hives       Labs Reviewed by provider: Heme 1, BMP, Mg     Verbal Order/Direction given by Provider: No new orders.      Provider giving Order:  Janee Zarate MD    Verbal Order given to: Afa([phone])    Vj Anthony RN

## 2023-06-25 ENCOUNTER — LAB REQUISITION (OUTPATIENT)
Dept: LAB | Facility: CLINIC | Age: 78
End: 2023-06-25
Payer: COMMERCIAL

## 2023-06-25 DIAGNOSIS — Z51.81 ENCOUNTER FOR THERAPEUTIC DRUG LEVEL MONITORING: ICD-10-CM

## 2023-06-26 ENCOUNTER — TELEPHONE (OUTPATIENT)
Dept: GERIATRICS | Facility: CLINIC | Age: 78
End: 2023-06-26

## 2023-06-26 LAB
ANION GAP SERPL CALCULATED.3IONS-SCNC: 9 MMOL/L (ref 7–15)
BASOPHILS # BLD AUTO: 0.1 10E3/UL (ref 0–0.2)
BASOPHILS NFR BLD AUTO: 1 %
BUN SERPL-MCNC: 23.3 MG/DL (ref 8–23)
CALCIUM SERPL-MCNC: 9.8 MG/DL (ref 8.8–10.2)
CHLORIDE SERPL-SCNC: 102 MMOL/L (ref 98–107)
CREAT SERPL-MCNC: 0.89 MG/DL (ref 0.51–0.95)
DEPRECATED HCO3 PLAS-SCNC: 27 MMOL/L (ref 22–29)
EOSINOPHIL # BLD AUTO: 0.6 10E3/UL (ref 0–0.7)
EOSINOPHIL NFR BLD AUTO: 10 %
ERYTHROCYTE [DISTWIDTH] IN BLOOD BY AUTOMATED COUNT: 13.2 % (ref 10–15)
GFR SERPL CREATININE-BSD FRML MDRD: 67 ML/MIN/1.73M2
GLUCOSE SERPL-MCNC: 100 MG/DL (ref 70–99)
HCT VFR BLD AUTO: 28.3 % (ref 35–47)
HGB BLD-MCNC: 9 G/DL (ref 11.7–15.7)
IMM GRANULOCYTES # BLD: 0 10E3/UL
IMM GRANULOCYTES NFR BLD: 1 %
LYMPHOCYTES # BLD AUTO: 1.2 10E3/UL (ref 0.8–5.3)
LYMPHOCYTES NFR BLD AUTO: 22 %
MAGNESIUM SERPL-MCNC: 2 MG/DL (ref 1.7–2.3)
MCH RBC QN AUTO: 32.4 PG (ref 26.5–33)
MCHC RBC AUTO-ENTMCNC: 31.8 G/DL (ref 31.5–36.5)
MCV RBC AUTO: 102 FL (ref 78–100)
MONOCYTES # BLD AUTO: 0.8 10E3/UL (ref 0–1.3)
MONOCYTES NFR BLD AUTO: 14 %
NEUTROPHILS # BLD AUTO: 2.9 10E3/UL (ref 1.6–8.3)
NEUTROPHILS NFR BLD AUTO: 52 %
NRBC # BLD AUTO: 0 10E3/UL
NRBC BLD AUTO-RTO: 0 /100
PLATELET # BLD AUTO: 453 10E3/UL (ref 150–450)
POTASSIUM SERPL-SCNC: 4.5 MMOL/L (ref 3.4–5.3)
RBC # BLD AUTO: 2.78 10E6/UL (ref 3.8–5.2)
SODIUM SERPL-SCNC: 138 MMOL/L (ref 136–145)
WBC # BLD AUTO: 5.6 10E3/UL (ref 4–11)

## 2023-06-26 PROCEDURE — 85025 COMPLETE CBC W/AUTO DIFF WBC: CPT | Mod: ORL | Performed by: FAMILY MEDICINE

## 2023-06-26 PROCEDURE — 80048 BASIC METABOLIC PNL TOTAL CA: CPT | Mod: ORL | Performed by: FAMILY MEDICINE

## 2023-06-26 PROCEDURE — 83735 ASSAY OF MAGNESIUM: CPT | Mod: ORL | Performed by: FAMILY MEDICINE

## 2023-06-26 PROCEDURE — P9604 ONE-WAY ALLOW PRORATED TRIP: HCPCS | Mod: ORL | Performed by: FAMILY MEDICINE

## 2023-06-26 PROCEDURE — 36415 COLL VENOUS BLD VENIPUNCTURE: CPT | Mod: ORL | Performed by: FAMILY MEDICINE

## 2023-06-26 NOTE — TELEPHONE ENCOUNTER
[facility] Geriatrics Lab Note     Provider: HEENA Head  Facility: [facility]  Facility Type:  TCU    Allergies   Allergen Reactions     Estradiol Hives     Evista [Raloxifene] Hives       Labs Reviewed by provider: Heme 1, BMP, Mg     Verbal Order/Direction given by Provider: No new orders.      Provider giving Order:  HEENA Head    Verbal Order given to: Yadi([phone])    Vj Anthony RN

## 2023-06-27 VITALS
OXYGEN SATURATION: 98 % | RESPIRATION RATE: 18 BRPM | DIASTOLIC BLOOD PRESSURE: 63 MMHG | TEMPERATURE: 97.7 F | HEART RATE: 76 BPM | BODY MASS INDEX: 25.47 KG/M2 | SYSTOLIC BLOOD PRESSURE: 137 MMHG | WEIGHT: 134.8 LBS

## 2023-06-28 ENCOUNTER — DISCHARGE SUMMARY NURSING HOME (OUTPATIENT)
Dept: GERIATRICS | Facility: CLINIC | Age: 78
End: 2023-06-28
Payer: COMMERCIAL

## 2023-06-28 DIAGNOSIS — R52 PAIN MANAGEMENT: ICD-10-CM

## 2023-06-28 DIAGNOSIS — S32.82XA MULTIPLE CLOSED FRACTURES OF PELVIS WITHOUT DISRUPTION OF PELVIC RING, INITIAL ENCOUNTER (H): ICD-10-CM

## 2023-06-28 DIAGNOSIS — K92.2 UPPER GI BLEEDING: Primary | ICD-10-CM

## 2023-06-28 DIAGNOSIS — R53.81 PHYSICAL DECONDITIONING: ICD-10-CM

## 2023-06-28 PROCEDURE — 99316 NF DSCHRG MGMT 30 MIN+: CPT | Performed by: NURSE PRACTITIONER

## 2023-06-28 NOTE — PROGRESS NOTES
Critical access hospital GERIATRIC SERVICES  Chief Complaint   Patient presents with     Discharge Summary [facility] Medical Record Number:  [id number]  Place of Service where encounter took place:  [facility] ([facility]) [[id number]]  Code Status:  Full Code     HISTORY:      HPI:  Rios Michael  is 77 year old (1945) undergoing physical and occupational therapy. She presented to [facility] on 6/15/2023 after presenting with melena.  She is with past medical history coronary artery disease, depression, disease of thyroid gland, hypertension, arthritis. Excerpted from records. She also sustained a mechanical fall on 6/10 resulting in 3 nonoperative pelvic fractures.  2 days after the fall She ended up with melena, coffee ground emesis.  Pts hemoglobin on admission was 8.2 Which drifted to 7.8.  She was only on baby aspirin for previous CAD issues. (no recent stenting).The GI team consulted on the case.  On 6/16 she had an EGD that showed a nonbleeding  Gastric ulcer with some pigmentation.  She was put on once daily PPI. She did not have Further melena. She did not require blood transfusion. She tolerated orals. The ulcer was Biopsied.  Recommendations are for follow up EGD in 8/2023 to assess for healing.  She discharged to this facility on 6/17/2023 for further rehabilitation due to her recent pelvic fractures.    Today she is seen to review vital signs, labs, routine visit establish care and a face-to-face for discharge.  She will discharge to home on 6/30/2023 with current medications and treatments.  She will do outpatient physical therapy.  She denies any bleeding, denied chest pain shortness of breath cough congestion constipation or diarrhea.  She denied pain.  She will follow-up with Ortho on 7/5/2023.  And also tells writer she will follow-up with GI for an endoscopy on 8/29/2023.  Hemoglobin stable at 9.0 which is up from 8.5.    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    FAMILY HISTORY: family history includes Alzheimer Disease in her father; Clotting Disorder in her brother; Coronary Artery Disease in her brother and father; Dementia in her mother; Diabetes Type 2  in her brother; Osteoporosis in her mother; Other - See Comments in her brother; Pulmonary Embolism in her son.    SOCIAL HISTORY:  reports that she has never smoked. She has never used smokeless tobacco. She reports current alcohol use of about 5.0 standard drinks of alcohol per week. She reports that she does not use drugs.    ROS:  Constitutional: Negative for activity change, appetite change, fatigue and fever.   HENT: Negative for congestion.    Respiratory: Negative for cough, shortness of breath and wheezing.    Cardiovascular: Negative for chest pain and leg swelling.   Gastrointestinal: Negative for abdominal distention, abdominal recent history of GI bleed will follow-up with GI on 8/29/2023 pain, constipation, diarrhea and nausea.   Genitourinary: Negative for dysuria.   Musculoskeletal: Negative for arthralgia. Negative for back pain.  Pelvic fractures  Skin: Negative for color change and wound.   Neurological: Negative for dizziness.    Psychiatric/Behavioral: Negative for agitation, behavioral problems and confusion.     Physical Exam:  Constitutional:       Appearance: Patient is well-developed.   HENT:      Head: Normocephalic.   Eyes:      Conjunctiva/sclera: Conjunctivae normal.   Neck:      Musculoskeletal: Normal range of motion.   Cardiovascular:      Rate and Rhythm: Normal rate and regular rhythm.      Heart sounds: Normal heart sounds. No murmur.   Pulmonary:      Effort: No respiratory distress.      Breath sounds: Normal breath sounds. No wheezing or rales.   Abdominal:      General: Bowel sounds are normal. There is no distension.      Palpations: Abdomen is soft.      Tenderness: There is no abdominal tenderness.   Musculoskeletal:       Normal range of motion.     Skin:General:        Skin is warm.   Neurological:         Mental Status: Patient is alert and oriented to person, place, and time.   Psychiatric:         Behavior: Behavior normal.     Vitals:/63   Pulse 76   Temp 97.7  F (36.5  C)   Resp 18   Wt 61.1 kg (134 lb 12.8 oz)   SpO2 98%   BMI 25.47 kg/m   and Body mass index is 25.47 kg/m .    Lab/Diagnostic data:   Recent Results (from the past 240 hour(s))   Basic metabolic panel    Collection Time: 06/23/23  6:31 AM   Result Value Ref Range    Sodium 139 136 - 145 mmol/L    Potassium 4.5 3.4 - 5.3 mmol/L    Chloride 102 98 - 107 mmol/L    Carbon Dioxide (CO2) 25 22 - 29 mmol/L    Anion Gap 12 7 - 15 mmol/L    Urea Nitrogen 27.0 (H) 8.0 - 23.0 mg/dL    Creatinine 0.95 0.51 - 0.95 mg/dL    Calcium 9.7 8.8 - 10.2 mg/dL    Glucose 94 70 - 99 mg/dL    GFR Estimate 62 >60 mL/min/1.73m2   Magnesium    Collection Time: 06/23/23  6:31 AM   Result Value Ref Range    Magnesium 2.1 1.7 - 2.3 mg/dL   CBC with platelets and differential    Collection Time: 06/23/23  6:31 AM   Result Value Ref Range    WBC Count 5.6 4.0 - 11.0 10e3/uL    RBC Count 2.62 (L) 3.80 - 5.20 10e6/uL    Hemoglobin 8.5 (L) 11.7 - 15.7 g/dL     Hematocrit 27.2 (L) 35.0 - 47.0 %     (H) 78 - 100 fL    MCH 32.4 26.5 - 33.0 pg    MCHC 31.3 (L) 31.5 - 36.5 g/dL    RDW 13.3 10.0 - 15.0 %    Platelet Count 411 150 - 450 10e3/uL    % Neutrophils 51 %    % Lymphocytes 25 %    % Monocytes 14 %    % Eosinophils 8 %    % Basophils 1 %    % Immature Granulocytes 1 %    NRBCs per 100 WBC 0 <1 /100    Absolute Neutrophils 2.9 1.6 - 8.3 10e3/uL    Absolute Lymphocytes 1.4 0.8 - 5.3 10e3/uL    Absolute Monocytes 0.8 0.0 - 1.3 10e3/uL    Absolute Eosinophils 0.4 0.0 - 0.7 10e3/uL    Absolute Basophils 0.1 0.0 - 0.2 10e3/uL    Absolute Immature Granulocytes 0.0 <=0.4 10e3/uL    Absolute NRBCs 0.0 10e3/uL   Basic metabolic panel    Collection Time: 06/26/23  6:26 AM   Result Value Ref Range    Sodium 138 136 - 145 mmol/L    Potassium 4.5 3.4 - 5.3 mmol/L    Chloride 102 98 - 107 mmol/L    Carbon Dioxide (CO2) 27 22 - 29 mmol/L    Anion Gap 9 7 - 15 mmol/L    Urea Nitrogen 23.3 (H) 8.0 - 23.0 mg/dL    Creatinine 0.89 0.51 - 0.95 mg/dL    Calcium 9.8 8.8 - 10.2 mg/dL    Glucose 100 (H) 70 - 99 mg/dL    GFR Estimate 67 >60 mL/min/1.73m2   Magnesium    Collection Time: 06/26/23  6:26 AM   Result Value Ref Range    Magnesium 2.0 1.7 - 2.3 mg/dL   CBC with platelets and differential    Collection Time: 06/26/23  6:26 AM   Result Value Ref Range    WBC Count 5.6 4.0 - 11.0 10e3/uL    RBC Count 2.78 (L) 3.80 - 5.20 10e6/uL    Hemoglobin 9.0 (L) 11.7 - 15.7 g/dL    Hematocrit 28.3 (L) 35.0 - 47.0 %     (H) 78 - 100 fL    MCH 32.4 26.5 - 33.0 pg    MCHC 31.8 31.5 - 36.5 g/dL    RDW 13.2 10.0 - 15.0 %    Platelet Count 453 (H) 150 - 450 10e3/uL    % Neutrophils 52 %    % Lymphocytes 22 %    % Monocytes 14 %    % Eosinophils 10 %    % Basophils 1 %    % Immature Granulocytes 1 %    NRBCs per 100 WBC 0 <1 /100    Absolute Neutrophils 2.9 1.6 - 8.3 10e3/uL    Absolute Lymphocytes 1.2 0.8 - 5.3 10e3/uL    Absolute Monocytes 0.8 0.0 - 1.3 10e3/uL    Absolute Eosinophils 0.6  0.0 - 0.7 10e3/uL    Absolute Basophils 0.1 0.0 - 0.2 10e3/uL    Absolute Immature Granulocytes 0.0 <=0.4 10e3/uL    Absolute NRBCs 0.0 10e3/uL       MEDICATIONS:     Review of your medicines          Accurate as of June 27, 2023 10:44 PM. If you have any questions, ask your nurse or doctor.            CONTINUE these medicines which have NOT CHANGED      Dose / Directions   acetaminophen 500 MG tablet  Commonly known as: TYLENOL  Used for: Multiple closed fractures of pelvis without disruption of pelvic ring, initial encounter (H)      Dose: 1,000 mg  Take 2 tablets (1,000 mg) by mouth every 8 hours  Quantity: 30 tablet  Refills: 0     aspirin 81 MG EC tablet  Commonly known as: ASA  Used for: Coronary artery disease involving native coronary artery of native heart without angina pectoris      Dose: 81 mg  [ASPIRIN 81 MG EC TABLET] Take 1 tablet (81 mg total) by mouth daily.  Refills: 0     calcium citrate-vitamin D 315-200 MG-UNIT Tabs per tablet  Commonly known as: CITRACAL      Dose: 1 tablet  Take 1 tablet by mouth At Bedtime  Refills: 0     cholecalciferol 25 mcg (1000 units) capsule  Commonly known as: VITAMIN D3      Dose: 1,000 Units  Take 1,000 Units by mouth At Bedtime  Refills: 0     ferrous sulfate 325 (65 Fe) MG EC tablet  Commonly known as: FE TABS      Dose: 325 mg  Take 325 mg by mouth daily  Refills: 0     hypromellose 0.5 % Soln ophthalmic solution  Commonly known as: ARTIFICIAL TEARS      Dose: 1 drop  Place 1 drop into both eyes every hour as needed for dry eyes  Refills: 0     levothyroxine 75 MCG tablet  Commonly known as: SYNTHROID/LEVOTHROID  Used for: Acquired hypothyroidism      Dose: 75 mcg  Take 1 tablet (75 mcg) by mouth daily  Quantity: 90 tablet  Refills: 3     ondansetron 4 MG ODT tab  Commonly known as: ZOFRAN ODT  Used for: Upper GI bleeding      Dose: 4 mg  Take 1 tablet (4 mg) by mouth every 6 hours as needed for nausea or vomiting  Quantity: 12 tablet  Refills: 0     oxyCODONE 5  MG tablet  Commonly known as: ROXICODONE  Indication: Acute Pain  Used for: Closed nondisplaced fracture of pelvis with routine healing, unspecified part of pelvis, subsequent encounter      Dose: 2.5-5 mg  Take 0.5-1 tablets (2.5-5 mg) by mouth every 6 hours as needed for moderate pain or severe pain (2.5 mgs tab for mod pain, 5 mg tabs for severe pain)  Quantity: 20 tablet  Refills: 0     pantoprazole 40 MG EC tablet  Commonly known as: PROTONIX  Used for: Upper GI bleeding      Dose: 40 mg  Take 1 tablet (40 mg) by mouth every morning (before breakfast)  Quantity: 30 tablet  Refills: 0     sertraline 25 MG tablet  Commonly known as: ZOLOFT  Used for: Anxiety      [SERTRALINE (ZOLOFT) 25 MG TABLET] TAKE 3 TABLETS BY MOUTH EVERY DAY  Quantity: 270 tablet  Refills: 3     simvastatin 40 MG tablet  Commonly known as: ZOCOR  Used for: Atherosclerosis of native coronary artery of native heart without angina pectoris, Mixed hyperlipidemia      TAKE 1 TABLET BY MOUTH EVERYDAY AT BEDTIME  Quantity: 90 tablet  Refills: 3     THERAPEUTIC MULTIVITAMIN PO  Used for: Unspecified essential hypertension      Dose: 1 tablet  [MULTIVITAMIN THERAPEUTIC (THERAGRAN) TABLET] Take 1 tablet by mouth daily.  Quantity: 90 tablet  Refills: 1     valACYclovir 500 MG tablet  Commonly known as: VALTREX      Dose: 500 mg  Take 500 mg by mouth 2 times daily as needed (cold sores)  Refills: 0            ASSESSMENT/PLAN  Encounter Diagnoses   Name Primary?     Upper GI bleeding Yes     Multiple closed fractures of pelvis without disruption of pelvic ring, initial encounter (H)      Physical deconditioning      Pain management      Upper GI bleed resolved continue Protonix 40 mg daily hemoglobin stable at 9.0 up from 8.5.    Multiple pelvic fractures follow-up with orthopedics pain management    Physical deconditioning she will do outpatient physical therapy    Pain management as needed oxycodone scheduled extra strength Tylenol     HDL continue  simvastatin    Anxiety on Zoloft 75 mg daily    Nausea as needed Zofran    Hypothyroidism continue levothyroxine TSH on 1/5/2023 was 1.35        DISCHARGE PLAN/FACE TO FACE:  I certify that services are/were furnished while this patient was under the care of a physician and that a physician or an allowed non-physician practitioner (NPP), had a face-to-face encounter that meets the physician face-to-face encounter requirements. The encounter was in whole, or in part, related to the primary reason for home health. The patient is confined to his/her home and needs intermittent skilled nursing, physical therapy, speech-language pathology, or the continued need for occupational therapy. A plan of care has been established by a physician and is periodically reviewed by a physician.  Date of Face-to-Face Encounter: 6/28/2023    I certify that, based on my findings, the following services are medically necessary home health services: Outpatient physical therapy    My clinical findings support the need for the above skilled services because: She will continue outpatient physical therapy for continued strength and endurance    This patient is homebound because: Not applicable    The patient is, or has been, under my care and I have initiated the establishment of the plan of care. This patient will be followed by a physician who will periodically review the plan of care.    Schedule follow up visit with primary care provider within 7 days to reestablish care.    Electronically signed by: Fatuma Kumar CNP

## 2023-07-05 ENCOUNTER — TRANSFERRED RECORDS (OUTPATIENT)
Dept: HEALTH INFORMATION MANAGEMENT | Facility: CLINIC | Age: 78
End: 2023-07-05
Payer: COMMERCIAL

## 2023-07-07 ENCOUNTER — TELEPHONE (OUTPATIENT)
Dept: PEDIATRICS | Facility: CLINIC | Age: 78
End: 2023-07-07
Payer: COMMERCIAL

## 2023-07-07 NOTE — TELEPHONE ENCOUNTER
Received call from pt   She was just discharged from TCU  She would like to see if she can have home PT    Routing SAMANTHAW    Yaakov Wilson RN on 7/7/2023 at 12:28 PM

## 2023-07-09 ENCOUNTER — MYC MEDICAL ADVICE (OUTPATIENT)
Dept: PEDIATRICS | Facility: CLINIC | Age: 78
End: 2023-07-09
Payer: COMMERCIAL

## 2023-07-09 DIAGNOSIS — K59.03 DRUG-INDUCED CONSTIPATION: Primary | ICD-10-CM

## 2023-07-10 RX ORDER — SENNOSIDES 8.6 MG
2 TABLET ORAL DAILY
Qty: 60 TABLET | Refills: 1 | Status: SHIPPED | OUTPATIENT
Start: 2023-07-10 | End: 2023-12-15

## 2023-07-10 RX ORDER — POLYETHYLENE GLYCOL 3350 17 G/17G
17 POWDER, FOR SOLUTION ORAL DAILY PRN
Qty: 510 G | Refills: 0 | Status: SHIPPED | OUTPATIENT
Start: 2023-07-10 | End: 2023-12-15

## 2023-07-10 NOTE — TELEPHONE ENCOUNTER
Called patient and verified pharmacy.  Patient states that she had a very small BM this morning.   Reviewed instructions below with patient. She verbally understood.     TEDDY Cole on 7/10/2023 at 12:55 PM

## 2023-07-10 NOTE — TELEPHONE ENCOUNTER
Patient sent my chart message requesting recommendations for constipation related to Oxycodone for pelvis fracture. She has an appointment with you on Friday.     Do you want a e-visit for this? Please let us know. Thank you.    TEDDY Cole on 7/10/2023 at 9:25 AM

## 2023-07-10 NOTE — TELEPHONE ENCOUNTER
Would take senna 2 tabs daily while on oxycodone. If too much stooling would back off to 1.     If no stool in 24 hours (given how longs she's already gone) would add miralax - 1 capful daily and then can go up on down on dose depending on how she does. Max 2 caps daily.     Goal one soft stool every day/over other day.     Needs pharmacy info and then scripts can go.     EUvaldo Doherty MD  Internal Medicine-Pediatrics

## 2023-07-11 ENCOUNTER — TELEPHONE (OUTPATIENT)
Dept: PEDIATRICS | Facility: CLINIC | Age: 78
End: 2023-07-11
Payer: COMMERCIAL

## 2023-07-11 DIAGNOSIS — S32.82XD MULTIPLE CLOSED FRACTURES OF PELVIS WITHOUT DISRUPTION OF PELVIC RING WITH ROUTINE HEALING, SUBSEQUENT ENCOUNTER: Primary | ICD-10-CM

## 2023-07-11 NOTE — TELEPHONE ENCOUNTER
Maite from [facility] called and requesting orders.     Needs new order for in home PT/OT originals were not for in home.     Call back number: [phone]    Ara Rene [facility]  [phone]

## 2023-07-12 ENCOUNTER — MYC REFILL (OUTPATIENT)
Dept: PEDIATRICS | Facility: CLINIC | Age: 78
End: 2023-07-12
Payer: COMMERCIAL

## 2023-07-12 DIAGNOSIS — K92.2 UPPER GI BLEEDING: ICD-10-CM

## 2023-07-13 ENCOUNTER — MYC REFILL (OUTPATIENT)
Dept: PEDIATRICS | Facility: CLINIC | Age: 78
End: 2023-07-13
Payer: COMMERCIAL

## 2023-07-13 DIAGNOSIS — S32.9XXD CLOSED NONDISPLACED FRACTURE OF PELVIS WITH ROUTINE HEALING, UNSPECIFIED PART OF PELVIS, SUBSEQUENT ENCOUNTER: ICD-10-CM

## 2023-07-13 RX ORDER — OXYCODONE HYDROCHLORIDE 5 MG/1
2.5-5 TABLET ORAL EVERY 6 HOURS PRN
Qty: 20 TABLET | Refills: 0 | Status: SHIPPED | OUTPATIENT
Start: 2023-07-13 | End: 2023-12-15

## 2023-07-13 RX ORDER — PANTOPRAZOLE SODIUM 40 MG/1
40 TABLET, DELAYED RELEASE ORAL
Qty: 90 TABLET | Refills: 3 | Status: SHIPPED | OUTPATIENT
Start: 2023-07-13 | End: 2023-12-15

## 2023-07-13 NOTE — TELEPHONE ENCOUNTER
Last prescribed in hospital. Please review and advise. Patient has 2 tablets left. Patient was told she is supposed to take 8 weeks of pantoprazole. Patient was told this after she had endoscopy done at hospital.     Terrence ONOFRE RN 7/13/2023 at 4:29 PM

## 2023-07-14 ENCOUNTER — VIRTUAL VISIT (OUTPATIENT)
Dept: PEDIATRICS | Facility: CLINIC | Age: 78
End: 2023-07-14
Payer: COMMERCIAL

## 2023-07-14 ENCOUNTER — MYC MEDICAL ADVICE (OUTPATIENT)
Dept: PEDIATRICS | Facility: CLINIC | Age: 78
End: 2023-07-14

## 2023-07-14 DIAGNOSIS — S32.82XD MULTIPLE CLOSED FRACTURES OF PELVIS WITHOUT DISRUPTION OF PELVIC RING WITH ROUTINE HEALING, SUBSEQUENT ENCOUNTER: ICD-10-CM

## 2023-07-14 DIAGNOSIS — I25.10 ATHEROSCLEROSIS OF NATIVE CORONARY ARTERY OF NATIVE HEART WITHOUT ANGINA PECTORIS: ICD-10-CM

## 2023-07-14 DIAGNOSIS — K92.2 UPPER GI BLEEDING: ICD-10-CM

## 2023-07-14 DIAGNOSIS — Z09 HOSPITAL DISCHARGE FOLLOW-UP: Primary | ICD-10-CM

## 2023-07-14 PROCEDURE — 99214 OFFICE O/P EST MOD 30 MIN: CPT | Mod: 95 | Performed by: INTERNAL MEDICINE

## 2023-07-14 RX ORDER — FERROUS SULFATE 325(65) MG
325 TABLET, DELAYED RELEASE (ENTERIC COATED) ORAL EVERY OTHER DAY
Qty: 45 TABLET | Refills: 1 | Status: SHIPPED | OUTPATIENT
Start: 2023-07-14 | End: 2023-10-27

## 2023-07-14 NOTE — PATIENT INSTRUCTIONS
Senna while taking the oxycodone to help with constipation. Miralax if it's been > 48 hours. Can back off if having loose stools.     I'll see if my team can with Home Healthcare to make sure they have what they need to get you started with home physical therapy.    Can try waiting more time in between pain medication doses. Goal to be off of oxycodone in the next 2-3 weeks.     Start oral iron supplement.   I would recommend starting an iron supplement. I sent this to your pharmacy or you can take over the counter ferrous sulfate 325 mg daily- it's best absorbed on an empty stomach (a lot of people take it between breakfast and lunch), every OTHER day. However, if you can't remember that just take it in the morning but try to avoid milk products. Iron can cause constipation so make sure to stay hydrated and up your fiber. You can use a little miralax if needed. It takes months to rebuild your iron stores.     Labs early next week (nonfasting)

## 2023-07-14 NOTE — PROGRESS NOTES
Danny is a 77 year old who is being evaluated via a billable telephone visit.      Distant Location (provider location):  On-site    Assessment & Plan       ICD-10-CM    1. Hospital discharge follow-up  Z09       2. Upper GI bleeding  K92.2 Ferritin     ferrous sulfate (FE TABS) 325 (65 Fe) MG EC tablet      3. Atherosclerosis of native coronary artery of native heart without angina pectoris  I25.10       4. Multiple closed fractures of pelvis without disruption of pelvic ring with routine healing, subsequent encounter  S32.82XD         Wellness in January.   Consider IV iron infusions if ferritin super low as already dealing w constipation.   --------------------------  PATIENT INSTRUCTIONS    Patient Instructions   Senna while taking the oxycodone to help with constipation. Miralax if it's been > 48 hours. Can back off if having loose stools.     I'll see if my team can with Home Healthcare to make sure they have what they need to get you started with home physical therapy.    Can try waiting more time in between pain medication doses. Goal to be off of oxycodone in the next 2-3 weeks.     Start oral iron supplement.   I would recommend starting an iron supplement. I sent this to your pharmacy or you can take over the counter ferrous sulfate 325 mg daily- it's best absorbed on an empty stomach (a lot of people take it between breakfast and lunch), every OTHER day. However, if you can't remember that just take it in the morning but try to avoid milk products. Iron can cause constipation so make sure to stay hydrated and up your fiber. You can use a little miralax if needed. It takes months to rebuild your iron stores.     Labs early next week (nonfasting)      --------------------------           MED REC REQUIRED  Post Medication Reconciliation Status: discharge medications reconciled and changed, per note/orders  BMI:   Estimated body mass index is 25.47 kg/m  as calculated from the following:    Height as of  "6/22/23: 1.549 m (5' 1\").    Weight as of 6/27/23: 61.1 kg (134 lb 12.8 oz).     Feng Doherty MD  [facility] CARLITA Delgado is a 77 year old, presenting for the following health issues:  No chief complaint on file.         No data to display              HPI     Karen is daughter on the phone as well    Hospital Follow-up Visit:    Hospital/Nursing Home/IP Rehab Facility: [facility]  Date of Admission: 6/15/23  Date of Discharge: 6/17/23    TCU until 6/28  Reason(s) for Admission:   1.  Melena due to PUD  2.  Gastric ulcer with hemorrhage  3.  Pelvic fractures, subacute (originally on 6/10/2023)  4.  Anemia  5.  History of hypothyroidism    Continue PPI daily x 8 weeks  EGD in 8 weeks - GI will call to schedule  Consider an iron infusion if symptomatic anemia.   Await path  GI will sign off, please call with questions/concerns.     She will do outpatient physical therapy.  She denies any bleeding, denied chest pain shortness of breath cough congestion constipation or diarrhea.  She denied pain.  She will follow-up with Ortho on 7/5/2023.  And also tells writer she will follow-up with GI for an endoscopy on 8/29/2023.  Hemoglobin stable at 9.0 which is up from 8.5.    # Upper GI bleed  Back on aspirin  Has egd scheduled  Has iron listed in her meds but she doesn't see it in her meds    Was your hospitalization related to COVID-19? No   Problems taking medications regularly:  None  Medication changes since discharge: None  Problems adhering to non-medication therapy:  None    Summary of hospitalization:  [facility] discharge summary reviewed  Diagnostic Tests/Treatments reviewed.  Follow up needed: Ortho and GI  Other Healthcare Providers Involved in Patient s Care:         Homecare  Update since discharge: improved.       # Pelvic fracture   Has been doing okay at home since discharge  Alternating oxy/tylenol every 6 hours  Wednesday " tried not to take the oxycodone but she was in a lot of pain    Constipation - see 7/9/23 Summer  The very next day had two large stools.   Taking the senna.     physical therapy hasn't come yet  [facility].   Walking with a stride but slow with walker   Has a single story   Always asks a family member to come over for bathing  Food is okay - ordering or dropping     Had a f/up with Ortho on 7/5/23 - one of the fractures was healed and maybe one moved a bit but Ortho Surgeon thought it was okay.   --- had right hip replaced 2019  They want to see her again in 6 weeks  Had gone off of oxycodone for a day or two   Left side is feeling more comfortable - right side feels like someone kicked her. Did an xray of the right side too.     Plan of care communicated with patient and family             Review of Systems         Objective           Vitals:  No vitals were obtained today due to virtual visit.    Physical Exam   healthy, alert and no distress  PSYCH: Alert and oriented times 3; coherent speech, normal   rate and volume, able to articulate logical thoughts, able   to abstract reason, no tangential thoughts, no hallucinations   or delusions  Her affect is normal  RESP: No cough, no audible wheezing, able to talk in full sentences  Remainder of exam unable to be completed due to telephone visits          Phone call duration: 25 minutes

## 2023-07-14 NOTE — PROGRESS NOTES
Call placed to [facility] to see  If they received the referral  They did not receive the referral  Per [facility] the referral is for   [facility]    Call placed to [facility]  LM to return call to triage    Thank you  Yaakov Wilson RN on 7/14/2023 at 2:26 PM

## 2023-07-17 ENCOUNTER — LAB (OUTPATIENT)
Dept: LAB | Facility: CLINIC | Age: 78
End: 2023-07-17
Payer: COMMERCIAL

## 2023-07-17 DIAGNOSIS — K92.2 UPPER GI BLEEDING: ICD-10-CM

## 2023-07-17 LAB — FERRITIN SERPL-MCNC: 44 NG/ML (ref 11–328)

## 2023-07-17 PROCEDURE — 36415 COLL VENOUS BLD VENIPUNCTURE: CPT

## 2023-07-17 PROCEDURE — 82728 ASSAY OF FERRITIN: CPT

## 2023-07-18 ENCOUNTER — TELEPHONE (OUTPATIENT)
Dept: PEDIATRICS | Facility: CLINIC | Age: 78
End: 2023-07-18
Payer: COMMERCIAL

## 2023-07-18 NOTE — TELEPHONE ENCOUNTER
Received a call from Tri from [facility]. [phone].    They are waiting for home care orders.  She said they put in orders on July 11.      She is looking for PT orders/OT.  It is just an eval and treat.        See telephone call of 7/11/23.  It looks like Dr. Dhoerty routed that to Homecare and hospice Referral.      Dr. Doherty - are you ok following the pt for home care and are you ok for a PT/OT eval and treat?  Please respond to EA triage.        Home Care is calling regarding an established patient with [facility].       Requesting orders from: Feng Doherty  Provider is following patient: No       Orders Requested    Physical Therapy  Request for initial certification (first set of orders)   Frequency: eval and treat    Occupational Therapy  Request for initial certification (first set of orders)   Frequency: eval and treat      Confirmed ok to leave a detailed message with call back.  Contact information confirmed and updated as needed.    Sakshi Albarado RN

## 2023-07-20 NOTE — PROGRESS NOTES
Closing encounter.  [facility] has requested orders    Yaakov Wilson RN on 7/20/2023 at 11:35 AM

## 2023-07-24 ENCOUNTER — PATIENT OUTREACH (OUTPATIENT)
Dept: CARE COORDINATION | Facility: CLINIC | Age: 78
End: 2023-07-24
Payer: COMMERCIAL

## 2023-07-24 ASSESSMENT — ACTIVITIES OF DAILY LIVING (ADL): DEPENDENT_IADLS:: INDEPENDENT

## 2023-07-24 NOTE — PROGRESS NOTES
Clinic Care Coordination Contact  Clinic Care Coordination Contact  OUTREACH    Referral Information:  Referral Source: IP Handoff    Primary Diagnosis: Orthopedic    Chief Complaint   Patient presents with    Clinic Care Coordination - Homecare/TCU     Discharge from TCU 6/28/2023        [place] Utilization:   Clinic Utilization  Difficulty keeping appointments:: No  Compliance Concerns: No  No-Show Concerns: No  No PCP office visit in Past Year: No  Utilization      Hospital Admissions  2             ED Visits  2             No Show Count (past year)  1                    Current as of: 7/23/2023  9:25 AM                Clinical Concerns:  Current Medical Concerns:    Patient Active Problem List   Diagnosis    Raynaud's syndrome    Hyperlipemia    Hypothyroid    Coronary Artery Disease s/p PCI of RCA in 2012    Osteopenia - last dexa 12/23, qualified for treatment but holding next dexa in 12/2024    Vitamin D deficiency    Meningioma, small in 2012 - saw NSG and told that she did not need monitoring. (H)    Anxiety    History of cold sores    Family history of blood clots    Multiple closed fractures of pelvis without disruption of pelvic ring, initial encounter (H)    Chronic gastric ulcer with hemorrhage - repeat UGI in 8/2023 for healing    Upper GI bleeding       [facility] outreached to pt on this date for follow up from discharge from TCU 6/28/2023. Pt shares she is doing well. Pt has finished Oxycodone Rx and now only takes Tylenol for pain management. Pt adds ice packs and positioning helps as well. Pt has Home Care involved in care. Initial PT visit was this week with anticipated frequency of 2x/week. Pt has strong supports from family and she says if she needs anything her kids are available to support her.     Care Coordination was reviewed. Pt denied any ongoing needs and declined enrollment. Pt expressed understanding.     Current Behavioral Concerns: Anxiety- treated with Sertraline.       Education  Provided to patient: Pt inquired of lab results. [facility] reviewed record which indicates results should have been sent to [facility] [employer] [facility]. Pt states she will check [facility] again today.      Pain  Pain (GOAL):: Yes  Type: Acute (<3mo)  Location of chronic pain:: pelvic fracture  Progression: Improving  Alleviating Factors: Ice, Rest, Pain Medication  Health Maintenance Reviewed: Due/Overdue There are no preventive care reminders to display for this patient.    Clinical Pathway: None    Medication Management:  Medication review status: Medications reviewed and no changes reported per patient.       Oxycodone Rx . Pt taking Tylenol.    Functional Status:  Dependent ADLs:: Independent (has been using a cane only since discharge from hospital on )  Dependent IADLs:: Independent (has not been driving since recent hospital stay 6-10-)  Bed or wheelchair confined:: No  Mobility Status: Independent w/Device  Fallen 2 or more times in the past year?: Yes  Any fall with injury in the past year?: Yes    Living Situation:  Current living arrangement:: I live alone  Type of residence:: Private home - no stairs    Lifestyle & Psychosocial Needs:    Social Determinants of Health     Tobacco Use: Low Risk  (2023)    Patient History     Smoking Tobacco Use: Never     Smokeless Tobacco Use: Never     Passive Exposure: Not on file   Alcohol Use: Not At Risk (2022)    AUDIT-C     Frequency of Alcohol Consumption: 2-3 times a week     Average Number of Drinks: 1 or 2     Frequency of Binge Drinking: Never   Financial Resource Strain: Low Risk  (2022)    Overall Financial Resource Strain (CARDIA)     Difficulty of Paying Living Expenses: Not hard at all   Food Insecurity: No Food Insecurity (2022)    Hunger Vital Sign     Worried About Running Out of Food in the Last Year: Never true     Ran Out of Food in the Last Year: Never true   Transportation Needs: No Transportation Needs (2022)    PRAPARE -  Transportation     Lack of Transportation (Medical): No     Lack of Transportation (Non-Medical): No   Physical Activity: Insufficiently Active (12/4/2022)    Exercise Vital Sign     Days of Exercise per Week: 2 days     Minutes of Exercise per Session: 20 min   Stress: No Stress Concern Present (12/4/2022)    [employer] - Occupational Stress Questionnaire     Feeling of Stress : Not at all   Social Connections: Moderately Integrated (12/4/2022)    Social Connection and Isolation Panel [NHANES]     Frequency of Communication with Friends and Family: More than three times a week     Frequency of Social Gatherings with Friends and Family: Three times a week     Attends Mormon Services: More than 4 times per year     Active Member of Clubs or Organizations: Yes     Attends Club or Organization Meetings: Not on file     Marital Status:    Intimate Partner Violence: Not on file   Depression: Not at risk (6/15/2023)    PHQ-2     PHQ-2 Score: 0   Housing Stability: Unknown (12/4/2022)    Housing Stability Vital Sign     Unable to Pay for Housing in the Last Year: No     Number of Places Lived in the Last Year: 1     Unstable Housing in the Last Year: Not on file     Diet:: Regular  Inadequate nutrition (GOAL):: No  Tube Feeding: No  Inadequate activity/exercise (GOAL):: No  Significant changes in sleep pattern (GOAL): No  Transportation means:: Family     Mormon or spiritual beliefs that impact treatment:: No  Mental health DX:: Yes  Mental health DX how managed:: Medication  Mental health management concern (GOAL):: No  Informal Support system:: Family     Care Coordinator has reviewed patient's Social Determinants of Health (SDoH) on this date. Upon review, changes were not  made.        Resources and Interventions:  Current Resources:   Skilled Home Care Services: Physical Therapy, Occupational Therapy  Community Resources: Home Care  Supplies Currently Used at Home:  (Rx  glasses, has been using walker since recent hospital admit and fracture, had a cane from previous)  Equipment Currently Used at Home: walker, standard, grab bar, tub/shower, grab bar, toilet  Employment Status: retired         Advance Care Plan/Directive  Advanced Care Plans/Directives on file:: Yes  Type Advanced Care Plans/Directives: Advanced Directive - On File    Referrals Placed: None    Patient/Caregiver understanding: Pt reports understanding and denies any additional questions or concerns at this times. [facility] engaged in AIDET communication during encounter.      Plan: Pt to continue to follow up with Home Care as recommended. Pt denied any other needs or questions. No further outreaches will be made at this time unless a new referral is made or a change in the pt's status occurs. Patient was provided with this writer's contact information and encouraged to call with any questions or concerns.     [facility]  Clinic Care Coordinator  [facility]  [phone]  [email or web address]

## 2023-07-26 ENCOUNTER — TELEPHONE (OUTPATIENT)
Dept: PEDIATRICS | Facility: CLINIC | Age: 78
End: 2023-07-26
Payer: COMMERCIAL

## 2023-07-27 DIAGNOSIS — Z53.9 DIAGNOSIS NOT YET DEFINED: Primary | ICD-10-CM

## 2023-07-27 PROCEDURE — G0180 MD CERTIFICATION HHA PATIENT: HCPCS | Performed by: INTERNAL MEDICINE

## 2023-08-15 ENCOUNTER — TRANSFERRED RECORDS (OUTPATIENT)
Dept: HEALTH INFORMATION MANAGEMENT | Facility: CLINIC | Age: 78
End: 2023-08-15

## 2023-08-15 ENCOUNTER — LAB (OUTPATIENT)
Dept: LAB | Facility: CLINIC | Age: 78
End: 2023-08-15
Payer: COMMERCIAL

## 2023-08-15 DIAGNOSIS — K92.2 UPPER GI BLEEDING: ICD-10-CM

## 2023-08-15 LAB
ERYTHROCYTE [DISTWIDTH] IN BLOOD BY AUTOMATED COUNT: 12.6 % (ref 10–15)
FERRITIN SERPL-MCNC: 44 NG/ML (ref 11–328)
HCT VFR BLD AUTO: 34.2 % (ref 35–47)
HGB BLD-MCNC: 11 G/DL (ref 11.7–15.7)
MCH RBC QN AUTO: 30.2 PG (ref 26.5–33)
MCHC RBC AUTO-ENTMCNC: 32.2 G/DL (ref 31.5–36.5)
MCV RBC AUTO: 94 FL (ref 78–100)
PLATELET # BLD AUTO: 306 10E3/UL (ref 150–450)
RBC # BLD AUTO: 3.64 10E6/UL (ref 3.8–5.2)
WBC # BLD AUTO: 5.2 10E3/UL (ref 4–11)

## 2023-08-15 PROCEDURE — 36415 COLL VENOUS BLD VENIPUNCTURE: CPT

## 2023-08-15 PROCEDURE — 82728 ASSAY OF FERRITIN: CPT

## 2023-08-15 PROCEDURE — 85027 COMPLETE CBC AUTOMATED: CPT

## 2023-08-17 DIAGNOSIS — K92.2 UPPER GI BLEEDING: Primary | ICD-10-CM

## 2023-08-23 ENCOUNTER — TRANSFERRED RECORDS (OUTPATIENT)
Dept: HEALTH INFORMATION MANAGEMENT | Facility: CLINIC | Age: 78
End: 2023-08-23
Payer: COMMERCIAL

## 2023-08-29 ENCOUNTER — TRANSFERRED RECORDS (OUTPATIENT)
Dept: HEALTH INFORMATION MANAGEMENT | Facility: CLINIC | Age: 78
End: 2023-08-29
Payer: COMMERCIAL

## 2023-08-30 ENCOUNTER — TRANSFERRED RECORDS (OUTPATIENT)
Dept: HEALTH INFORMATION MANAGEMENT | Facility: CLINIC | Age: 78
End: 2023-08-30
Payer: COMMERCIAL

## 2023-09-06 ENCOUNTER — MEDICAL CORRESPONDENCE (OUTPATIENT)
Dept: HEALTH INFORMATION MANAGEMENT | Facility: CLINIC | Age: 78
End: 2023-09-06

## 2023-09-29 ENCOUNTER — TRANSFERRED RECORDS (OUTPATIENT)
Dept: HEALTH INFORMATION MANAGEMENT | Facility: CLINIC | Age: 78
End: 2023-09-29
Payer: COMMERCIAL

## 2023-10-27 ENCOUNTER — LAB (OUTPATIENT)
Dept: LAB | Facility: CLINIC | Age: 78
End: 2023-10-27
Payer: COMMERCIAL

## 2023-10-27 DIAGNOSIS — I25.10 ATHEROSCLEROSIS OF NATIVE CORONARY ARTERY OF NATIVE HEART WITHOUT ANGINA PECTORIS: ICD-10-CM

## 2023-10-27 DIAGNOSIS — K92.2 UPPER GI BLEEDING: Primary | ICD-10-CM

## 2023-10-27 DIAGNOSIS — K92.1 BLACK STOOLS: ICD-10-CM

## 2023-10-27 DIAGNOSIS — E55.9 VITAMIN D DEFICIENCY: ICD-10-CM

## 2023-10-27 DIAGNOSIS — R73.9 HYPERGLYCEMIA: ICD-10-CM

## 2023-10-27 DIAGNOSIS — K92.2 UPPER GI BLEEDING: ICD-10-CM

## 2023-10-27 DIAGNOSIS — E03.9 HYPOTHYROIDISM, UNSPECIFIED TYPE: ICD-10-CM

## 2023-10-27 LAB
ALBUMIN SERPL BCG-MCNC: 3.9 G/DL (ref 3.5–5.2)
ALP SERPL-CCNC: 106 U/L (ref 35–104)
ALT SERPL W P-5'-P-CCNC: 15 U/L (ref 0–50)
ANION GAP SERPL CALCULATED.3IONS-SCNC: 10 MMOL/L (ref 7–15)
AST SERPL W P-5'-P-CCNC: 26 U/L (ref 0–45)
BILIRUB SERPL-MCNC: 0.5 MG/DL
BUN SERPL-MCNC: 18.7 MG/DL (ref 8–23)
CALCIUM SERPL-MCNC: 10.1 MG/DL (ref 8.8–10.2)
CHLORIDE SERPL-SCNC: 104 MMOL/L (ref 98–107)
CREAT SERPL-MCNC: 0.86 MG/DL (ref 0.51–0.95)
DEPRECATED HCO3 PLAS-SCNC: 26 MMOL/L (ref 22–29)
EGFRCR SERPLBLD CKD-EPI 2021: 70 ML/MIN/1.73M2
ERYTHROCYTE [DISTWIDTH] IN BLOOD BY AUTOMATED COUNT: 14.5 % (ref 10–15)
FERRITIN SERPL-MCNC: 32 NG/ML (ref 11–328)
GLUCOSE SERPL-MCNC: 106 MG/DL (ref 70–99)
HBA1C MFR BLD: 6.6 % (ref 0–5.6)
HCT VFR BLD AUTO: 37.7 % (ref 35–47)
HGB BLD-MCNC: 12.2 G/DL (ref 11.7–15.7)
MCH RBC QN AUTO: 30 PG (ref 26.5–33)
MCHC RBC AUTO-ENTMCNC: 32.4 G/DL (ref 31.5–36.5)
MCV RBC AUTO: 93 FL (ref 78–100)
PLATELET # BLD AUTO: 253 10E3/UL (ref 150–450)
POTASSIUM SERPL-SCNC: 4.4 MMOL/L (ref 3.4–5.3)
PROT SERPL-MCNC: 7.6 G/DL (ref 6.4–8.3)
RBC # BLD AUTO: 4.06 10E6/UL (ref 3.8–5.2)
SODIUM SERPL-SCNC: 140 MMOL/L (ref 135–145)
WBC # BLD AUTO: 4.7 10E3/UL (ref 4–11)

## 2023-10-27 PROCEDURE — 82728 ASSAY OF FERRITIN: CPT

## 2023-10-27 PROCEDURE — 80053 COMPREHEN METABOLIC PANEL: CPT

## 2023-10-27 PROCEDURE — 83036 HEMOGLOBIN GLYCOSYLATED A1C: CPT

## 2023-10-27 PROCEDURE — 36415 COLL VENOUS BLD VENIPUNCTURE: CPT

## 2023-10-27 PROCEDURE — 85027 COMPLETE CBC AUTOMATED: CPT

## 2023-10-27 RX ORDER — FERROUS SULFATE 325(65) MG
325 TABLET, DELAYED RELEASE (ENTERIC COATED) ORAL EVERY OTHER DAY
Qty: 45 TABLET | Refills: 1 | Status: SHIPPED | OUTPATIENT
Start: 2023-10-27 | End: 2024-02-28

## 2023-12-03 DIAGNOSIS — E03.9 ACQUIRED HYPOTHYROIDISM: ICD-10-CM

## 2023-12-04 RX ORDER — LEVOTHYROXINE SODIUM 75 UG/1
75 TABLET ORAL DAILY
Qty: 90 TABLET | Refills: 0 | Status: SHIPPED | OUTPATIENT
Start: 2023-12-04 | End: 2024-02-28

## 2023-12-15 ENCOUNTER — OFFICE VISIT (OUTPATIENT)
Dept: CARDIOLOGY | Facility: CLINIC | Age: 78
End: 2023-12-15
Payer: COMMERCIAL

## 2023-12-15 VITALS
RESPIRATION RATE: 20 BRPM | OXYGEN SATURATION: 90 % | HEART RATE: 85 BPM | WEIGHT: 137.4 LBS | SYSTOLIC BLOOD PRESSURE: 128 MMHG | DIASTOLIC BLOOD PRESSURE: 60 MMHG | BODY MASS INDEX: 25.96 KG/M2

## 2023-12-15 DIAGNOSIS — I25.10 CORONARY ARTERY DISEASE INVOLVING NATIVE CORONARY ARTERY OF NATIVE HEART WITHOUT ANGINA PECTORIS: Primary | ICD-10-CM

## 2023-12-15 DIAGNOSIS — E78.2 MIXED HYPERLIPIDEMIA: ICD-10-CM

## 2023-12-15 DIAGNOSIS — I25.10 ATHEROSCLEROSIS OF NATIVE CORONARY ARTERY OF NATIVE HEART WITHOUT ANGINA PECTORIS: ICD-10-CM

## 2023-12-15 PROCEDURE — 99214 OFFICE O/P EST MOD 30 MIN: CPT | Performed by: INTERNAL MEDICINE

## 2023-12-15 RX ORDER — ASCORBIC ACID 500 MG
50 TABLET ORAL DAILY
COMMUNITY
End: 2023-12-15

## 2023-12-15 RX ORDER — SIMVASTATIN 40 MG
TABLET ORAL
Qty: 90 TABLET | Refills: 3 | Status: SHIPPED | OUTPATIENT
Start: 2023-12-15 | End: 2024-02-28

## 2023-12-15 RX ORDER — METOPROLOL SUCCINATE 25 MG/1
25 TABLET, EXTENDED RELEASE ORAL DAILY
COMMUNITY
End: 2023-12-15

## 2023-12-15 NOTE — LETTER
12/15/2023    Feng Doherty MD  [address] [place] [address]    RE: Rios Michael       Dear Colleague,     I had the pleasure of seeing Rios Michael in the [facility].    HEART CARE ENCOUNTER CONSULTATON NOTE      [facility]  [phone]      Assessment/Recommendations   Assessment:  1.  Coronary artery disease: Status post PCI of RCA in 2012.   No anginal complaints and active for her age  2.  Dyslipidemia: Lipids well controlled   3.  Hypertension: Blood pressure remains well controlled.     Plan:  1.  Continue on aspirin, statin therapy  2.  Follow-up in 1 year         History of Present Illness/Subjective    HPI: Rios Michael is a 77 year old female with history of coronary artery disease status post PCI of her RCA in February 2012 with preserved left ventricular systolic function, hypertension and dyslipidemia who I am seeing today in follow up. She had a bad fall this year when her puppy pulled her during a walk and she fell.  She otherwise has been doing well without problems with recurrent light headedness.  No dyspnea or chest pain.        Physical Examination  Review of Systems   Vitals: /60 (BP Location: Right arm, Patient Position: Sitting, Cuff Size: Adult Regular)   Pulse 85   Resp 20   Wt 62.3 kg (137 lb 6.4 oz)   SpO2 90%   BMI 25.96 kg/m    BMI= Body mass index is 25.96 kg/m .  Wt Readings from Last 3 Encounters:   12/15/23 62.3 kg (137 lb 6.4 oz)   06/27/23 61.1 kg (134 lb 12.8 oz)   06/22/23 61.7 kg (136 lb)       General Appearance:   no distress, normal body habitus   ENT/Mouth: membranes moist, no oral lesions or bleeding gums.      EYES:  no scleral icterus, normal conjunctivae   Neck: no carotid bruits or thyromegaly   Chest/Lungs:   lungs are clear to auscultation   Cardiovascular:   Regular. Normal first and second heart sounds with no murmur  no edema bilaterally    Abdomen:  bowel sounds are present    Extremities: no cyanosis or clubbing   Skin: no xanthelasma, warm.    Neurologic: normal  bilateral, no tremors     Psychiatric: alert and oriented x3, calm        Please refer above for cardiac ROS details.        Medical History  Surgical History Family History Social History   Past Medical History:   Diagnosis Date    Arthritis     Breast cyst [id number]    Coronary artery disease     Depression     Disease of thyroid gland     Family history of clotting disorder     Herpes simplex virus (HSV) infection     Created by Conversion  Replacement Utility updated for latest IMO load    High cholesterol     Hypertension     Status post hip replacement, right 11/12/2019     Past Surgical History:   Procedure Laterality Date    BREAST CYST EXCISION  [id number]    Removed from [facility]    CARDIAC CATHETERIZATION      CORONARY STENT PLACEMENT      ESOPHAGOSCOPY, GASTROSCOPY, DUODENOSCOPY (EGD), COMBINED N/A 6/16/2023    Procedure: ESOPHAGOGASTRODUODENOSCOPY with biopsies;  Surgeon: Kana Hollins MD;  Location: [facility] OR    NH PUNC/ASPIR BREAST CYST      Description: Breast Surgery Puncture Aspiration Of Cyst;  Recorded: 07/29/2008;    [facility] TOTAL HIP ARTHROPLASTY Right 11/12/2019    Procedure: RIGHT DIRECT ANTERIOR TOTAL HIP ARTHROPLASTY;  Surgeon: Collin Perez MD;  Location: [facility];  Service: Orthopedics    [facility] CORONARY STENT PERCUT, INITIAL VESSEL      Description: Cath Stent Placement;  Recorded: 06/11/2012;  Comments: ADRIANA to mid RCA     Family History   Problem Relation Age of Onset    Osteoporosis Mother     Dementia Mother     Coronary Artery Disease Father         in 80s    Alzheimer Disease Father     Clotting Disorder Brother         PE at age 64    Other - See Comments Brother         Dementia? Heart issues    Coronary Artery Disease Brother         bypass    Diabetes Type 2  Brother     Pulmonary Embolism Son     Diabetes No family hx of         Social History     Socioeconomic  History    Marital status: Single     Spouse name: Not on file    Number of children: 4    Years of education: Not on file    Highest education level: Not on file   Occupational History    Not on file   Tobacco Use    Smoking status: Never    Smokeless tobacco: Never   Substance and Sexual Activity    Alcohol use: Yes     Alcohol/week: 5.0 standard drinks of alcohol    Drug use: No    Sexual activity: Not on file   Other Topics Concern    Not on file   Social History Narrative    12/2002    She has 4 children, one in [place], others in the [place].  She has 8 grandchildren (ages 14 to 11)        .         Bible study, high school friends have reconnected.     Grew up in [place].      Social Determinants of Health     Financial Resource Strain: Low Risk  (12/4/2022)    Overall Financial Resource Strain (CARDIA)     Difficulty of Paying Living Expenses: Not hard at all   Food Insecurity: No Food Insecurity (12/4/2022)    Hunger Vital Sign     Worried About Running Out of Food in the Last Year: Never true     Ran Out of Food in the Last Year: Never true   Transportation Needs: No Transportation Needs (12/4/2022)    PRAPARE - Transportation     Lack of Transportation (Medical): No     Lack of Transportation (Non-Medical): No   Physical Activity: Insufficiently Active (12/4/2022)    Exercise Vital Sign     Days of Exercise per Week: 2 days     Minutes of Exercise per Session: 20 min   Stress: No Stress Concern Present (12/4/2022)    [employer] [employer] - Occupational Stress Questionnaire     Feeling of Stress : Not at all   Social Connections: Moderately Integrated (12/4/2022)    Social Connection and Isolation Panel [NHANES]     Frequency of Communication with Friends and Family: More than three times a week     Frequency of Social Gatherings with Friends and Family: Three times a week     Attends Mosque Services: More than 4 times per year     Active Member of Clubs or  Organizations: Yes     Attends Club or Organization Meetings: Not on file     Marital Status:    Interpersonal Safety: Not on file   Housing Stability: Unknown (12/4/2022)    Housing Stability Vital Sign     Unable to Pay for Housing in the Last Year: No     Number of Places Lived in the Last Year: 1     Unstable Housing in the Last Year: Not on file           Medications  Allergies   Current Outpatient Medications   Medication Sig Dispense Refill    aspirin 81 MG EC tablet [ASPIRIN 81 MG EC TABLET] Take 1 tablet (81 mg total) by mouth daily.  0    calcium citrate-vitamin D3 (CITRACAL + D) 315-250 mg-unit per tablet Take 1 tablet by mouth At Bedtime      cholecalciferol, vitamin D3, (VITAMIN D3) 1,000 unit capsule Take 1,000 Units by mouth At Bedtime      ferrous sulfate (FE TABS) 325 (65 Fe) MG EC tablet Take 1 tablet (325 mg) by mouth every other day 45 tablet 1    hypromellose (ARTIFICIAL TEARS) 0.5 % SOLN ophthalmic solution Place 1 drop into both eyes every hour as needed for dry eyes      levothyroxine (SYNTHROID/LEVOTHROID) 75 MCG tablet Take 1 tablet (75 mcg) by mouth daily 90 tablet 0    multivitamin therapeutic (THERAGRAN) tablet [MULTIVITAMIN THERAPEUTIC (THERAGRAN) TABLET] Take 1 tablet by mouth daily. 90 tablet 1    sertraline (ZOLOFT) 25 MG tablet [SERTRALINE (ZOLOFT) 25 MG TABLET] TAKE 3 TABLETS BY MOUTH EVERY  tablet 3    simvastatin (ZOCOR) 40 MG tablet TAKE 1 TABLET BY MOUTH EVERYDAY AT BEDTIME 90 tablet 3    valACYclovir (VALTREX) 500 MG tablet Take 500 mg by mouth 2 times daily as needed (cold sores)         Allergies   Allergen Reactions    Estradiol Hives    Evista [Raloxifene] Hives          Lab Results    Chemistry/lipid CBC Cardiac Enzymes/BNP/TSH/INR   Recent Labs   Lab Test 01/05/23  0835   CHOL 137   HDL 71   LDL 56   TRIG 51     Recent Labs   Lab Test 01/05/23  0835 12/08/21  1016 10/08/20  1011   LDL 56 71 61     Recent Labs   Lab Test 10/27/23  1054      POTASSIUM  "4.4   CHLORIDE 104   CO2 26   *   BUN 18.7   CR 0.86   GFRESTIMATED 70   ALEX 10.1     Recent Labs   Lab Test 10/27/23  1054 06/26/23  0626 06/23/23  0631   CR 0.86 0.89 0.95     Recent Labs   Lab Test 10/27/23  1054   A1C 6.6*          Recent Labs   Lab Test 10/27/23  1054   WBC 4.7   HGB 12.2   HCT 37.7   MCV 93        Recent Labs   Lab Test 10/27/23  1054 08/15/23  1400 06/26/23  0626   HGB 12.2 11.0* 9.0*    No results for input(s): \"TROPONINI\" in the last 47167 hours.  No results for input(s): \"BNP\", \"NTBNPI\", \"NTBNP\" in the last 82765 hours.  Recent Labs   Lab Test 01/05/23  0835   TSH 1.35     Recent Labs   Lab Test 06/15/23  2119 11/12/19  0745   INR 0.99 1.10        Itzel Hanson MD          Thank you for allowing me to participate in the care of your patient.      Sincerely,     Itzel Hanson MD     [facility]  cc:   Itzel Hanson MD  [address]  [address]  [place] [address]      "

## 2023-12-19 NOTE — PROGRESS NOTES
HEART CARE ENCOUNTER CONSULTATON NOTE      [facility]  [phone]      Assessment/Recommendations   Assessment:  1.  Coronary artery disease: Status post PCI of RCA in 2012.   No anginal complaints and active for her age  2.  Dyslipidemia: Lipids well controlled   3.  Hypertension: Blood pressure remains well controlled.     Plan:  1.  Continue on aspirin, statin therapy  2.  Follow-up in 1 year         History of Present Illness/Subjective    HPI: Rios Michael is a 77 year old female with history of coronary artery disease status post PCI of her RCA in February 2012 with preserved left ventricular systolic function, hypertension and dyslipidemia who I am seeing today in follow up. She had a bad fall this year when her puppy pulled her during a walk and she fell.  She otherwise has been doing well without problems with recurrent light headedness.  No dyspnea or chest pain.        Physical Examination  Review of Systems   Vitals: /60 (BP Location: Right arm, Patient Position: Sitting, Cuff Size: Adult Regular)   Pulse 85   Resp 20   Wt 62.3 kg (137 lb 6.4 oz)   SpO2 90%   BMI 25.96 kg/m    BMI= Body mass index is 25.96 kg/m .  Wt Readings from Last 3 Encounters:   12/15/23 62.3 kg (137 lb 6.4 oz)   06/27/23 61.1 kg (134 lb 12.8 oz)   06/22/23 61.7 kg (136 lb)       General Appearance:   no distress, normal body habitus   ENT/Mouth: membranes moist, no oral lesions or bleeding gums.      EYES:  no scleral icterus, normal conjunctivae   Neck: no carotid bruits or thyromegaly   Chest/Lungs:   lungs are clear to auscultation   Cardiovascular:   Regular. Normal first and second heart sounds with no murmur  no edema bilaterally    Abdomen:  bowel sounds are present   Extremities: no cyanosis or clubbing   Skin: no xanthelasma, warm.    Neurologic: normal  bilateral, no tremors     Psychiatric: alert and oriented x3, calm        Please refer above for cardiac ROS details.         Medical History  Surgical History Family History Social History   Past Medical History:   Diagnosis Date     Arthritis      Breast cyst [id number]     Coronary artery disease      Depression      Disease of thyroid gland      Family history of clotting disorder      Herpes simplex virus (HSV) infection     Created by Conversion  Replacement Utility updated for latest IMO load     High cholesterol      Hypertension      Status post hip replacement, right 11/12/2019     Past Surgical History:   Procedure Laterality Date     BREAST CYST EXCISION  [id number]    Removed from [facility] area     CARDIAC CATHETERIZATION       CORONARY STENT PLACEMENT       ESOPHAGOSCOPY, GASTROSCOPY, DUODENOSCOPY (EGD), COMBINED N/A 6/16/2023    Procedure: ESOPHAGOGASTRODUODENOSCOPY with biopsies;  Surgeon: Kana Hollins MD;  Location: [facility] Main OR     [place] PUNC/ASPIR BREAST CYST      Description: Breast Surgery Puncture Aspiration Of Cyst;  Recorded: 07/29/2008;     [facility] TOTAL HIP ARTHROPLASTY Right 11/12/2019    Procedure: RIGHT DIRECT ANTERIOR TOTAL HIP ARTHROPLASTY;  Surgeon: Collin Perez MD;  Location: [facility] OR;  Service: Orthopedics     [facility] CORONARY STENT PERCUT, INITIAL VESSEL      Description: Cath Stent Placement;  Recorded: 06/11/2012;  Comments: ADRIANA to mid RCA     Family History   Problem Relation Age of Onset     Osteoporosis Mother      Dementia Mother      Coronary Artery Disease Father         in 80s     Alzheimer Disease Father      Clotting Disorder Brother         PE at age 64     Other - See Comments Brother         Dementia? Heart issues     Coronary Artery Disease Brother         bypass     Diabetes Type 2  Brother      Pulmonary Embolism Son      Diabetes No family hx of         Social History     Socioeconomic History     Marital status: Single     Spouse name: Not on file     Number of children: 4     Years of education: Not on file     Highest education level: Not on file   Occupational History      Not on file   Tobacco Use     Smoking status: Never     Smokeless tobacco: Never   Substance and Sexual Activity     Alcohol use: Yes     Alcohol/week: 5.0 standard drinks of alcohol     Drug use: No     Sexual activity: Not on file   Other Topics Concern     Not on file   Social History Narrative    12/2002    She has 4 children, one in [place], others in the [place].  She has 8 grandchildren (ages 14 to 11)        .         Bible study, high school friends have reconnected.     Grew up in [place].      Social Determinants of Health     Financial Resource Strain: Low Risk  (12/4/2022)    Overall Financial Resource Strain (CARDIA)      Difficulty of Paying Living Expenses: Not hard at all   Food Insecurity: No Food Insecurity (12/4/2022)    Hunger Vital Sign      Worried About Running Out of Food in the Last Year: Never true      Ran Out of Food in the Last Year: Never true   Transportation Needs: No Transportation Needs (12/4/2022)    PRAPARE - Transportation      Lack of Transportation (Medical): No      Lack of Transportation (Non-Medical): No   Physical Activity: Insufficiently Active (12/4/2022)    Exercise Vital Sign      Days of Exercise per Week: 2 days      Minutes of Exercise per Session: 20 min   Stress: No Stress Concern Present (12/4/2022)    [employer] [place] [employer] - Occupational Stress Questionnaire      Feeling of Stress : Not at all   Social Connections: Moderately Integrated (12/4/2022)    Social Connection and Isolation Panel [NHANES]      Frequency of Communication with Friends and Family: More than three times a week      Frequency of Social Gatherings with Friends and Family: Three times a week      Attends Church Services: More than 4 times per year      Active Member of Clubs or Organizations: Yes      Attends Club or Organization Meetings: Not on file      Marital Status:    Interpersonal Safety: Not on file   Housing Stability: Unknown  (12/4/2022)    Housing Stability Vital Sign      Unable to Pay for Housing in the Last Year: No      Number of Places Lived in the Last Year: 1      Unstable Housing in the Last Year: Not on file           Medications  Allergies   Current Outpatient Medications   Medication Sig Dispense Refill     aspirin 81 MG EC tablet [ASPIRIN 81 MG EC TABLET] Take 1 tablet (81 mg total) by mouth daily.  0     calcium citrate-vitamin D3 (CITRACAL + D) 315-250 mg-unit per tablet Take 1 tablet by mouth At Bedtime       cholecalciferol, vitamin D3, (VITAMIN D3) 1,000 unit capsule Take 1,000 Units by mouth At Bedtime       ferrous sulfate (FE TABS) 325 (65 Fe) MG EC tablet Take 1 tablet (325 mg) by mouth every other day 45 tablet 1     hypromellose (ARTIFICIAL TEARS) 0.5 % SOLN ophthalmic solution Place 1 drop into both eyes every hour as needed for dry eyes       levothyroxine (SYNTHROID/LEVOTHROID) 75 MCG tablet Take 1 tablet (75 mcg) by mouth daily 90 tablet 0     multivitamin therapeutic (THERAGRAN) tablet [MULTIVITAMIN THERAPEUTIC (THERAGRAN) TABLET] Take 1 tablet by mouth daily. 90 tablet 1     sertraline (ZOLOFT) 25 MG tablet [SERTRALINE (ZOLOFT) 25 MG TABLET] TAKE 3 TABLETS BY MOUTH EVERY  tablet 3     simvastatin (ZOCOR) 40 MG tablet TAKE 1 TABLET BY MOUTH EVERYDAY AT BEDTIME 90 tablet 3     valACYclovir (VALTREX) 500 MG tablet Take 500 mg by mouth 2 times daily as needed (cold sores)         Allergies   Allergen Reactions     Estradiol Hives     Evista [Raloxifene] Hives          Lab Results    Chemistry/lipid CBC Cardiac Enzymes/BNP/TSH/INR   Recent Labs   Lab Test 01/05/23  0835   CHOL 137   HDL 71   LDL 56   TRIG 51     Recent Labs   Lab Test 01/05/23  0835 12/08/21  1016 10/08/20  1011   LDL 56 71 61     Recent Labs   Lab Test 10/27/23  1054      POTASSIUM 4.4   CHLORIDE 104   CO2 26   *   BUN 18.7   CR 0.86   GFRESTIMATED 70   ALEX 10.1     Recent Labs   Lab Test 10/27/23  1054 06/26/23  0626  "06/23/23  0631   CR 0.86 0.89 0.95     Recent Labs   Lab Test 10/27/23  1054   A1C 6.6*          Recent Labs   Lab Test 10/27/23  1054   WBC 4.7   HGB 12.2   HCT 37.7   MCV 93        Recent Labs   Lab Test 10/27/23  1054 08/15/23  1400 06/26/23  0626   HGB 12.2 11.0* 9.0*    No results for input(s): \"TROPONINI\" in the last 10128 hours.  No results for input(s): \"BNP\", \"NTBNPI\", \"NTBNP\" in the last 89123 hours.  Recent Labs   Lab Test 01/05/23  0835   TSH 1.35     Recent Labs   Lab Test 06/15/23  2119 11/12/19  0745   INR 0.99 1.10        Itzel Hanson MD                                      "

## 2024-01-14 ENCOUNTER — MYC REFILL (OUTPATIENT)
Dept: PEDIATRICS | Facility: CLINIC | Age: 79
End: 2024-01-14
Payer: COMMERCIAL

## 2024-01-14 ENCOUNTER — MYC MEDICAL ADVICE (OUTPATIENT)
Dept: PEDIATRICS | Facility: CLINIC | Age: 79
End: 2024-01-14
Payer: COMMERCIAL

## 2024-01-14 DIAGNOSIS — Z86.19 HISTORY OF COLD SORES: Primary | ICD-10-CM

## 2024-01-15 RX ORDER — VALACYCLOVIR HYDROCHLORIDE 500 MG/1
500 TABLET, FILM COATED ORAL 2 TIMES DAILY PRN
Qty: 10 TABLET | Refills: 0 | Status: SHIPPED | OUTPATIENT
Start: 2024-01-15 | End: 2024-02-28

## 2024-01-23 NOTE — TELEPHONE ENCOUNTER
1st attempt: lvm to sched appt with Lisa-Karis or residents. If it's with residents please put precept KMB in appt notes.      JOEY RICHARDSON on 1/23/2024 at 2:50 PM

## 2024-01-23 NOTE — TELEPHONE ENCOUNTER
"Okay to use Feb 28th [facility] spot for wellness if that works for Danny. Otherwise would she be okay w/ seeing me in Resident clinic on a day I'm precepting? If so please put \"KMB to precept\" in notes. We could do a virtual Wellness visit as it's not her first Medicare wellness (and she just had an exam done w/ Cardiology).  Future fasting lab orders are in - should be able to draw all at [facility].     FRAN Doherty MD  Internal Medicine-Pediatrics        "

## 2024-01-25 ENCOUNTER — LAB (OUTPATIENT)
Dept: LAB | Facility: CLINIC | Age: 79
End: 2024-01-25
Payer: COMMERCIAL

## 2024-01-25 DIAGNOSIS — R73.9 HYPERGLYCEMIA: ICD-10-CM

## 2024-01-25 DIAGNOSIS — I25.10 ATHEROSCLEROSIS OF NATIVE CORONARY ARTERY OF NATIVE HEART WITHOUT ANGINA PECTORIS: ICD-10-CM

## 2024-01-25 DIAGNOSIS — E03.9 HYPOTHYROIDISM, UNSPECIFIED TYPE: ICD-10-CM

## 2024-01-25 DIAGNOSIS — E55.9 VITAMIN D DEFICIENCY: ICD-10-CM

## 2024-01-25 DIAGNOSIS — K92.2 UPPER GI BLEEDING: ICD-10-CM

## 2024-01-25 LAB
ALBUMIN SERPL BCG-MCNC: 4.2 G/DL (ref 3.5–5.2)
ALP SERPL-CCNC: 82 U/L (ref 40–150)
ALT SERPL W P-5'-P-CCNC: 17 U/L (ref 0–50)
ANION GAP SERPL CALCULATED.3IONS-SCNC: 9 MMOL/L (ref 7–15)
AST SERPL W P-5'-P-CCNC: 24 U/L (ref 0–45)
BILIRUB SERPL-MCNC: 0.7 MG/DL
BUN SERPL-MCNC: 18.6 MG/DL (ref 8–23)
CALCIUM SERPL-MCNC: 10.3 MG/DL (ref 8.8–10.2)
CHLORIDE SERPL-SCNC: 104 MMOL/L (ref 98–107)
CREAT SERPL-MCNC: 0.89 MG/DL (ref 0.51–0.95)
DEPRECATED HCO3 PLAS-SCNC: 28 MMOL/L (ref 22–29)
EGFRCR SERPLBLD CKD-EPI 2021: 66 ML/MIN/1.73M2
ERYTHROCYTE [DISTWIDTH] IN BLOOD BY AUTOMATED COUNT: 12.4 % (ref 10–15)
GLUCOSE SERPL-MCNC: 117 MG/DL (ref 70–99)
HBA1C MFR BLD: 6.5 %
HCT VFR BLD AUTO: 38.9 % (ref 35–47)
HGB BLD-MCNC: 13.1 G/DL (ref 11.7–15.7)
MCH RBC QN AUTO: 31.9 PG (ref 26.5–33)
MCHC RBC AUTO-ENTMCNC: 33.7 G/DL (ref 31.5–36.5)
MCV RBC AUTO: 95 FL (ref 78–100)
PLATELET # BLD AUTO: 265 10E3/UL (ref 150–450)
POTASSIUM SERPL-SCNC: 4.4 MMOL/L (ref 3.4–5.3)
PROT SERPL-MCNC: 7.7 G/DL (ref 6.4–8.3)
RBC # BLD AUTO: 4.11 10E6/UL (ref 3.8–5.2)
SODIUM SERPL-SCNC: 141 MMOL/L (ref 135–145)
TSH SERPL DL<=0.005 MIU/L-ACNC: 1.34 UIU/ML (ref 0.3–4.2)
WBC # BLD AUTO: 4.2 10E3/UL (ref 4–11)

## 2024-01-25 PROCEDURE — 83036 HEMOGLOBIN GLYCOSYLATED A1C: CPT

## 2024-01-25 PROCEDURE — 85027 COMPLETE CBC AUTOMATED: CPT

## 2024-01-25 PROCEDURE — 83718 ASSAY OF LIPOPROTEIN: CPT

## 2024-01-25 PROCEDURE — 84443 ASSAY THYROID STIM HORMONE: CPT

## 2024-01-25 PROCEDURE — 80053 COMPREHEN METABOLIC PANEL: CPT

## 2024-01-25 PROCEDURE — 36415 COLL VENOUS BLD VENIPUNCTURE: CPT

## 2024-01-25 PROCEDURE — 82728 ASSAY OF FERRITIN: CPT

## 2024-01-25 PROCEDURE — 82306 VITAMIN D 25 HYDROXY: CPT

## 2024-01-26 LAB
CHOLEST SERPL-MCNC: 132 MG/DL
FASTING STATUS PATIENT QL REPORTED: YES
FERRITIN SERPL-MCNC: 40 NG/ML (ref 11–328)
HDLC SERPL-MCNC: 63 MG/DL
LDLC SERPL CALC-MCNC: 55 MG/DL
NONHDLC SERPL-MCNC: 69 MG/DL
TRIGL SERPL-MCNC: 72 MG/DL
VIT D+METAB SERPL-MCNC: 69 NG/ML (ref 20–50)

## 2024-02-22 SDOH — HEALTH STABILITY: PHYSICAL HEALTH: ON AVERAGE, HOW MANY MINUTES DO YOU ENGAGE IN EXERCISE AT THIS LEVEL?: 30 MIN

## 2024-02-22 SDOH — HEALTH STABILITY: PHYSICAL HEALTH: ON AVERAGE, HOW MANY DAYS PER WEEK DO YOU ENGAGE IN MODERATE TO STRENUOUS EXERCISE (LIKE A BRISK WALK)?: 2 DAYS

## 2024-02-22 ASSESSMENT — SOCIAL DETERMINANTS OF HEALTH (SDOH): HOW OFTEN DO YOU GET TOGETHER WITH FRIENDS OR RELATIVES?: THREE TIMES A WEEK

## 2024-02-27 ASSESSMENT — PATIENT HEALTH QUESTIONNAIRE - PHQ9
SUM OF ALL RESPONSES TO PHQ QUESTIONS 1-9: 0
SUM OF ALL RESPONSES TO PHQ QUESTIONS 1-9: 0

## 2024-02-27 NOTE — PROGRESS NOTES
# CAD s/p PCI of RCA in 2012  - asa 81  - simvastatin 40    # Diabetes  Lab Results   Component Value Date    A1C 6.5 01/25/2024    A1C 6.6 10/27/2023    A1C 5.9 08/01/2013    A1C 6.3 05/24/2012       # HLD  - simvastatin 40    # Hypothyroid  TSH   Date Value Ref Range Status   01/25/2024 1.34 0.30 - 4.20 uIU/mL Final   12/08/2021 1.19 0.30 - 5.00 uIU/mL Final   - synthoid    # Chronic gastric ulcer w/ hemorrhoid - resolve don UGI in 8/2023   Latest Reference Range & Units 01/25/24 12:36   Ferritin 11 - 328 ng/mL 40      Latest Reference Range & Units 06/17/23 06:08 06/23/23 06:31 06/26/23 06:26 08/15/23 14:00 10/27/23 10:54 01/25/24 12:36   Hemoglobin 11.7 - 15.7 g/dL 7.9 (L) 8.5 (L) 9.0 (L) 11.0 (L) 12.2 13.1   (L): Data is abnormally low  - iron  325 qod    # Vitamin d def  - calcium/d  - vitamin d  Vitamin D Deficiency Screening Results:  Lab Results   Component Value Date    VITDT 69 (H) 01/25/2024    VITDT 50 01/05/2023     # Mental health  # Anxiety      12/8/2021    10:10 AM 12/5/2022     4:05 PM 6/15/2023    10:57 AM   PHQ   PHQ-9 Total Score 0 0 0   Q9: Thoughts of better off dead/self-harm past 2 weeks Not at all Not at all Not at all         12/5/2022     4:06 PM   KASH-7 SCORE   Total Score 0   - zoloft 75 mg daily    # Meningioma, small 2012  - NSG said no monitoring    # Cold sores  - valtrex prn

## 2024-02-28 ENCOUNTER — OFFICE VISIT (OUTPATIENT)
Dept: PEDIATRICS | Facility: CLINIC | Age: 79
End: 2024-02-28
Payer: COMMERCIAL

## 2024-02-28 VITALS
OXYGEN SATURATION: 98 % | RESPIRATION RATE: 12 BRPM | SYSTOLIC BLOOD PRESSURE: 120 MMHG | TEMPERATURE: 97 F | DIASTOLIC BLOOD PRESSURE: 56 MMHG | HEIGHT: 61 IN | BODY MASS INDEX: 26.53 KG/M2 | WEIGHT: 140.5 LBS | HEART RATE: 78 BPM

## 2024-02-28 DIAGNOSIS — I25.10 ATHEROSCLEROSIS OF NATIVE CORONARY ARTERY OF NATIVE HEART WITHOUT ANGINA PECTORIS: ICD-10-CM

## 2024-02-28 DIAGNOSIS — E11.9 TYPE 2 DIABETES MELLITUS WITHOUT COMPLICATION, WITHOUT LONG-TERM CURRENT USE OF INSULIN (H): ICD-10-CM

## 2024-02-28 DIAGNOSIS — Z86.19 HISTORY OF COLD SORES: ICD-10-CM

## 2024-02-28 DIAGNOSIS — Z12.31 ENCOUNTER FOR SCREENING MAMMOGRAM FOR BREAST CANCER: ICD-10-CM

## 2024-02-28 DIAGNOSIS — D32.0 BENIGN NEOPLASM OF CEREBRAL MENINGES (H): ICD-10-CM

## 2024-02-28 DIAGNOSIS — K25.4 CHRONIC GASTRIC ULCER WITH HEMORRHAGE: ICD-10-CM

## 2024-02-28 DIAGNOSIS — Z00.00 ENCOUNTER FOR MEDICARE ANNUAL WELLNESS EXAM: Primary | ICD-10-CM

## 2024-02-28 DIAGNOSIS — E78.2 MIXED HYPERLIPIDEMIA: ICD-10-CM

## 2024-02-28 DIAGNOSIS — F41.9 ANXIETY: ICD-10-CM

## 2024-02-28 DIAGNOSIS — E55.9 VITAMIN D DEFICIENCY: ICD-10-CM

## 2024-02-28 DIAGNOSIS — M89.9 DISORDER OF BONE AND CARTILAGE: ICD-10-CM

## 2024-02-28 DIAGNOSIS — Z12.11 SCREEN FOR COLON CANCER: ICD-10-CM

## 2024-02-28 DIAGNOSIS — E03.9 ACQUIRED HYPOTHYROIDISM: ICD-10-CM

## 2024-02-28 DIAGNOSIS — M94.9 DISORDER OF BONE AND CARTILAGE: ICD-10-CM

## 2024-02-28 DIAGNOSIS — K92.2 UPPER GI BLEEDING: ICD-10-CM

## 2024-02-28 PROBLEM — Z87.81 HISTORY OF PELVIC FRACTURE: Status: ACTIVE | Noted: 2023-06-10

## 2024-02-28 LAB
CREAT UR-MCNC: 118 MG/DL
HBA1C MFR BLD: 6.4 % (ref 0–5.6)
MICROALBUMIN UR-MCNC: 26.1 MG/L
MICROALBUMIN/CREAT UR: 22.12 MG/G CR (ref 0–25)
VIT D+METAB SERPL-MCNC: 58 NG/ML (ref 20–50)

## 2024-02-28 PROCEDURE — 82043 UR ALBUMIN QUANTITATIVE: CPT | Performed by: INTERNAL MEDICINE

## 2024-02-28 PROCEDURE — 99214 OFFICE O/P EST MOD 30 MIN: CPT | Mod: 25 | Performed by: INTERNAL MEDICINE

## 2024-02-28 PROCEDURE — G0439 PPPS, SUBSEQ VISIT: HCPCS | Performed by: INTERNAL MEDICINE

## 2024-02-28 PROCEDURE — 83036 HEMOGLOBIN GLYCOSYLATED A1C: CPT | Performed by: INTERNAL MEDICINE

## 2024-02-28 PROCEDURE — 82306 VITAMIN D 25 HYDROXY: CPT | Performed by: INTERNAL MEDICINE

## 2024-02-28 PROCEDURE — 36415 COLL VENOUS BLD VENIPUNCTURE: CPT | Performed by: INTERNAL MEDICINE

## 2024-02-28 PROCEDURE — 82570 ASSAY OF URINE CREATININE: CPT | Performed by: INTERNAL MEDICINE

## 2024-02-28 RX ORDER — SERTRALINE HYDROCHLORIDE 25 MG/1
TABLET, FILM COATED ORAL
Qty: 270 TABLET | Refills: 3 | Status: SHIPPED | OUTPATIENT
Start: 2024-02-28

## 2024-02-28 RX ORDER — LEVOTHYROXINE SODIUM 75 UG/1
75 TABLET ORAL DAILY
Qty: 90 TABLET | Refills: 3 | Status: SHIPPED | OUTPATIENT
Start: 2024-02-28

## 2024-02-28 RX ORDER — RESPIRATORY SYNCYTIAL VIRUS VACCINE 120MCG/0.5
0.5 KIT INTRAMUSCULAR ONCE
Qty: 1 EACH | Refills: 0 | Status: CANCELLED | OUTPATIENT
Start: 2024-02-28 | End: 2024-02-28

## 2024-02-28 RX ORDER — SIMVASTATIN 40 MG
TABLET ORAL
Qty: 90 TABLET | Refills: 3 | Status: SHIPPED | OUTPATIENT
Start: 2024-02-28

## 2024-02-28 RX ORDER — FERROUS SULFATE 325(65) MG
325 TABLET, DELAYED RELEASE (ENTERIC COATED) ORAL EVERY OTHER DAY
Qty: 45 TABLET | Refills: 1 | Status: SHIPPED | OUTPATIENT
Start: 2024-02-28 | End: 2024-09-27

## 2024-02-28 RX ORDER — VALACYCLOVIR HYDROCHLORIDE 500 MG/1
500 TABLET, FILM COATED ORAL 2 TIMES DAILY
Qty: 24 TABLET | Refills: 3 | Status: SHIPPED | OUTPATIENT
Start: 2024-02-28 | End: 2024-03-02

## 2024-02-28 RX ORDER — ALENDRONATE SODIUM 70 MG/1
70 TABLET ORAL
Qty: 13 TABLET | Refills: 3 | Status: SHIPPED | OUTPATIENT
Start: 2024-02-28 | End: 2024-09-09

## 2024-02-28 ASSESSMENT — PAIN SCALES - GENERAL: PAINLEVEL: NO PAIN (0)

## 2024-02-28 NOTE — PROGRESS NOTES
Preventive Care Visit  [facility] CARLITA Doherty MD, Internal Medicine - Pediatrics  Feb 28, 2024  {Provider  Link to SmartSet :598450}  {PROVIDER CHARTING PREFERENCE:391083}    Amado Delgado is a 78 year old, presenting for the following:  Physical        2/28/2024     8:18 AM   Additional Questions   Roomed by Luz Aguilera   Accompanied by NA   {ROOMER positive Fall Risk- Gait Speed Test is required click here to document the Gait Speed Test and then refresh the note to pull in results  :759723}  {ROOMER if patient is in their first year of Medicare a vision screen is required click here to document the Vison screen and then refresh the note to pull in results  :168772}    Health Care Directive  Patient has a Health Care Directive on file  {(AWV REQUIRED) Advanced Care Planning Reviewed:622230}    HPI  ***  {MA/LPN/RN Pre-Provider Visit Orders- hCG/UA/Strep (Optional):138782}  {SUPERLIST (Optional):924972}  {additonal problems for provider to add (Optional):295730}      2/22/2024   General Health   How would you rate your overall physical health? Good   Feel stress (tense, anxious, or unable to sleep) Only a little   (!) STRESS CONCERN      2/22/2024   Nutrition   Diet: Low salt    Low fat/cholesterol         2/22/2024   Exercise   Days per week of moderate/strenous exercise 2 days   Average minutes spent exercising at this level 30 min   (!) EXERCISE CONCERN      2/22/2024   Social Factors   Frequency of gathering with friends or relatives Three times a week   Worry food won't last until get money to buy more No   Food not last or not have enough money for food? No   Do you have housing?  Yes   Are you worried about losing your housing? No   Lack of transportation? No   Unable to get utilities (heat,electricity)? No         2/22/2024   Fall Risk   Fallen 2 or more times in the past year? Yes   Trouble with walking or balance? Yes     {Positive Fall Risk- Gait Speed Test is  required and was not documented before note was started.  If results do not appear, ask staff to complete.  Once completed, refresh note to pull in results Click here to link Gait Speed Test  :808153}      2/22/2024   Activities of Daily Living- Home Safety   Needs help with the following daily activites None of the above   Safety concerns in the home None of the above         2/22/2024   Dental   Dentist two times every year? (!) NO         2/22/2024   Hearing Screening   Hearing concerns? None of the above         2/22/2024   Driving Risk Screening   Patient/family members have concerns about driving No         2/22/2024   General Alertness/Fatigue Screening   Have you been more tired than usual lately? No         2/22/2024   Urinary Incontinence Screening   Bothered by leaking urine in past 6 months No         2/22/2024   TB Screening   Were you born outside of [place]?  No       Today's PHQ-9 Score:       2/27/2024     8:38 AM   PHQ-9 SCORE   PHQ-9 Total Score MyChart 0   PHQ-9 Total Score 0         2/22/2024   Substance Use   Alcohol more than 3/day or more than 7/wk No   Do you have a current opioid prescription? No   How severe/bad is pain from 1 to 10? 2/10   Do you use any other substances recreationally? No     Social History     Tobacco Use    Smoking status: Never    Smokeless tobacco: Never   Substance Use Topics    Alcohol use: Yes     Alcohol/week: 5.0 standard drinks of alcohol    Drug use: No     {Provider  If there are gaps in the social history shown above, please follow the link to update and then refresh the note Link to Social and Substance History :594200}      11/10/2022   LAST FHS-7 RESULTS   1st degree relative breast or ovarian cancer No   Any relative bilateral breast cancer No   Any male have breast cancer No   Any ONE woman have BOTH breast AND ovarian cancer No   Any woman with breast cancer before 50yrs No   2 or more relatives with breast AND/OR ovarian cancer No   2 or more relatives  with breast AND/OR bowel cancer No     {If any of the questions to the FHS7 are answered yes, consider referral for genetic counseling.    Additional indications for genetic referral include personal history of breast or ovarian cancer, genetic mutation in 1st degree relative which increases risk of breast cancer including BRCA1, BRCA2, FRANKY, PALB 2, TP53, CHEK2, PTEN, CDH1, STK11 (per ACS) and/or 1st degree relative with history of pancreatic or high-risk prostate cancer (per NCCN):191174}   {Mammogram Decision Support (Optional):169584}    ASCVD Risk   The 10-year ASCVD risk score (Canelo RANGEL, et al., 2019) is: 19.9%    Values used to calculate the score:      Age: 78 years      Sex: Female      Is Non- : No      Diabetic: No      Tobacco smoker: No      Systolic Blood Pressure: 128 mmHg      Is BP treated: No      HDL Cholesterol: 63 mg/dL      Total Cholesterol: 132 mg/dL    {Link to Fracture Risk Assessment Tool (Optional):745750}    {Provider  Use the storyboard to review patient history, after sections have been marked as reviewed, refresh note to capture documentation:439780}  {Provider   REQUIRED AWV use this link to review and update sexual activity history  after section has been marked as reviewed, refresh note to capture documentation:395242}  Reviewed and updated as needed this visit by Provider                    {HISTORY OPTIONS (Optional):600859}  Current providers sharing in care for this patient include:  Patient Care Team:  Feng Doherty MD as PCP - General (Internal Medicine - Pediatrics)  Feng Doherty MD as Assigned PCP  Itzel Hanson MD as Assigned Heart and Vascular Provider    The following health maintenance items are reviewed in Epic and correct as of today:  Health Maintenance   Topic Date Due    RSV VACCINE (Pregnancy & 60+) (1 - 1-dose 60+ series) Never done    ANNUAL REVIEW OF HM ORDERS  12/08/2022    COLORECTAL CANCER  "SCREENING  10/15/2023    MEDICARE ANNUAL WELLNESS VISIT  01/12/2024    PHQ-9  08/28/2024    DEXA  12/30/2024    TSH W/FREE T4 REFLEX  01/25/2025    FALL RISK ASSESSMENT  02/28/2025    GLUCOSE  01/25/2027    ADVANCE CARE PLANNING  06/12/2028    LIPID  01/25/2029    DTAP/TDAP/TD IMMUNIZATION (3 - Td or Tdap) 12/08/2031    INFLUENZA VACCINE  Completed    Pneumococcal Vaccine: 65+ Years  Completed    ZOSTER IMMUNIZATION  Completed    COVID-19 Vaccine  Completed    IPV IMMUNIZATION  Aged Out    HPV IMMUNIZATION  Aged Out    MENINGITIS IMMUNIZATION  Aged Out    RSV MONOCLONAL ANTIBODY  Aged Out    HEPATITIS C SCREENING  Discontinued    MAMMO SCREENING  Discontinued       {ROS Picklists (Optional):331742}     Objective    Exam  There were no vitals taken for this visit.   Estimated body mass index is 25.96 kg/m  as calculated from the following:    Height as of 6/22/23: 1.549 m (5' 1\").    Weight as of 12/15/23: 62.3 kg (137 lb 6.4 oz).    Physical Exam  {Exam Choices (Optional):856806}  {Provider  The Mini-Cog is incomplete, use link to complete and refresh note Link to Mini-Cog :457571}       No data to display              {A Mini-Cog total score of 0-2 suggests the possibility of dementia, score of 3-5 suggests no dementia:108026}       Signed Electronically by: Feng Doherty MD  {Email feedback regarding this note to [email or web address]   :283081}  Answers submitted by the patient for this visit:  Patient Health Questionnaire (Submitted on 2/27/2024)  PHQ9 TOTAL SCORE: 0    "

## 2024-02-28 NOTE — PROGRESS NOTES
Preventive Care Visit  [facility] CARLITA Doherty MD, Internal Medicine - Pediatrics  Feb 28, 2024    Assessment & Plan     (Z00.00) Encounter for Medicare annual wellness exam  (primary encounter diagnosis)  Considering RSV at pharmacy.    (I25.10) Atherosclerosis of native coronary artery of native heart without angina pectoris  Comment: Doing well, follows w/ Cardiology  Plan: simvastatin (ZOCOR) 40 MG tablet  ASA    (E11.9) Type 2 diabetes mellitus without complication, without long-term current use of insulin (H)  Comment: A1c on repeat still just above normal.   Plan: Adult Diabetes Education  Referral,         Hemoglobin A1c, Albumin Random Urine         Quantitative with Creat Ratio    (E78.2) Mixed hyperlipidemia  Plan: simvastatin (ZOCOR) 40 MG tablet    (E03.9) Acquired hypothyroidism  TSH   Date Value Ref Range Status   01/25/2024 1.34 0.30 - 4.20 uIU/mL Final   12/08/2021 1.19 0.30 - 5.00 uIU/mL Final     Plan: levothyroxine (SYNTHROID/LEVOTHROID) 75 MCG         tablet    (K92.2) Upper GI bleeding  (K25.4) Chronic gastric ulcer with hemorrhage - resolved on UGI in 8/2023  Continue supplement for now. Ferritin still low/normal.  Plan: ferrous sulfate (FE TABS) 325 (65 Fe) MG EC         tablet    (Z86.19) History of cold sores  Plan: valACYclovir (VALTREX) 500 MG tablet    (M89.9,  M94.9) Osteopenia - fosamax 2/24 (on decades ago for 1 year)  Comment: Last dexa just qualified for treatment and subsequently had cracture.   Plan: alendronate (FOSAMAX) 70 MG tablet    (E55.9) Vitamin D deficiency  Vitamin D Deficiency Screening Results:  Lab Results   Component Value Date    VITDT 69 (H) 01/25/2024    VITDT 50 01/05/2023     - lowered dose.  Plan: Vitamin D Deficiency          (D32.0) Meningioma, small in 2012 - saw NSG and told that she did not need monitoring. (H)    (F41.9) Anxiety  Doing well, no plan to wean.  Plan: sertraline (ZOLOFT) 25 MG tablet    (Z12.11)  "Screen for colon cancer  Comment: Has colonoscopy scheduled in April.    (Z12.31) Encounter for screening mammogram for breast cancer  Plan: *MA Screening Digital Bilateral    BMI  Estimated body mass index is 26.87 kg/m  as calculated from the following:    Height as of this encounter: 1.54 m (5' 0.63\").    Weight as of this encounter: 63.7 kg (140 lb 8 oz).     Counseling  Appropriate preventive services were discussed with this patient, including applicable screening as appropriate for fall prevention, nutrition, physical activity, Tobacco-use cessation, weight loss and cognition.  Checklist reviewing preventive services available has been given to the patient.  Reviewed patient's diet, addressing concerns and/or questions.   She is at risk for lack of exercise and has been provided with information to increase physical activity for the benefit of her well-being.   The patient was instructed to see the dentist every 6 months.   She is at risk for psychosocial distress and has been provided with information to reduce risk.     Amado Delgado is a 78 year old, presenting for the following:  Physical        2/28/2024     8:18 AM   Additional Questions   Roomed by Luz Aguilera   Accompanied by JOHNNIE         Health Care Directive  Patient has a Health Care Directive on file  Advance care planning document is on file and is current.    HPI    Overall doing well  Still doesn't have full strength in her lower extremities - uses a walking stick.   [facility]  Ortho people mentioned fosamax, she was on it previously for about a year and then was told to stop it bc it got better.      # CAD s/p PCI of RCA in 2012  - asa 82  - simvastatin 40    # Diabetes  Lab Results   Component Value Date    A1C 6.5 01/25/2024    A1C 6.6 10/27/2023    A1C 5.9 08/01/2013    A1C 6.3 05/24/2012     # HLD  - simvastatin 40    # Hypothyroid  TSH   Date Value Ref Range Status   01/25/2024 1.34 0.30 - 4.20 uIU/mL Final "   12/08/2021 1.19 0.30 - 5.00 uIU/mL Final   - synthoid    # Chronic gastric ulcer w/ hemorrhoid - resolve don UGI in 8/2023   Latest Reference Range & Units 01/25/24 12:36   Ferritin 11 - 328 ng/mL 40      Latest Reference Range & Units 06/17/23 06:08 06/23/23 06:31 06/26/23 06:26 08/15/23 14:00 10/27/23 10:54 01/25/24 12:36   Hemoglobin 11.7 - 15.7 g/dL 7.9 (L) 8.5 (L) 9.0 (L) 11.0 (L) 12.2 13.1   (L): Data is abnormally low  - iron  325 qod    # Vitamin d def  - calcium/d  - vitamin d  Vitamin D Deficiency Screening Results:  Lab Results   Component Value Date    VITDT 69 (H) 01/25/2024    VITDT 50 01/05/2023     # Mental health  # Anxiety      12/5/2022     4:05 PM 6/15/2023    10:57 AM 2/27/2024     8:38 AM   PHQ   PHQ-9 Total Score 0 0 0   Q9: Thoughts of better off dead/self-harm past 2 weeks Not at all Not at all Not at all         12/5/2022     4:06 PM   KASH-7 SCORE   Total Score 0   - zoloft 75 mg daily    # Meningioma, small 2012  - NSG said no monitoring    # Cold sores  - valtrex prn    # HCM  - colonoscopy in April, 2024 (polyp)        2/22/2024   General Health   How would you rate your overall physical health? Good   Feel stress (tense, anxious, or unable to sleep) Only a little   (!) STRESS CONCERN      2/22/2024   Nutrition   Diet: Low salt    Low fat/cholesterol         2/22/2024   Exercise   Days per week of moderate/strenous exercise 2 days   Average minutes spent exercising at this level 30 min   (!) EXERCISE CONCERN      2/22/2024   Social Factors   Frequency of gathering with friends or relatives Three times a week   Worry food won't last until get money to buy more No   Food not last or not have enough money for food? No   Do you have housing?  Yes   Are you worried about losing your housing? No   Lack of transportation? No   Unable to get utilities (heat,electricity)? No         2/28/2024   Fall Risk   Gait Speed Test (Document in seconds) 4.81   Gait Speed Test Interpretation Less than or  equal to 5.00 seconds - PASS          2/22/2024   Activities of Daily Living- Home Safety   Needs help with the following daily activites None of the above   Safety concerns in the home None of the above         2/22/2024   Dental   Dentist two times every year? (!) NO         2/22/2024   Hearing Screening   Hearing concerns? None of the above         2/22/2024   Driving Risk Screening   Patient/family members have concerns about driving No         2/22/2024   General Alertness/Fatigue Screening   Have you been more tired than usual lately? No         2/22/2024   Urinary Incontinence Screening   Bothered by leaking urine in past 6 months No         2/22/2024   TB Screening   Were you born outside of [place]?  No       Today's PHQ-9 Score:       2/27/2024     8:38 AM   PHQ-9 SCORE   PHQ-9 Total Score MyChart 0   PHQ-9 Total Score 0         2/22/2024   Substance Use   Alcohol more than 3/day or more than 7/wk No   Do you have a current opioid prescription? No   How severe/bad is pain from 1 to 10? 2/10   Do you use any other substances recreationally? No     Social History     Tobacco Use    Smoking status: Never    Smokeless tobacco: Never    Tobacco comments:     None   Vaping Use    Vaping Use: Never used   Substance Use Topics    Alcohol use: Yes     Alcohol/week: 5.0 standard drinks of alcohol    Drug use: No           11/10/2022   LAST FHS-7 RESULTS   1st degree relative breast or ovarian cancer No   Any relative bilateral breast cancer No   Any male have breast cancer No   Any ONE woman have BOTH breast AND ovarian cancer No   Any woman with breast cancer before 50yrs No   2 or more relatives with breast AND/OR ovarian cancer No   2 or more relatives with breast AND/OR bowel cancer No        Mammogram Screening - After age 74- determine frequency with patient based on health status, life expectancy and patient goals    ASCVD Risk   The 10-year ASCVD risk score (Canelo RANGEL, et al., 2019) is: 17.7%    Values  "used to calculate the score:      Age: 78 years      Sex: Female      Is Non- : No      Diabetic: No      Tobacco smoker: No      Systolic Blood Pressure: 120 mmHg      Is BP treated: No      HDL Cholesterol: 63 mg/dL      Total Cholesterol: 132 mg/dL          Reviewed and updated as needed this visit by Provider                    Current providers sharing in care for this patient include:  Patient Care Team:  Feng Doherty MD as PCP - General (Internal Medicine - Pediatrics)  Feng Doherty MD as Assigned PCP  Itzel Hanson MD as Assigned Heart and Vascular Provider    The following health maintenance items are reviewed in Epic and correct as of today:  Health Maintenance   Topic Date Due    RSV VACCINE (Pregnancy & 60+) (1 - 1-dose 60+ series) Never done    ANNUAL REVIEW OF HM ORDERS  12/08/2022    COLORECTAL CANCER SCREENING  10/15/2023    MEDICARE ANNUAL WELLNESS VISIT  01/12/2024    PHQ-9  08/28/2024    DEXA  12/30/2024    TSH W/FREE T4 REFLEX  01/25/2025    FALL RISK ASSESSMENT  02/28/2025    GLUCOSE  01/25/2027    ADVANCE CARE PLANNING  06/12/2028    LIPID  01/25/2029    DTAP/TDAP/TD IMMUNIZATION (3 - Td or Tdap) 12/08/2031    INFLUENZA VACCINE  Completed    Pneumococcal Vaccine: 65+ Years  Completed    ZOSTER IMMUNIZATION  Completed    COVID-19 Vaccine  Completed    IPV IMMUNIZATION  Aged Out    HPV IMMUNIZATION  Aged Out    MENINGITIS IMMUNIZATION  Aged Out    RSV MONOCLONAL ANTIBODY  Aged Out    HEPATITIS C SCREENING  Discontinued    MAMMO SCREENING  Discontinued            Objective    Exam  /56 (BP Location: Right arm, Patient Position: Sitting, Cuff Size: Adult Regular)   Pulse 78   Temp 97  F (36.1  C) (Tympanic)   Resp 12   Ht 1.54 m (5' 0.63\")   Wt 63.7 kg (140 lb 8 oz)   SpO2 98%   BMI 26.87 kg/m     Estimated body mass index is 26.87 kg/m  as calculated from the following:    Height as of this encounter: 1.54 m (5' 0.63\").    Weight " as of this encounter: 63.7 kg (140 lb 8 oz).    Physical Exam  GENERAL: alert and no distress  EYES: Eyes grossly normal to inspection, PERRL and conjunctivae and sclerae normal  HENT: ear canals and TM's normal, nose and mouth without ulcers or lesions  NECK: no adenopathy, no asymmetry, masses, or scars  RESP: lungs clear to auscultation - no rales, rhonchi or wheezes  CV: regular rate and rhythm, normal S1 S2, no S3 or S4, no murmur, click or rub, no peripheral edema  ABDOMEN: soft, nontender, no hepatosplenomegaly, no masses and bowel sounds normal  MS: no gross musculoskeletal defects noted, no edema  SKIN: no suspicious lesions or rashes  NEURO: Normal strength and tone, mentation intact and speech normal  PSYCH: mentation appears normal, affect normal/bright        2/28/2024   Mini Cog   Clock Draw Score 2 Normal   3 Item Recall 3 objects recalled   Mini Cog Total Score 5            Signed Electronically by: Feng Doherty MD    Answers submitted by the patient for this visit:  Patient Health Questionnaire (Submitted on 2/27/2024)  PHQ9 TOTAL SCORE: 0

## 2024-02-28 NOTE — PATIENT INSTRUCTIONS
Continue your iron supplement for now.      Schedule with the eye doctor (rhaeem given the blurriness) and have them also do a diabetes eye exam. Have them send me a copy of their results.     Check blood sugars maybe 1-2 x a week, more if running high.   - Check your blood sugars   - Before Breakfast (fasting) Glucose Level Goal  mg/dl   - 2 hours After Meal Glucose Level Goal < 180 mg/dl    I think we can manage your sugars for quite some time (hopefully) with nutrition and staying active like you do. Goal A1c <7.0ish.   Preventive Care Advice   This is general advice given by our system to help you stay healthy. However, your care team may have specific advice just for you. Please talk to your care team about your preventive care needs.  Nutrition  Eat 5 or more servings of fruits and vegetables each day.  Try wheat bread, brown rice and whole grain pasta (instead of white bread, rice, and pasta).  Get enough calcium and vitamin D. Check the label on foods and aim for 100% of the RDA (recommended daily allowance).  Lifestyle  Exercise at least 150 minutes each week   (30 minutes a day, 5 days a week).  Do muscle strengthening activities 2 days a week. These help control your weight and prevent disease.  No smoking.  Wear sunscreen to prevent skin cancer.  Have a dental exam and cleaning every 6 months.  Yearly exams  See your health care team every year to talk about:  Any changes in your health.  Any medicines your care team has prescribed.  Preventive care, family planning, and ways to prevent chronic diseases.  Shots (vaccines)   HPV shots (up to age 26), if you've never had them before.  Hepatitis B shots (up to age 59), if you've never had them before.  COVID-19 shot: Get this shot when it's due.  Flu shot: Get a flu shot every year.  Tetanus shot: Get a tetanus shot every 10 years.  Pneumococcal, hepatitis A, and RSV shots: Ask your care team if you need these based on your risk.  Shingles shot (for age  50 and up).  General health tests  Diabetes screening:  Starting at age 35, Get screened for diabetes at least every 3 years.  If you are younger than age 35, ask your care team if you should be screened for diabetes.  Cholesterol test: At age 39, start having a cholesterol test every 5 years, or more often if advised.  Bone density scan (DEXA): At age 50, ask your care team if you should have this scan for osteoporosis (brittle bones).  Hepatitis C: Get tested at least once in your life.  STIs (sexually transmitted infections)  Before age 24: Ask your care team if you should be screened for STIs.  After age 24: Get screened for STIs if you're at risk. You are at risk for STIs (including HIV) if:  You are sexually active with more than one person.  You don't use condoms every time.  You or a partner was diagnosed with a sexually transmitted infection.  If you are at risk for HIV, ask about PrEP medicine to prevent HIV.  Get tested for HIV at least once in your life, whether you are at risk for HIV or not.  Cancer screening tests  Cervical cancer screening: If you have a cervix, begin getting regular cervical cancer screening tests at age 21. Most people who have regular screenings with normal results can stop after age 65. Talk about this with your provider.  Breast cancer scan (mammogram): If you've ever had breasts, begin having regular mammograms starting at age 40. This is a scan to check for breast cancer.  Colon cancer screening: It is important to start screening for colon cancer at age 45.  Have a colonoscopy test every 10 years (or more often if you're at risk) Or, ask your provider about stool tests like a FIT test every year or Cologuard test every 3 years.  To learn more about your testing options, visit: https://[email or web address]/964528.pdf.  For help making a decision, visit: https://bit.ly/bx32761.  Prostate cancer screening test: If you have a prostate and are age 55 to 69, ask your provider if you  would benefit from a yearly prostate cancer screening test.  Lung cancer screening: If you are a current or former smoker age 50 to 80, ask your care team if ongoing lung cancer screenings are right for you.  For informational purposes only. Not to replace the advice of your health care provider. Copyright   2023 [employer]. All rights reserved. Clinically reviewed by the [facility] Transitions Program. [employer] [id number] - REV 01/24.    Preventing Falls: Care Instructions  Injuries and health problems such as trouble walking or poor eyesight can increase your risk of falling. So can some medicines. But there are things you can do to help prevent falls. You can exercise to get stronger. You can also arrange your home to make it safer.    Talk to your doctor about the medicines you take. Ask if any of them increase the risk of falls and whether they can be changed or stopped.   Try to exercise regularly. It can help improve your strength and balance. This can help lower your risk of falling.     Practice fall safety and prevention.    Wear low-heeled shoes that fit well and give your feet good support. Talk to your doctor if you have foot problems that make this hard.  Carry a cellphone or wear a medical alert device that you can use to call for help.  Use stepladders instead of chairs to reach high objects. Don't climb if you're at risk for falls. Ask for help, if needed.  Wear the correct eyeglasses, if you need them.    Make your home safer.    Remove rugs, cords, clutter, and furniture from walkways.  Keep your house well lit. Use night-lights in hallways and bathrooms.  Install and use sturdy handrails on stairways.  Wear nonskid footwear, even inside. Don't walk barefoot or in socks without shoes.    Be safe outside.    Use handrails, curb cuts, and ramps whenever possible.  Keep your hands free by using a shoulder bag or backpack.  Try to walk in well-lit areas. Watch out for uneven ground,  "changes in pavement, and debris.  Be careful in the winter. Walk on the grass or gravel when sidewalks are slippery. Use de-icer on steps and walkways. Add non-slip devices to shoes.    Put grab bars and nonskid mats in your shower or tub and near the toilet. Try to use a shower chair or bath bench when bathing.   Get into a tub or shower by putting in your weaker leg first. Get out with your strong side first. Have a phone or medical alert device in the bathroom with you.   Where can you learn more?  Go to https://www.Table8.net/patiented  Enter G117 in the search box to learn more about \"Preventing Falls: Care Instructions.\"  Current as of: July 18, 2023               Content Version: 13.8    4292-5285 [employer].   Care instructions adapted under license by your healthcare professional. If you have questions about a medical condition or this instruction, always ask your healthcare professional. [employer], [employer] disclaims any warranty or liability for your use of this information.      "

## 2024-03-04 ENCOUNTER — TRANSFERRED RECORDS (OUTPATIENT)
Dept: HEALTH INFORMATION MANAGEMENT | Facility: CLINIC | Age: 79
End: 2024-03-04
Payer: COMMERCIAL

## 2024-03-04 ENCOUNTER — HOSPITAL ENCOUNTER (OUTPATIENT)
Dept: MAMMOGRAPHY | Facility: CLINIC | Age: 79
Discharge: HOME | End: 2024-03-04
Attending: INTERNAL MEDICINE | Admitting: INTERNAL MEDICINE
Payer: COMMERCIAL

## 2024-03-04 DIAGNOSIS — Z12.31 ENCOUNTER FOR SCREENING MAMMOGRAM FOR BREAST CANCER: ICD-10-CM

## 2024-03-04 PROCEDURE — 77063 BREAST TOMOSYNTHESIS BI: CPT

## 2024-03-05 ENCOUNTER — OFFICE VISIT (OUTPATIENT)
Dept: EDUCATION SERVICES | Facility: CLINIC | Age: 79
End: 2024-03-05
Attending: INTERNAL MEDICINE
Payer: COMMERCIAL

## 2024-03-05 DIAGNOSIS — E11.9 TYPE 2 DIABETES MELLITUS WITHOUT COMPLICATION, WITHOUT LONG-TERM CURRENT USE OF INSULIN (H): ICD-10-CM

## 2024-03-05 PROCEDURE — G0108 DIAB MANAGE TRN  PER INDIV: HCPCS | Mod: AE | Performed by: DIETITIAN, REGISTERED

## 2024-03-05 NOTE — PROGRESS NOTES
Diabetes Self-Management Education & Support    Presents for: Initial Assessment for new diagnosis    Type of Service: In Person Visit      ASSESSMENT:  Patient presents to clinic today to assess/assist with current diabetes self management skills. Patient arrived today unaccompanied. Patient is not currently on any BG lowering medications and is not checking BG at home. Intake was reviewed and patient is currently eating 2-3 meals and 1-2 snacks per day. Areas of opportunity include adding protein with each carbohydrate source consumed, eating consistent meals, reducing carbohydrate at some meals. Activity is going to be progressively increasing as patient has [employer] membership. Plans to work with a  2 days per week for 30 minutes on resistance training for 3 months. Encouraged patient to do what they are able to tolerate while remaining safe. Patient has been ambulating with a walking stick due to some weakness in legs, and overall decrease in muscle tone after various surgery / injury over the last few years. Patient had eye exam yesterday with no signs of retinopathy. Patient noted low hbg since surgery in June of last year. Has progressively improved. Discussed iron rich foods today.     DIET HISTORY: wakes ~8AM, bed ~10:30-11PM  Beverages: water    Breakfast: Bagel (white) with pb, berries, water  Lunch: apple  Dinner: pasta, wheat bread with canola oil spread, water    Breakfast: bagel with PB, berries, tangerine, scrambled egg  Lunch: squash soup, corn chips (handful), shamrock shake  Dinner: 1/2 banana, apple sauce pouch, 1 oz omega 3 trail mix    Breakfast: banana yogurt covered raisins  Lunch: pepperoni pizza small, carrots, black berries, water  Dinner: chicken parm w pasta, salad, water  Snack: pistachios and caramel corn    Breakfast: oatmeal with brown sugar      Prior to todays appointment I reviewed their most recent office visit notes as well as lab results.    Patient's most recent    Lab Results   Component Value Date    A1C 6.4 02/28/2024     is meeting goal of  7.5%    Diabetes knowledge and skills assessment:   Patient is knowledgeable in diabetes management concepts related to: review in all    Continue education with the following diabetes management concepts: Healthy Eating, Being Active, Monitoring, Problem Solving, Reducing Risks, and Healthy Coping    Based on learning assessment above, most appropriate setting for further diabetes education would be: Individual setting.      PLAN  1. Eat 3 balanced meals each day - Monitor carbohydrate intake and aim for:  45-60 grams per meal or 3-4 carbohydrate choices ... 1 choice = 15 grams   For snacks, aim for 15-30 grams of carbohydrate or 1-2 choices.     2. Do not wait longer than 4-5 hours to eat something during awake hours. Try to reduce fasting time by eating earlier in the morning after waking or having a balanced bedtime snack. Try to include a protein source with each meal and snack - this has been shown to help with blood sugar elevations.  Simple protein choices: nuts, seeds, peanut butter, cheese stick, sliced cheese, cottage cheese, hard boiled egg, cold cut deli meat, Greek yogurt, protein shake    3. Incorporate 30 minutes of activity into each day - it does not need to be all at one time & walking counts!    4. Aiming for 8 milligrams iron per day from dietary intake. See handout on iron content of foods.     Glucometer teaching should A1c return higher than previous.       Topics to cover at upcoming visits: Healthy Eating, Being Active, Problem Solving, Reducing Risks, and Healthy Coping    Follow-up: 5/28 for A1c check and 6/4 with [facility]    See Care Plan for co-developed, patient-state behavior change goals.  AVS provided for patient today.    Education Materials Provided:  Goals for Your Diabetes Care, My Plate Planner, and Iron Deficiency Nutrition Therapy (NCM)      SUBJECTIVE/OBJECTIVE:  Presents for: Initial Assessment  "for new diagnosis  Accompanied by: Self  Diabetes education in the past 24mo: No  Focus of Visit: Patient Unsure  Diabetes type: Type 2  Date of diagnosis: 2023  Disease course: Stable  Transportation concerns: No  Other concerns:: None  Cultural Influences/Ethnic Background:  Not  or     Diabetes Symptoms & Complications:  Disease course: Stable       Patient Problem List and Family Medical History reviewed for relevant medical history, current medical status, and diabetes risk factors.    Vitals:  There were no vitals taken for this visit.  Estimated body mass index is 26.87 kg/m  as calculated from the following:    Height as of 2/28/24: 1.54 m (5' 0.63\").    Weight as of 2/28/24: 63.7 kg (140 lb 8 oz).   Last 3 BP:   BP Readings from Last 3 Encounters:   02/28/24 120/56   12/15/23 128/60   06/27/23 137/63       History   Smoking Status    Never   Smokeless Tobacco    Never       Labs:  Lab Results   Component Value Date    A1C 6.4 02/28/2024     Lab Results   Component Value Date     01/25/2024    GLC 97 06/16/2023     Lab Results   Component Value Date    LDL 55 01/25/2024     Direct Measure HDL   Date Value Ref Range Status   01/25/2024 63 >=50 mg/dL Final     GFR Estimate   Date Value Ref Range Status   01/25/2024 66 >60 mL/min/1.73m2 Final   10/08/2020 >60 >60 mL/min/1.73m2 Final     GFR Estimate If Black   Date Value Ref Range Status   10/08/2020 >60 >60 mL/min/1.73m2 Final     Lab Results   Component Value Date    CR 0.89 01/25/2024     No results found for: \"MICROALBUMIN\"    Healthy Eating:  Healthy Eating Assessed Today: Yes  Cultural/Rastafarian diet restrictions?: No  Do you have any food allergies or intolerances?: No  Meal planning/habits: Low salt  Who cooks/prepares meals for you?: Self  Who purchases food in  your home?: Self  Meals include: Breakfast, Lunch, Dinner  Beverages: Water  Has patient met with a dietitian in the past?: No    Being Active:  Being Active Assessed " Today: Yes  Exercise:: Yes  Days per week of moderate to strenuous exercise (like a brisk walk): 2  On average, minutes per day of exercise at this level: 30  How intense was your typical exercise? : Moderate (like brisk walking)  Exercise Minutes per Week: 60  Barrier to exercise: None    Monitoring:  Monitoring Assessed Today: Yes  Times checking blood sugar at home (number): Never    Taking Medications:  Taking Medication Assessed Today: Yes  Current Treatments: None    Problem Solving:  Is the patient at risk for hypoglycemia?: No  Is the patient at risk for DKA?: No  Does patient have severe weather/disaster plan for diabetes management?: Not Needed  Does patient have sick day plan for diabetes management?: Not Needed       Reducing Risks:  Has dilated eye exam at least once a year?: Yes    Healthy Coping:  Healthy Coping Assessed Today: Yes  Emotional response to diabetes: Ready to learn  Informal Support system:: Family, Friends  Stage of change: PREPARATION (Decided to change - considering how)  Patient Activation Measure Survey Score:      12/4/2022     2:43 PM   DANDRE Score (Last Two)   DANDRE Raw Score 40   Activation Score 100   DANDRE Level 4         Care Plan and Education Provided:  Care Plan: Diabetes   Updates made by Cortney Hampton RD since 3/5/2024 12:00 AM        Problem: HbA1C Not In Goal         Goal: Establish Regular Follow-Ups with PCP         Task: Discuss with PCP the recommended timing for patient's next follow up visit(s)    Responsible User: Cortney Hampton RD        Task: Discuss schedule for PCP visits with patient    Responsible User: Cortney Hampton RD        Goal: Get HbA1C Level in Goal         Task: Educate patient on diabetes education self-management topics    Responsible User: Cortney Hampton RD        Task: Educate patient on benefits of regular glucose monitoring    Responsible User: Cortney Hampton RD        Task: Refer patient to appropriate extended care  team member, as needed (Medication Therapy Management, Behavioral Health, Physical Therapy, etc.)    Responsible User: Cortney Hampton RD        Task: Discuss diabetes treatment plan with patient    Responsible User: Cortney Hampton RD        Problem: Diabetes Self-Management Education Needed to Optimize Self-Care Behaviors         Goal: Understand diabetes pathophysiology and disease progression         Task: Provide education on diabetes pathophysiology and disease progression specfic to patient's diabetes type Completed 3/5/2024   Responsible User: Cortney Hampton RD        Goal: Healthy Eating - follow a healthy eating pattern for diabetes    Note:    Aim for 45-60 grams CHO per meal and 15-30 grams CHO per snack.  Protein each time eating.       Task: Provide education on portion control and consistency in amount, composition and timing of food intake Completed 3/5/2024   Responsible User: Cortney Hampton RD        Task: Provide education on managing carbohydrate intake (carbohydrate counting, plate planning method, etc.) Completed 3/5/2024   Responsible User: Cortney Hampton RD        Task: Provide education on weight management    Responsible User: Cortney Hampton RD        Task: Provide education on heart healthy eating    Responsible User: Cortney Hampton RD        Task: Provide education on eating out    Responsible User: Cortney Hampton RD        Task: Develop individualized healthy eating plan with patient Completed 3/5/2024   Responsible User: Cortney Hampton RD        Goal: Being Active - get regular physical activity, working up to at least 150 minutes per week    Note:    Aim for 30 minutes activity as tolerated most days of the week.  Personal training 2x/week for 30 minutes.       Task: Provide education on relationship of activity to glucose and precautions to take if at risk for low glucose Completed 3/5/2024   Responsible User: Cortney Hampton RD         Task: Discuss barriers to physical activity with patient Completed 3/5/2024   Responsible User: Cortney Hampton RD        Task: Develop physical activity plan with patient    Responsible User: Cortney Hampton RD        Task: Explore community resources including walking groups, assistance programs, and home videos    Responsible User: Cortney Hampton RD        Goal: Monitoring - monitor glucose and ketones as directed         Task: Provide education on blood glucose monitoring (purpose, proper technique, frequency, glucose targets, interpreting results, when to use glucose control solution, sharps disposal)    Responsible User: Cortney Hampton RD        Task: Provide education on continuous glucose monitoring (sensor placement, use of courtney or /reader, understanding glucose trends, alerts and alarms, differences between sensor glucose and blood glucose)    Responsible User: Cortney Hampton RD        Task: Provide education on ketone monitoring (when to monitor, frequency, etc.)    Responsible User: Cortney Hampton RD        Goal: Taking Medication - patient is consistently taking medications as directed         Task: Provide education on action of prescribed medication, including when to take and possible side effects    Responsible User: Cortney Hampton RD        Task: Provide education on insulin and injectable diabetes medications, including administration, storage, site selection and rotation for injection sites    Responsible User: Cortney Hampton RD        Task: Discuss barriers to medication adherence with patient and provide management technique ideas as appropriate    Responsible User: Cortney Hampton RD        Task: Provide education on frequency and refill details of medications    Responsible User: Cortney Hampton RD        Goal: Problem Solving - know how to prevent and manage short-term diabetes complications         Task: Provide education  on high blood glucose - causes, signs/symptoms, prevention and treatment    Responsible User: Cortney Hampton RD        Task: Provide education on low blood glucose - causes, signs/symptoms, prevention, treatment, carrying a carbohydrate source at all times, and medical identification    Responsible User: Cortney Hampton RD        Task: Provide education on safe travel with diabetes    Responsible User: Cortney Hampton RD        Task: Provide education on how to care for diabetes on sick days    Responsible User: Cortney Hampton RD        Task: Provide education on when to call a health care provider    Responsible User: Cortney Hampton RD        Goal: Reducing Risks - know how to prevent and treat long-term diabetes complications         Task: Provide education on major complications of diabetes, prevention, early diagnostic measures and treatment of complications Completed 3/5/2024   Responsible User: Cortney Hampton RD        Task: Provide education on recommended care for dental, eye and foot health Completed 3/5/2024   Responsible User: Cortney Hampton RD        Task: Provide education on Hemoglobin A1c - goals and relationship to blood glucose levels Completed 3/5/2024   Responsible User: Cortney Hampton RD        Task: Provide education on recommendations for heart health - lipid levels and goals, blood pressure and goals, and aspirin therapy, if indicated    Responsible User: Cortney Hampton RD        Task: Provide education on tobacco cessation    Responsible User: Cortney Hampton RD        Goal: Healthy Coping - use available resources to cope with the challenges of managing diabetes         Task: Discuss recognizing feelings about having diabetes    Responsible User: Cortney Hampton RD        Task: Provide education on the benefits of making appropriate lifestyle changes Completed 3/5/2024   Responsible User: Cortney Hampton RD        Task:  Provide education on benefits of utilizing support systems    Responsible User: Cortney Hampton RD        Task: Discuss methods for coping with stress    Responsible User: Cortney Hampton RD        Task: Provide education on when to seek professional counseling    Responsible User: Cortney Hampton RD Kayla McClanahan, JORI, RDN, LD, CDCES  Diabetes     Time Spent: 60 minutes  Encounter Type: Individual    Any diabetes medication dose changes were made via the CDE Protocol per the patient's referring provider. A copy of this encounter was shared with the provider.

## 2024-03-05 NOTE — LETTER
3/5/2024         RE: Rios Michael  [address] [facility] [address]        Dear Colleague,    Thank you for referring your patient, Rios Michael, to the [facility] [place]. Please see a copy of my visit note below.    Diabetes Self-Management Education & Support    Presents for: Initial Assessment for new diagnosis    Type of Service: In Person Visit      ASSESSMENT:  Patient presents to clinic today to assess/assist with current diabetes self management skills. Patient arrived today unaccompanied. Patient is not currently on any BG lowering medications and is not checking BG at home. Intake was reviewed and patient is currently eating 2-3 meals and 1-2 snacks per day. Areas of opportunity include adding protein with each carbohydrate source consumed, eating consistent meals, reducing carbohydrate at some meals. Activity is going to be progressively increasing as patient has [email or web address] membership. Plans to work with a  2 days per week for 30 minutes on resistance training for 3 months. Encouraged patient to do what they are able to tolerate while remaining safe. Patient has been ambulating with a walking stick due to some weakness in legs, and overall decrease in muscle tone after various surgery / injury over the last few years. Patient had eye exam yesterday with no signs of retinopathy. Patient noted low hbg since surgery in June of last year. Has progressively improved. Discussed iron rich foods today.     DIET HISTORY: wakes ~8AM, bed ~10:30-11PM  Beverages: water    Breakfast: Bagel (white) with pb, berries, water  Lunch: apple  Dinner: pasta, wheat bread with canola oil spread, water    Breakfast: bagel with PB, berries, tangerine, scrambled egg  Lunch: squash soup, corn chips (handful), shamrock shake  Dinner: 1/2 banana, apple sauce pouch, 1 oz omega 3 trail mix    Breakfast: banana yogurt covered raisins  Lunch: pepperoni pizza small, carrots, black  berries, water  Dinner: chicken parm w pasta, salad, water  Snack: pistachios and caramel corn    Breakfast: oatmeal with brown sugar      Prior to todays appointment I reviewed their most recent office visit notes as well as lab results.    Patient's most recent   Lab Results   Component Value Date    A1C 6.4 02/28/2024     is meeting goal of  7.5%    Diabetes knowledge and skills assessment:   Patient is knowledgeable in diabetes management concepts related to: review in all    Continue education with the following diabetes management concepts: Healthy Eating, Being Active, Monitoring, Problem Solving, Reducing Risks, and Healthy Coping    Based on learning assessment above, most appropriate setting for further diabetes education would be: Individual setting.      PLAN  1. Eat 3 balanced meals each day - Monitor carbohydrate intake and aim for:  45-60 grams per meal or 3-4 carbohydrate choices ... 1 choice = 15 grams   For snacks, aim for 15-30 grams of carbohydrate or 1-2 choices.     2. Do not wait longer than 4-5 hours to eat something during awake hours. Try to reduce fasting time by eating earlier in the morning after waking or having a balanced bedtime snack. Try to include a protein source with each meal and snack - this has been shown to help with blood sugar elevations.  Simple protein choices: nuts, seeds, peanut butter, cheese stick, sliced cheese, cottage cheese, hard boiled egg, cold cut deli meat, Greek yogurt, protein shake    3. Incorporate 30 minutes of activity into each day - it does not need to be all at one time & walking counts!    4. Aiming for 8 milligrams iron per day from dietary intake. See handout on iron content of foods.     Glucometer teaching should A1c return higher than previous.       Topics to cover at upcoming visits: Healthy Eating, Being Active, Problem Solving, Reducing Risks, and Healthy Coping    Follow-up: 5/28 for A1c check and 6/4 with CDCES    See Care Plan for  "co-developed, patient-state behavior change goals.  AVS provided for patient today.    Education Materials Provided:  Goals for Your Diabetes Care, My Plate Planner, and Iron Deficiency Nutrition Therapy (NCM)      SUBJECTIVE/OBJECTIVE:  Presents for: Initial Assessment for new diagnosis  Accompanied by: Self  Diabetes education in the past 24mo: No  Focus of Visit: Patient Unsure  Diabetes type: Type 2  Date of diagnosis: 2023  Disease course: Stable  Transportation concerns: No  Other concerns:: None  Cultural Influences/Ethnic Background:  Not  or     Diabetes Symptoms & Complications:  Disease course: Stable       Patient Problem List and Family Medical History reviewed for relevant medical history, current medical status, and diabetes risk factors.    Vitals:  There were no vitals taken for this visit.  Estimated body mass index is 26.87 kg/m  as calculated from the following:    Height as of 2/28/24: 1.54 m (5' 0.63\").    Weight as of 2/28/24: 63.7 kg (140 lb 8 oz).   Last 3 BP:   BP Readings from Last 3 Encounters:   02/28/24 120/56   12/15/23 128/60   06/27/23 137/63       History   Smoking Status     Never   Smokeless Tobacco     Never       Labs:  Lab Results   Component Value Date    A1C 6.4 02/28/2024     Lab Results   Component Value Date     01/25/2024    GLC 97 06/16/2023     Lab Results   Component Value Date    LDL 55 01/25/2024     Direct Measure HDL   Date Value Ref Range Status   01/25/2024 63 >=50 mg/dL Final     GFR Estimate   Date Value Ref Range Status   01/25/2024 66 >60 mL/min/1.73m2 Final   10/08/2020 >60 >60 mL/min/1.73m2 Final     GFR Estimate If Black   Date Value Ref Range Status   10/08/2020 >60 >60 mL/min/1.73m2 Final     Lab Results   Component Value Date    CR 0.89 01/25/2024     No results found for: \"MICROALBUMIN\"    Healthy Eating:  Healthy Eating Assessed Today: Yes  Cultural/Jewish diet restrictions?: No  Do you have any food allergies or intolerances?: " No  Meal planning/habits: Low salt  Who cooks/prepares meals for you?: Self  Who purchases food in  your home?: Self  Meals include: Breakfast, Lunch, Dinner  Beverages: Water  Has patient met with a dietitian in the past?: No    Being Active:  Being Active Assessed Today: Yes  Exercise:: Yes  Days per week of moderate to strenuous exercise (like a brisk walk): 2  On average, minutes per day of exercise at this level: 30  How intense was your typical exercise? : Moderate (like brisk walking)  Exercise Minutes per Week: 60  Barrier to exercise: None    Monitoring:  Monitoring Assessed Today: Yes  Times checking blood sugar at home (number): Never    Taking Medications:  Taking Medication Assessed Today: Yes  Current Treatments: None    Problem Solving:  Is the patient at risk for hypoglycemia?: No  Is the patient at risk for DKA?: No  Does patient have severe weather/disaster plan for diabetes management?: Not Needed  Does patient have sick day plan for diabetes management?: Not Needed       Reducing Risks:  Has dilated eye exam at least once a year?: Yes    Healthy Coping:  Healthy Coping Assessed Today: Yes  Emotional response to diabetes: Ready to learn  Informal Support system:: Family, Friends  Stage of change: PREPARATION (Decided to change - considering how)  Patient Activation Measure Survey Score:      12/4/2022     2:43 PM   DANDRE Score (Last Two)   DANDRE Raw Score 40   Activation Score 100   DANDRE Level 4         Care Plan and Education Provided:  Care Plan: Diabetes   Updates made by Cortney Hamptno RD since 3/5/2024 12:00 AM        Problem: HbA1C Not In Goal         Goal: Establish Regular Follow-Ups with PCP         Task: Discuss with PCP the recommended timing for patient's next follow up visit(s)    Responsible User: Cortney Hampton RD        Task: Discuss schedule for PCP visits with patient    Responsible User: Cortney Hampton RD        Goal: Get HbA1C Level in Goal         Task: Educate  patient on diabetes education self-management topics    Responsible User: Cortney Hampton RD        Task: Educate patient on benefits of regular glucose monitoring    Responsible User: Cortney Hampton RD        Task: Refer patient to appropriate extended care team member, as needed (Medication Therapy Management, Behavioral Health, Physical Therapy, etc.)    Responsible User: Cortney Hampton RD        Task: Discuss diabetes treatment plan with patient    Responsible User: Cortney Hampton RD        Problem: Diabetes Self-Management Education Needed to Optimize Self-Care Behaviors         Goal: Understand diabetes pathophysiology and disease progression         Task: Provide education on diabetes pathophysiology and disease progression specfic to patient's diabetes type Completed 3/5/2024   Responsible User: Cortney Hampton RD        Goal: Healthy Eating - follow a healthy eating pattern for diabetes    Note:    Aim for 45-60 grams CHO per meal and 15-30 grams CHO per snack.  Protein each time eating.       Task: Provide education on portion control and consistency in amount, composition and timing of food intake Completed 3/5/2024   Responsible User: Cortney Hampton RD        Task: Provide education on managing carbohydrate intake (carbohydrate counting, plate planning method, etc.) Completed 3/5/2024   Responsible User: Cortney Hampton RD        Task: Provide education on weight management    Responsible User: Cortney Hampton RD        Task: Provide education on heart healthy eating    Responsible User: Cortney Hampton RD        Task: Provide education on eating out    Responsible User: Cortney Hampton RD        Task: Develop individualized healthy eating plan with patient Completed 3/5/2024   Responsible User: Cortney Hampton RD        Goal: Being Active - get regular physical activity, working up to at least 150 minutes per week    Note:    Aim for 30 minutes  activity as tolerated most days of the week.  Personal training 2x/week for 30 minutes.       Task: Provide education on relationship of activity to glucose and precautions to take if at risk for low glucose Completed 3/5/2024   Responsible User: Cortney Hampton RD        Task: Discuss barriers to physical activity with patient Completed 3/5/2024   Responsible User: Cortney Hampton RD        Task: Develop physical activity plan with patient    Responsible User: Cortney Hampton RD        Task: Explore community resources including walking groups, assistance programs, and home videos    Responsible User: Cortney Hampton RD        Goal: Monitoring - monitor glucose and ketones as directed         Task: Provide education on blood glucose monitoring (purpose, proper technique, frequency, glucose targets, interpreting results, when to use glucose control solution, sharps disposal)    Responsible User: Cortney Hampton RD        Task: Provide education on continuous glucose monitoring (sensor placement, use of courtney or /reader, understanding glucose trends, alerts and alarms, differences between sensor glucose and blood glucose)    Responsible User: Cortney Hampton RD        Task: Provide education on ketone monitoring (when to monitor, frequency, etc.)    Responsible User: Cortney Hampton RD        Goal: Taking Medication - patient is consistently taking medications as directed         Task: Provide education on action of prescribed medication, including when to take and possible side effects    Responsible User: Cortney Hampton RD        Task: Provide education on insulin and injectable diabetes medications, including administration, storage, site selection and rotation for injection sites    Responsible User: Cortney Hampton RD        Task: Discuss barriers to medication adherence with patient and provide management technique ideas as appropriate    Responsible User:  Cortney Hampton RD        Task: Provide education on frequency and refill details of medications    Responsible User: Cortney Hampton RD        Goal: Problem Solving - know how to prevent and manage short-term diabetes complications         Task: Provide education on high blood glucose - causes, signs/symptoms, prevention and treatment    Responsible User: Cortney Hampton RD        Task: Provide education on low blood glucose - causes, signs/symptoms, prevention, treatment, carrying a carbohydrate source at all times, and medical identification    Responsible User: Cortney Hampton RD        Task: Provide education on safe travel with diabetes    Responsible User: Cortney Hampton RD        Task: Provide education on how to care for diabetes on sick days    Responsible User: Cortney Hampton RD        Task: Provide education on when to call a health care provider    Responsible User: Cortney Hampton RD        Goal: Reducing Risks - know how to prevent and treat long-term diabetes complications         Task: Provide education on major complications of diabetes, prevention, early diagnostic measures and treatment of complications Completed 3/5/2024   Responsible User: Cortney Hampton RD        Task: Provide education on recommended care for dental, eye and foot health Completed 3/5/2024   Responsible User: Cortney Hampton RD        Task: Provide education on Hemoglobin A1c - goals and relationship to blood glucose levels Completed 3/5/2024   Responsible User: Cortney Hampton RD        Task: Provide education on recommendations for heart health - lipid levels and goals, blood pressure and goals, and aspirin therapy, if indicated    Responsible User: Cortney Hampton RD        Task: Provide education on tobacco cessation    Responsible User: Cortney Hampton RD        Goal: Healthy Coping - use available resources to cope with the challenges of managing diabetes          Task: Discuss recognizing feelings about having diabetes    Responsible User: Cortney Hampton RD        Task: Provide education on the benefits of making appropriate lifestyle changes Completed 3/5/2024   Responsible User: Cortney Hampton RD        Task: Provide education on benefits of utilizing support systems    Responsible User: Cortney Hampton RD        Task: Discuss methods for coping with stress    Responsible User: Cortney Hampton RD        Task: Provide education on when to seek professional counseling    Responsible User: Cortney Hampton RD Kayla McClanahan, JORI, EVERN, LD, [facility]  Diabetes     Time Spent: 60 minutes  Encounter Type: Individual    Any diabetes medication dose changes were made via the CDE Protocol per the patient's referring provider. A copy of this encounter was shared with the provider.

## 2024-03-05 NOTE — PATIENT INSTRUCTIONS
DIABETES REMINDERS:    1. Eat 3 balanced meals each day - Monitor carbohydrate intake and aim for:  45-60 grams per meal or 3-4 carbohydrate choices ... 1 choice = 15 grams   For snacks, aim for 15-30 grams of carbohydrate or 1-2 choices.     2. Do not wait longer than 4-5 hours to eat something during awake hours. Try to reduce fasting time by eating earlier in the morning after waking or having a balanced bedtime snack. Try to include a protein source with each meal and snack - this has been shown to help with blood sugar elevations.  Simple protein choices: nuts, seeds, peanut butter, cheese stick, sliced cheese, cottage cheese, hard boiled egg, cold cut deli meat, Greek yogurt    3. Incorporate 30 minutes of activity into each day - it does not need to be all at one time & walking counts!    4. Aiming for 8 milligrams iron per day from dietary intake. See handout on iron content of foods.     I truly appreciate your feedback on our appointment together. You will either receive an email, text message, or mailing survey about today's appointment. Please fill this out at your soonest convenience so I can continue to improve upon my practice. Thank you!      Contact Information:  Cortney Hampton, JORI, EVERN, LD, [facility]  Diabetes     [phone]  Diabetes Education Care Team: [phone]

## 2024-03-06 ENCOUNTER — MYC MEDICAL ADVICE (OUTPATIENT)
Dept: PEDIATRICS | Facility: CLINIC | Age: 79
End: 2024-03-06
Payer: COMMERCIAL

## 2024-03-11 NOTE — TELEPHONE ENCOUNTER
Completed and placed in station or SB4 PAL outbasket.     FRAN Doherty MD  Internal Medicine-Pediatrics

## 2024-03-18 ENCOUNTER — MYC MEDICAL ADVICE (OUTPATIENT)
Dept: PEDIATRICS | Facility: CLINIC | Age: 79
End: 2024-03-18
Payer: COMMERCIAL

## 2024-03-19 ENCOUNTER — NURSE TRIAGE (OUTPATIENT)
Dept: PEDIATRICS | Facility: CLINIC | Age: 79
End: 2024-03-19

## 2024-03-19 ENCOUNTER — MYC MEDICAL ADVICE (OUTPATIENT)
Dept: PEDIATRICS | Facility: CLINIC | Age: 79
End: 2024-03-19

## 2024-03-19 ENCOUNTER — ANCILLARY PROCEDURE (OUTPATIENT)
Dept: GENERAL RADIOLOGY | Facility: CLINIC | Age: 79
End: 2024-03-19
Attending: PHYSICIAN ASSISTANT
Payer: COMMERCIAL

## 2024-03-19 ENCOUNTER — OFFICE VISIT (OUTPATIENT)
Dept: PEDIATRICS | Facility: CLINIC | Age: 79
End: 2024-03-19
Payer: COMMERCIAL

## 2024-03-19 VITALS
WEIGHT: 135.5 LBS | RESPIRATION RATE: 12 BRPM | TEMPERATURE: 97.9 F | BODY MASS INDEX: 25.92 KG/M2 | OXYGEN SATURATION: 98 % | HEART RATE: 69 BPM | DIASTOLIC BLOOD PRESSURE: 71 MMHG | SYSTOLIC BLOOD PRESSURE: 132 MMHG

## 2024-03-19 DIAGNOSIS — R05.1 ACUTE COUGH: Primary | ICD-10-CM

## 2024-03-19 DIAGNOSIS — R05.1 ACUTE COUGH: ICD-10-CM

## 2024-03-19 PROCEDURE — 71046 X-RAY EXAM CHEST 2 VIEWS: CPT | Mod: TC | Performed by: RADIOLOGY

## 2024-03-19 PROCEDURE — 99213 OFFICE O/P EST LOW 20 MIN: CPT | Performed by: PHYSICIAN ASSISTANT

## 2024-03-19 RX ORDER — RESPIRATORY SYNCYTIAL VIRUS VACCINE 120MCG/0.5
0.5 KIT INTRAMUSCULAR ONCE
Qty: 1 EACH | Refills: 0 | Status: CANCELLED | OUTPATIENT
Start: 2024-03-19 | End: 2024-03-19

## 2024-03-19 ASSESSMENT — PAIN SCALES - GENERAL: PAINLEVEL: NO PAIN (0)

## 2024-03-19 ASSESSMENT — ENCOUNTER SYMPTOMS: COUGH: 1

## 2024-03-19 NOTE — TELEPHONE ENCOUNTER
Attempted to reach pt via phone to further discuss symptoms. Left VM to call back.  message also sent.   Thomas Ibrahim RN on 3/19/2024 at 10:40 AM

## 2024-03-19 NOTE — PROGRESS NOTES
"  Assessment & Plan     Acute cough    - XR Chest 2 Views; Future      Patient was seen today for one week of a cough, but no other concerns or symptoms.  She denies any fevers or chills.  On exam today she is well appearing with normal lung sounds.  She was advised to practice supportive cares and followup if symptoms do not improve.  Chest XR is negative for any acute findings today.  Patient understands and agrees with the plan today.        BMI  Estimated body mass index is 25.92 kg/m  as calculated from the following:    Height as of 2/28/24: 1.54 m (5' 0.63\").    Weight as of this encounter: 61.5 kg (135 lb 8 oz).       Amado Delgado is a 78 year old, presenting for the following health issues:  Cough        3/19/2024     5:11 PM   Additional Questions   Roomed by MAGALY Haynes   Accompanied by n/a         3/19/2024     5:11 PM   Patient Reported Additional Medications   Patient reports taking the following new medications n/a     History of Present Illness       Reason for visit:  Persistent cough  Symptom onset:  3-7 days ago  Symptoms include:  Cough, thick throat, tired  Symptom intensity:  Moderate  Symptom progression:  Staying the same  Had these symptoms before:  Yes  Has tried/received treatment for these symptoms:  No  What makes it worse:  No  What makes it better:  Cough drops, warm water with lemon juice & honey, sleeping, drinking lots of water.    She eats 2-3 servings of fruits and vegetables daily.She consumes 0 sweetened beverage(s) daily.She exercises with enough effort to increase her heart rate 20 to 29 minutes per day.  She exercises with enough effort to increase her heart rate 3 or less days per week.   She is taking medications regularly.       She states that she started feeling tired last week Monday and then coughing by Tuesday.  She states that occasionally the cough is productive.  Usually it is not fully productive.  She is not having chest pains or shortness of breath.  " She is not having any fevers, chills or body aches.          Review of Systems  Constitutional, neuro, ENT, endocrine, pulmonary, cardiac, gastrointestinal, genitourinary, musculoskeletal, integument and psychiatric systems are negative, except as otherwise noted.      Objective    /71 (BP Location: Right arm, Patient Position: Sitting, Cuff Size: Adult Regular)   Pulse 69   Temp 97.9  F (36.6  C) (Tympanic)   Resp 12   Wt 61.5 kg (135 lb 8 oz)   SpO2 98%   BMI 25.92 kg/m    Body mass index is 25.92 kg/m .  Physical Exam   GENERAL: alert and no distress  EYES: Eyes grossly normal to inspection, PERRL and conjunctivae and sclerae normal  HENT: ear canals and TM's normal, nose and mouth without ulcers or lesions  NECK: no adenopathy, no asymmetry, masses, or scars  RESP: lungs clear to auscultation - no rales, rhonchi or wheezes  CV: regular rate and rhythm, normal S1 S2, no S3 or S4, no murmur, click or rub, no peripheral edema  MS: no gross musculoskeletal defects noted, no edema    CXR - Reviewed and interpreted by me No concern for acute process or pneumonia        Signed Electronically by: Zuly Bahena PA-C

## 2024-03-19 NOTE — TELEPHONE ENCOUNTER
Patient c/o fatigue 9 days ago. Dry cough onset 8 days ago. Cough is looser and more frequent.    Denies chest pain, SOB, fever, and sinus symptoms.    Tested neg for Covid x 3.    Patient would like to be seen.    Made appointment to be seen today.    Bell Bynum RN        Reason for Disposition   Patient wants to be seen    Additional Information   Negative: Bluish (or gray) lips or face   Negative: SEVERE difficulty breathing (e.g., struggling for each breath, speaks in single words)   Negative: Rapid onset of cough and has hives   Negative: Coughing started suddenly after medicine, an allergic food or bee sting   Negative: Difficulty breathing after exposure to flames, smoke, or fumes   Negative: Sounds like a life-threatening emergency to the triager   Negative: Previous asthma attacks and this feels like asthma attack   Negative: Dry cough (non-productive; no sputum or minimal clear sputum) and within 14 days of COVID-19 Exposure   Negative: MODERATE difficulty breathing (e.g., speaks in phrases, SOB even at rest, pulse 100-120) and still present when not coughing   Negative: Chest pain present when not coughing   Negative: Passed out (i.e., fainted, collapsed and was not responding)   Negative: Patient sounds very sick or weak to the triager   Negative: MILD difficulty breathing (e.g., minimal/no SOB at rest, SOB with walking, pulse <100) and still present when not coughing   Negative: Coughed up > 1 tablespoon (15 ml) blood (Exception: Blood-tinged sputum.)   Negative: Fever > 103 F (39.4 C)   Negative: Fever > 101 F (38.3 C) and over 60 years of age   Negative: Fever > 100.0 F (37.8 C) and has diabetes mellitus or a weak immune system (e.g., HIV positive, cancer chemotherapy, organ transplant, splenectomy, chronic steroids)   Negative: Fever > 100.0 F (37.8 C) and bedridden (e.g., CVA, chronic illness, recovering from surgery)   Negative: Increasing ankle swelling   Negative: Wheezing is present    "Negative: SEVERE coughing spells (e.g., whooping sound after coughing, vomiting after coughing)   Negative: Coughing up goran-colored (reddish-brown) or blood-tinged sputum   Negative: Fever present > 3 days (72 hours)   Negative: Fever returns after gone for over 24 hours and symptoms worse or not improved   Negative: Using nasal washes and pain medicine > 24 hours and sinus pain persists   Negative: Known COPD or other severe lung disease (i.e., bronchiectasis, cystic fibrosis, lung surgery) and worsening symptoms (i.e., increased sputum purulence or amount, increased breathing difficulty)    Answer Assessment - Initial Assessment Questions  1. ONSET: \"When did the cough begin?\"       8 days ago  2. SEVERITY: \"How bad is the cough today?\"       Episodes of coughing. Not worse  3. SPUTUM: \"Describe the color of your sputum\" (none, dry cough; clear, white, yellow, green)      Have not brought up sputum. Feels loose  4. HEMOPTYSIS: \"Are you coughing up any blood?\" If so ask: \"How much?\" (flecks, streaks, tablespoons, etc.)      No  5. DIFFICULTY BREATHING: \"Are you having difficulty breathing?\" If Yes, ask: \"How bad is it?\" (e.g., mild, moderate, severe)     - MILD: No SOB at rest, mild SOB with walking, speaks normally in sentences, can lie down, no retractions, pulse < 100.     - MODERATE: SOB at rest, SOB with minimal exertion and prefers to sit, cannot lie down flat, speaks in phrases, mild retractions, audible wheezing, pulse 100-120.     - SEVERE: Very SOB at rest, speaks in single words, struggling to breathe, sitting hunched forward, retractions, pulse > 120       None  6. FEVER: \"Do you have a fever?\" If Yes, ask: \"What is your temperature, how was it measured, and when did it start?\"      No  7. CARDIAC HISTORY: \"Do you have any history of heart disease?\" (e.g., heart attack, congestive heart failure)       Heart disease. stent  8. LUNG HISTORY: \"Do you have any history of lung disease?\"  (e.g., pulmonary " "embolus, asthma, emphysema)      No  9. PE RISK FACTORS: \"Do you have a history of blood clots?\" (or: recent major surgery, recent prolonged travel, bedridden)      No  10. OTHER SYMPTOMS: \"Do you have any other symptoms?\" (e.g., runny nose, wheezing, chest pain)        No  11. PREGNANCY: \"Is there any chance you are pregnant?\" \"When was your last menstrual period?\"        NA  12. TRAVEL: \"Have you traveled out of the country in the last month?\" (e.g., travel history, exposures)        No    Protocols used: Cough-A-OH    "

## 2024-03-20 NOTE — RESULT ENCOUNTER NOTE
Robert Delgado ,    The results from your recent chest XR appear normal, without sign of pneumonia.      It appears that some of the scaring noted on past XR is improved from the prior scans, which is good news.  There are no acute concerns or findings.        Thank you for choosing [facility] for your health care needs,      Zuly Bahena PA-C

## 2024-04-05 ENCOUNTER — MYC MEDICAL ADVICE (OUTPATIENT)
Dept: PEDIATRICS | Facility: CLINIC | Age: 79
End: 2024-04-05
Payer: COMMERCIAL

## 2024-04-05 NOTE — TELEPHONE ENCOUNTER
See patient's [employer] message   - Patient requesting a refill of her simvastatin (ZOCOR) 40 MG tablet and her ferrous sulfate (FE TABS) 325 (65 Fe) MG EC tablet medications to be sent to the   [facility] [id number] [place] [address]     simvastatin (ZOCOR) 40 MG tablet 90 tablet 3 2/28/2024 -- No   Sig: TAKE 1 TABLET BY MOUTH EVERYDAY AT BEDTIME     ferrous sulfate (FE TABS) 325 (65 Fe) MG EC tablet 45 tablet 1 2/28/2024 -- No   Sig - Route: Take 1 tablet (325 mg) by mouth every other day - Oral     Sent a [employer] message to the patient   - Informed the patient to contact the pharmacy as she should have refills on file at this time     Mihaela HINOJOSA RN   [facility]

## 2024-05-22 ENCOUNTER — TRANSFERRED RECORDS (OUTPATIENT)
Dept: HEALTH INFORMATION MANAGEMENT | Facility: CLINIC | Age: 79
End: 2024-05-22
Payer: COMMERCIAL

## 2024-05-28 ENCOUNTER — LAB (OUTPATIENT)
Dept: LAB | Facility: CLINIC | Age: 79
End: 2024-05-28
Payer: COMMERCIAL

## 2024-05-28 DIAGNOSIS — E55.9 VITAMIN D DEFICIENCY: ICD-10-CM

## 2024-05-28 DIAGNOSIS — E67.3 HYPERVITAMINOSIS D: Primary | ICD-10-CM

## 2024-05-28 LAB
HOLD SPECIMEN: NORMAL
VIT D+METAB SERPL-MCNC: 64 NG/ML (ref 20–50)

## 2024-05-28 PROCEDURE — 36415 COLL VENOUS BLD VENIPUNCTURE: CPT

## 2024-05-28 PROCEDURE — 82306 VITAMIN D 25 HYDROXY: CPT

## 2024-05-29 DIAGNOSIS — E67.3 HYPERVITAMINOSIS D: Primary | ICD-10-CM

## 2024-05-29 NOTE — PROGRESS NOTES
Vitamin D Deficiency Screening Results:  Lab Results   Component Value Date    VITDT 64 (H) 05/28/2024    VITDT 58 (H) 02/28/2024    VITDT 69 (H) 01/25/2024    VITDT 50 01/05/2023

## 2024-06-06 ENCOUNTER — TELEPHONE (OUTPATIENT)
Dept: EDUCATION SERVICES | Facility: CLINIC | Age: 79
End: 2024-06-06

## 2024-06-06 ENCOUNTER — LAB (OUTPATIENT)
Dept: LAB | Facility: CLINIC | Age: 79
End: 2024-06-06
Payer: COMMERCIAL

## 2024-06-06 ENCOUNTER — LAB (OUTPATIENT)
Dept: LAB | Facility: CLINIC | Age: 79
End: 2024-06-06
Attending: INTERNAL MEDICINE
Payer: COMMERCIAL

## 2024-06-06 DIAGNOSIS — E67.3 HYPERVITAMINOSIS D: ICD-10-CM

## 2024-06-06 DIAGNOSIS — E11.9 TYPE 2 DIABETES MELLITUS WITHOUT COMPLICATION, WITHOUT LONG-TERM CURRENT USE OF INSULIN (H): Primary | ICD-10-CM

## 2024-06-06 DIAGNOSIS — E11.9 DIABETES MELLITUS (H): Primary | ICD-10-CM

## 2024-06-06 DIAGNOSIS — E11.9 TYPE 2 DIABETES MELLITUS WITHOUT COMPLICATION, WITHOUT LONG-TERM CURRENT USE OF INSULIN (H): ICD-10-CM

## 2024-06-06 LAB — HBA1C MFR BLD: 5.9 %

## 2024-06-06 PROCEDURE — 36415 COLL VENOUS BLD VENIPUNCTURE: CPT

## 2024-06-06 PROCEDURE — 83036 HEMOGLOBIN GLYCOSYLATED A1C: CPT

## 2024-06-06 PROCEDURE — 83970 ASSAY OF PARATHORMONE: CPT

## 2024-06-06 NOTE — TELEPHONE ENCOUNTER
Lab Results   Component Value Date    A1C 6.4 2024    A1C 6.5 2024    A1C 6.6 10/27/2023    A1C 5.9 2013    A1C 6.3 2012     Patient presented to clinic to have A1c checked. Current future order is not to be checked until August. It has been >3 months since last A1c check, so order was placed so patient could have blood drawn today per her preference.     Cortney Hampton, JORI, RDN, LD, [facility]  Diabetes     [phone]  Diabetes Education Care Team: [phone]

## 2024-06-07 LAB — PTH-INTACT SERPL-MCNC: 28 PG/ML (ref 15–65)

## 2024-06-12 ENCOUNTER — ALLIED HEALTH/NURSE VISIT (OUTPATIENT)
Dept: EDUCATION SERVICES | Facility: CLINIC | Age: 79
End: 2024-06-12
Payer: COMMERCIAL

## 2024-06-12 VITALS — BODY MASS INDEX: 24.58 KG/M2 | WEIGHT: 128.5 LBS

## 2024-06-12 DIAGNOSIS — E11.9 TYPE 2 DIABETES MELLITUS WITHOUT COMPLICATION, WITHOUT LONG-TERM CURRENT USE OF INSULIN (H): Primary | ICD-10-CM

## 2024-06-12 PROCEDURE — 99207 PR MNT INDIVIDUAL F/U REASSESS, EA 15 MIN: CPT | Performed by: DIETITIAN, REGISTERED

## 2024-06-12 NOTE — PROGRESS NOTES
Diabetes Self-Management Education & Support    Presents for: Follow-up    Type of Service: In Person Visit      ASSESSMENT:  Patient presents to clinic today to assess/assist with current diabetes self management skills. Patient arrived today unaccompanied. Patient is not currently on any BG lowering medications and is not checking BG at home.     Intake was reviewed and patient is currently eating 3 meals and 1 snack per day. See diet history below. Patient notes she has been monitoring her portions more now and is happy with how things are going. Areas of opportunity include remaining consistent at adding protein with each carbohydrate source consumed.  Activity has improved since our last visit. Patient still has [employer] membership. Has bene working with a  2 days per week for 30 minutes on resistance training to help with balance. Was not using her walking stick at today's appointment. Patient also uses her watch to alert her when she has been inactive for an extended period and gets up to move when alerted. Notably, patient has lost 12 lbs over the last 4 months (8.6%). She is very pleased about this.     DIET HISTORY: wakes ~8AM, bed ~10:30-11PM  Breakfast: fruit + 1/2 bagel with PB // ~1 cup oatmeal // Ensure 1/3 beverage (added protein if meal is lacking) // 1/2 banana and apple sauce pouch // scrambled eggs with toast  Lunch: 1 cup soup (instead of whole container)  Dinner: smaller portion + more salad // splitting meals - last 3 days now instead of 2 // steak 4-5 oz + mashed potatoes + carrots // vegan corn dog // 1-2 slice pizza  Snack: cheese and crackers // frozen desserts on occasion // less DQ  Beverages: water, rare coffee now (decaf)    Prior to today's appointment we rechecked Danny's A1c which returned lower than previous.     Prior to todays appointment I reviewed their most recent office visit notes as well as lab results.    Patient's most recent   Lab Results   Component Value  "Date    A1C 5.9 06/06/2024    A1C 6.4 02/28/2024    A1C 6.5 01/25/2024    A1C 6.6 10/27/2023    A1C 5.9 08/01/2013   is meeting goal of  7.5%    Diabetes knowledge and skills assessment:   Patient is knowledgeable in diabetes management concepts related to: Healthy Eating and Being Active    Continue education with the following diabetes management concepts: Healthy Eating, Being Active, Problem Solving, Reducing Risks, and Healthy Coping    Based on learning assessment above, most appropriate setting for further diabetes education would be: Group class or Individual setting.      PLAN  Continue the excellent work with monitoring carbohydrate intake, consuming balanced meals and snacks, and increasing physical activity.   Recheck A1c every 3-6 months.     Topics to cover at upcoming visits: Healthy Eating, Being Active, Problem Solving, Reducing Risks, and Healthy Coping    Follow-up: 6-12 months - patient to schedule    See Care Plan for co-developed, patient-state behavior change goals.  AVS provided for patient today.    Education Materials Provided:  No new materials provided today      SUBJECTIVE/OBJECTIVE:  Presents for: Follow-up  Accompanied by: Self  Diabetes education in the past 24mo: Yes  Focus of Visit: Healthy Eating, Problem Solving, Being Active  Diabetes type: Type 2  Date of diagnosis: 2023  Disease course: Improving  Transportation concerns: No  Other concerns:: None  Cultural Influences/Ethnic Background:  Not  or     Diabetes Symptoms & Complications:  Weight trend: Decreasing  Disease course: Improving       Patient Problem List and Family Medical History reviewed for relevant medical history, current medical status, and diabetes risk factors.    Vitals:  Wt 58.3 kg (128 lb 8 oz)   BMI 24.58 kg/m    Estimated body mass index is 24.58 kg/m  as calculated from the following:    Height as of 2/28/24: 1.54 m (5' 0.63\").    Weight as of this encounter: 58.3 kg (128 lb 8 oz).   Last 3 " "BP:   BP Readings from Last 3 Encounters:   03/19/24 132/71   02/28/24 120/56   12/15/23 128/60       History   Smoking Status    Never   Smokeless Tobacco    Never       Labs:  Lab Results   Component Value Date    A1C 5.9 06/06/2024     Lab Results   Component Value Date     01/25/2024    GLC 97 06/16/2023     Lab Results   Component Value Date    LDL 55 01/25/2024     Direct Measure HDL   Date Value Ref Range Status   01/25/2024 63 >=50 mg/dL Final     GFR Estimate   Date Value Ref Range Status   01/25/2024 66 >60 mL/min/1.73m2 Final   10/08/2020 >60 >60 mL/min/1.73m2 Final     GFR Estimate If Black   Date Value Ref Range Status   10/08/2020 >60 >60 mL/min/1.73m2 Final     Lab Results   Component Value Date    CR 0.89 01/25/2024     No results found for: \"MICROALBUMIN\"    Healthy Eating:  Healthy Eating Assessed Today: Yes  Cultural/Lutheran diet restrictions?: No  Do you have any food allergies or intolerances?: No  Meal planning/habits: Low salt, Carb counting, Smaller portions, Avoiding sweets  Who cooks/prepares meals for you?: Self  Who purchases food in  your home?: Self  Meals include: Breakfast, Lunch, Dinner, Evening Snack  Beverages: Water  Has patient met with a dietitian in the past?: No    Being Active:  Being Active Assessed Today: Yes  Exercise:: Yes  Days per week of moderate to strenuous exercise (like a brisk walk): 2  On average, minutes per day of exercise at this level: 60  How intense was your typical exercise? : Moderate (like brisk walking)  Exercise Minutes per Week: 120  Barrier to exercise: None    Monitoring:  Monitoring Assessed Today: Yes  Times checking blood sugar at home (number): Never    Taking Medications:  Taking Medication Assessed Today: Yes  Current Treatments: Diet    Problem Solving:  Problem Solving Assessed Today: Yes  Is the patient at risk for hypoglycemia?: No  Is the patient at risk for DKA?: No  Does patient have severe weather/disaster plan for diabetes " management?: Not Needed  Does patient have sick day plan for diabetes management?: Not Needed    Reducing Risks:  Reducing Risks Assessed Today: No  Has dilated eye exam at least once a year?: Yes    Healthy Coping:  Healthy Coping Assessed Today: Yes  Emotional response to diabetes: Ready to learn  Informal Support system:: Family, Friends  Stage of change: ACTION (Actively working towards change)    Care Plan and Education Provided:  Healthy Eating: Balanced meals, Consistency in amount and timing of carbohydrate intake, and Portion control, Being Active: Finding a physical activity routine that works for you and Relationship of activity to glucose, Problem Solving: Low glucose - causes, signs/symptoms, treatment and prevention and Rule of 15 and carrying a carbohydrate source at all times in case of low glucose, Reducing Risks: Goal for A1c, how it relates to glucose and how often to check, and Healthy Coping: Benefits of making appropriate lifestyle changes    Cortney Hampton, JORI, RDN, LD, [facility]  Diabetes     [phone]  Diabetes Education Care Team: [phone]    Time Spent: 22 minutes  Encounter Type: Individual    Any diabetes medication dose changes were made via the CDE Protocol per the patient's referring provider. A copy of this encounter was shared with the provider.

## 2024-06-12 NOTE — LETTER
6/12/2024         RE: Rios Michael  [address] [address]        Dear Colleague,    Thank you for referring your patient, Rios Michael, to the [facility]. Please see a copy of my visit note below.    Diabetes Self-Management Education & Support    Presents for: Follow-up    Type of Service: In Person Visit      ASSESSMENT:  Patient presents to clinic today to assess/assist with current diabetes self management skills. Patient arrived today unaccompanied. Patient is not currently on any BG lowering medications and is not checking BG at home.     Intake was reviewed and patient is currently eating 3 meals and 1 snack per day. See diet history below. Patient notes she has been monitoring her portions more now and is happy with how things are going. Areas of opportunity include remaining consistent at adding protein with each carbohydrate source consumed.  Activity has improved since our last visit. Patient still has [employer] membership. Has bene working with a  2 days per week for 30 minutes on resistance training to help with balance. Was not using her walking stick at today's appointment. Patient also uses her watch to alert her when she has been inactive for an extended period and gets up to move when alerted. Notably, patient has lost 12 lbs over the last 4 months (8.6%). She is very pleased about this.     DIET HISTORY: wakes ~8AM, bed ~10:30-11PM  Breakfast: fruit + 1/2 bagel with PB // ~1 cup oatmeal // Ensure 1/3 beverage (added protein if meal is lacking) // 1/2 banana and apple sauce pouch // scrambled eggs with toast  Lunch: 1 cup soup (instead of whole container)  Dinner: smaller portion + more salad // splitting meals - last 3 days now instead of 2 // steak 4-5 oz + mashed potatoes + carrots // vegan corn dog // 1-2 slice pizza  Snack: cheese and crackers // frozen desserts on occasion // less DQ  Beverages: water, rare coffee now  (decaf)    Prior to today's appointment we rechecked Danny's A1c which returned lower than previous.     Prior to todays appointment I reviewed their most recent office visit notes as well as lab results.    Patient's most recent   Lab Results   Component Value Date    A1C 5.9 06/06/2024    A1C 6.4 02/28/2024    A1C 6.5 01/25/2024    A1C 6.6 10/27/2023    A1C 5.9 08/01/2013   is meeting goal of  7.5%    Diabetes knowledge and skills assessment:   Patient is knowledgeable in diabetes management concepts related to: Healthy Eating and Being Active    Continue education with the following diabetes management concepts: Healthy Eating, Being Active, Problem Solving, Reducing Risks, and Healthy Coping    Based on learning assessment above, most appropriate setting for further diabetes education would be: Group class or Individual setting.      PLAN  Continue the excellent work with monitoring carbohydrate intake, consuming balanced meals and snacks, and increasing physical activity.   Recheck A1c every 3-6 months.     Topics to cover at upcoming visits: Healthy Eating, Being Active, Problem Solving, Reducing Risks, and Healthy Coping    Follow-up: 6-12 months - patient to schedule    See Care Plan for co-developed, patient-state behavior change goals.  AVS provided for patient today.    Education Materials Provided:  No new materials provided today      SUBJECTIVE/OBJECTIVE:  Presents for: Follow-up  Accompanied by: Self  Diabetes education in the past 24mo: Yes  Focus of Visit: Healthy Eating, Problem Solving, Being Active  Diabetes type: Type 2  Date of diagnosis: 2023  Disease course: Improving  Transportation concerns: No  Other concerns:: None  Cultural Influences/Ethnic Background:  Not  or     Diabetes Symptoms & Complications:  Weight trend: Decreasing  Disease course: Improving       Patient Problem List and Family Medical History reviewed for relevant medical history, current medical status, and  "diabetes risk factors.    Vitals:  Wt 58.3 kg (128 lb 8 oz)   BMI 24.58 kg/m    Estimated body mass index is 24.58 kg/m  as calculated from the following:    Height as of 2/28/24: 1.54 m (5' 0.63\").    Weight as of this encounter: 58.3 kg (128 lb 8 oz).   Last 3 BP:   BP Readings from Last 3 Encounters:   03/19/24 132/71   02/28/24 120/56   12/15/23 128/60       History   Smoking Status     Never   Smokeless Tobacco     Never       Labs:  Lab Results   Component Value Date    A1C 5.9 06/06/2024     Lab Results   Component Value Date     01/25/2024    GLC 97 06/16/2023     Lab Results   Component Value Date    LDL 55 01/25/2024     Direct Measure HDL   Date Value Ref Range Status   01/25/2024 63 >=50 mg/dL Final     GFR Estimate   Date Value Ref Range Status   01/25/2024 66 >60 mL/min/1.73m2 Final   10/08/2020 >60 >60 mL/min/1.73m2 Final     GFR Estimate If Black   Date Value Ref Range Status   10/08/2020 >60 >60 mL/min/1.73m2 Final     Lab Results   Component Value Date    CR 0.89 01/25/2024     No results found for: \"MICROALBUMIN\"    Healthy Eating:  Healthy Eating Assessed Today: Yes  Cultural/Quaker diet restrictions?: No  Do you have any food allergies or intolerances?: No  Meal planning/habits: Low salt, Carb counting, Smaller portions, Avoiding sweets  Who cooks/prepares meals for you?: Self  Who purchases food in  your home?: Self  Meals include: Breakfast, Lunch, Dinner, Evening Snack  Beverages: Water  Has patient met with a dietitian in the past?: No    Being Active:  Being Active Assessed Today: Yes  Exercise:: Yes  Days per week of moderate to strenuous exercise (like a brisk walk): 2  On average, minutes per day of exercise at this level: 60  How intense was your typical exercise? : Moderate (like brisk walking)  Exercise Minutes per Week: 120  Barrier to exercise: None    Monitoring:  Monitoring Assessed Today: Yes  Times checking blood sugar at home (number): Never    Taking " Medications:  Taking Medication Assessed Today: Yes  Current Treatments: Diet    Problem Solving:  Problem Solving Assessed Today: Yes  Is the patient at risk for hypoglycemia?: No  Is the patient at risk for DKA?: No  Does patient have severe weather/disaster plan for diabetes management?: Not Needed  Does patient have sick day plan for diabetes management?: Not Needed    Reducing Risks:  Reducing Risks Assessed Today: No  Has dilated eye exam at least once a year?: Yes    Healthy Coping:  Healthy Coping Assessed Today: Yes  Emotional response to diabetes: Ready to learn  Informal Support system:: Family, Friends  Stage of change: ACTION (Actively working towards change)    Care Plan and Education Provided:  Healthy Eating: Balanced meals, Consistency in amount and timing of carbohydrate intake, and Portion control, Being Active: Finding a physical activity routine that works for you and Relationship of activity to glucose, Problem Solving: Low glucose - causes, signs/symptoms, treatment and prevention and Rule of 15 and carrying a carbohydrate source at all times in case of low glucose, Reducing Risks: Goal for A1c, how it relates to glucose and how often to check, and Healthy Coping: Benefits of making appropriate lifestyle changes    Cortney Hampton, JORI, RDN, LD, [facility]  Diabetes     [phone]  Diabetes Education Care Team: [phone]    Time Spent: 22 minutes  Encounter Type: Individual    Any diabetes medication dose changes were made via the CDE Protocol per the patient's referring provider. A copy of this encounter was shared with the provider.

## 2024-07-10 ENCOUNTER — TRANSFERRED RECORDS (OUTPATIENT)
Dept: HEALTH INFORMATION MANAGEMENT | Facility: CLINIC | Age: 79
End: 2024-07-10
Payer: COMMERCIAL

## 2024-08-26 ENCOUNTER — LAB (OUTPATIENT)
Dept: LAB | Facility: CLINIC | Age: 79
End: 2024-08-26
Payer: COMMERCIAL

## 2024-08-26 DIAGNOSIS — E11.9 TYPE 2 DIABETES MELLITUS WITHOUT COMPLICATION, WITHOUT LONG-TERM CURRENT USE OF INSULIN (H): ICD-10-CM

## 2024-08-26 DIAGNOSIS — E67.3 HYPERVITAMINOSIS D: ICD-10-CM

## 2024-08-26 LAB
HBA1C MFR BLD: 6.2 % (ref 0–5.6)
VIT D+METAB SERPL-MCNC: 56 NG/ML (ref 20–50)

## 2024-08-26 PROCEDURE — 36415 COLL VENOUS BLD VENIPUNCTURE: CPT

## 2024-08-26 PROCEDURE — 82306 VITAMIN D 25 HYDROXY: CPT

## 2024-08-26 PROCEDURE — 83036 HEMOGLOBIN GLYCOSYLATED A1C: CPT

## 2024-09-09 ENCOUNTER — VIRTUAL VISIT (OUTPATIENT)
Dept: PEDIATRICS | Facility: CLINIC | Age: 79
End: 2024-09-09
Payer: COMMERCIAL

## 2024-09-09 DIAGNOSIS — K25.4 CHRONIC GASTRIC ULCER WITH HEMORRHAGE: ICD-10-CM

## 2024-09-09 DIAGNOSIS — E11.9 TYPE 2 DIABETES MELLITUS WITHOUT COMPLICATION, WITHOUT LONG-TERM CURRENT USE OF INSULIN (H): Primary | ICD-10-CM

## 2024-09-09 DIAGNOSIS — E78.2 MIXED HYPERLIPIDEMIA: ICD-10-CM

## 2024-09-09 DIAGNOSIS — E55.9 VITAMIN D DEFICIENCY: ICD-10-CM

## 2024-09-09 DIAGNOSIS — E03.9 ACQUIRED HYPOTHYROIDISM: ICD-10-CM

## 2024-09-09 PROCEDURE — 99213 OFFICE O/P EST LOW 20 MIN: CPT | Mod: 95 | Performed by: INTERNAL MEDICINE

## 2024-09-09 PROCEDURE — G2211 COMPLEX E/M VISIT ADD ON: HCPCS | Mod: 95 | Performed by: INTERNAL MEDICINE

## 2024-09-09 NOTE — PATIENT INSTRUCTIONS
Calcium 1200 mg daily total (supplement + food)    I would stop the Caltrate and if needed take a calcium only supplement of about 600 mg daily.   You can keep the multivitamin.     Diabetes - keep up the great work here with nutrition and exercise    I'll see you in March with fasting labs prior.

## 2024-09-09 NOTE — PROGRESS NOTES
"Danny is a 78 year old who is being evaluated via a billable video visit.          Assessment & Plan       ICD-10-CM    1. Type 2 diabetes mellitus without complication, without long-term current use of insulin (H)  E11.9 Hemoglobin A1c     Lipid panel reflex to direct LDL Fasting     Comprehensive metabolic panel     Albumin Random Urine Quantitative with Creat Ratio      2. Mixed hyperlipidemia  E78.2 Lipid panel reflex to direct LDL Fasting     Comprehensive metabolic panel      3. Acquired hypothyroidism  E03.9 TSH with free T4 reflex      4. Vitamin D deficiency  E55.9 Vitamin D Deficiency      5. Chronic gastric ulcer with hemorrhage  K25.4 Ferritin     CBC with platelets        --------------------------  PATIENT INSTRUCTIONS    Patient Instructions   Calcium 1200 mg daily total (supplement + food)    I would stop the Caltrate and if needed take a calcium only supplement of about 600 mg daily.   You can keep the multivitamin.     Diabetes - keep up the great work here with nutrition and exercise    I'll see you in March with fasting labs prior.     --------------------------      BMI  Estimated body mass index is 24.58 kg/m  as calculated from the following:    Height as of 2/28/24: 1.54 m (5' 0.63\").    Weight as of 6/12/24: 58.3 kg (128 lb 8 oz).       Amado Delgado is a 78 year old, presenting for the following health issues:  No chief complaint on file.      Video Start Time: 4:41 PM    History of Present Illness       Diabetes:   She presents for follow up of diabetes.    She is not checking blood glucose.         She has no concerns regarding her diabetes at this time.  She is having redness, sores, or blisters on feet and blurry vision.            Reason for visit:  Follow-up on diabetes numbers, iron, high Vitamin D    She eats 2-3 servings of fruits and vegetables daily.She consumes 0 sweetened beverage(s) daily.She exercises with enough effort to increase her heart rate 20 to 29 minutes per " day.  She exercises with enough effort to increase her heart rate 3 or less days per week.   She is taking medications regularly.       # Social   - whole family went to [place] this summer - [place] ... 10 days  - Trying to get strength back - has a  - fell 1 year ago.     # T2DM  Lab Results   Component Value Date    A1C 6.2 08/26/2024    A1C 5.9 06/06/2024    A1C 6.4 02/28/2024    A1C 6.5 01/25/2024    A1C 6.6 10/27/2023   - knew her A1c would be up a bit    # Weight  Wt Readings from Last 4 Encounters:   06/12/24 58.3 kg (128 lb 8 oz)   03/19/24 61.5 kg (135 lb 8 oz)   02/28/24 63.7 kg (140 lb 8 oz)   12/15/23 62.3 kg (137 lb 6.4 oz)   - intentional weight loss    # Viatmin d def  - Caltrate has it 600 Ca/600 vitamin D  - multivitamin has 300 a day  - was on a vitamin d supplement     # Osteoporosis  - didn't want to take fosamax  - ? Repeat in 2025 and then decide        Objective           Vitals:  No vitals were obtained today due to virtual visit.    Physical Exam   GENERAL: alert and no distress  EYES: Eyes grossly normal to inspection.  No discharge or erythema, or obvious scleral/conjunctival abnormalities.  RESP: No audible wheeze, cough, or visible cyanosis.    SKIN: Visible skin clear. No significant rash, abnormal pigmentation or lesions.  NEURO: Cranial nerves grossly intact.  Mentation and speech appropriate for age.  PSYCH: Appropriate affect, tone, and pace of words          Video-Visit Details    Type of service:  Video Visit   Video End Time:4:58 PM  Originating Location (pt. Location): Home    Distant Location (provider location):  On-site  Platform used for Video Visit: Deya  Signed Electronically by: Feng Doherty MD      The longitudinal plan of care for the diagnosis(es)/condition(s) as documented were addressed during this visit. Due to the added complexity in care, I will continue to support Danny in the subsequent management and with ongoing  continuity of care.

## 2024-09-27 DIAGNOSIS — K92.2 UPPER GI BLEEDING: ICD-10-CM

## 2024-09-30 RX ORDER — FERROUS SULFATE 325(65) MG
325 TABLET, DELAYED RELEASE (ENTERIC COATED) ORAL EVERY OTHER DAY
Qty: 45 TABLET | Refills: 1 | Status: SHIPPED | OUTPATIENT
Start: 2024-09-30

## 2024-10-28 ENCOUNTER — MYC MEDICAL ADVICE (OUTPATIENT)
Dept: PEDIATRICS | Facility: CLINIC | Age: 79
End: 2024-10-28
Payer: COMMERCIAL

## 2024-10-28 DIAGNOSIS — Z86.19 HISTORY OF COLD SORES: ICD-10-CM

## 2024-10-29 RX ORDER — VALACYCLOVIR HYDROCHLORIDE 500 MG/1
500 TABLET, FILM COATED ORAL 2 TIMES DAILY
Qty: 24 TABLET | Refills: 2 | Status: SHIPPED | OUTPATIENT
Start: 2024-10-29

## 2024-12-07 ENCOUNTER — HEALTH MAINTENANCE LETTER (OUTPATIENT)
Age: 79
End: 2024-12-07

## 2025-02-26 DIAGNOSIS — E03.9 ACQUIRED HYPOTHYROIDISM: ICD-10-CM

## 2025-02-27 RX ORDER — LEVOTHYROXINE SODIUM 75 UG/1
75 TABLET ORAL DAILY
Qty: 90 TABLET | Refills: 0 | Status: SHIPPED | OUTPATIENT
Start: 2025-02-27

## 2025-02-28 ENCOUNTER — HOSPITAL ENCOUNTER (EMERGENCY)
Facility: CLINIC | Age: 80
Discharge: HOME | End: 2025-02-28
Attending: EMERGENCY MEDICINE | Admitting: EMERGENCY MEDICINE
Payer: COMMERCIAL

## 2025-02-28 ENCOUNTER — APPOINTMENT (OUTPATIENT)
Dept: MRI IMAGING | Facility: CLINIC | Age: 80
End: 2025-02-28
Attending: EMERGENCY MEDICINE
Payer: COMMERCIAL

## 2025-02-28 VITALS
BODY MASS INDEX: 24.17 KG/M2 | SYSTOLIC BLOOD PRESSURE: 150 MMHG | HEIGHT: 61 IN | RESPIRATION RATE: 22 BRPM | OXYGEN SATURATION: 97 % | WEIGHT: 128 LBS | TEMPERATURE: 97.6 F | HEART RATE: 70 BPM | DIASTOLIC BLOOD PRESSURE: 67 MMHG

## 2025-02-28 DIAGNOSIS — R42 VERTIGO: ICD-10-CM

## 2025-02-28 PROCEDURE — 250N000013 HC RX MED GY IP 250 OP 250 PS 637: Performed by: EMERGENCY MEDICINE

## 2025-02-28 PROCEDURE — 70551 MRI BRAIN STEM W/O DYE: CPT

## 2025-02-28 PROCEDURE — 99284 EMERGENCY DEPT VISIT MOD MDM: CPT | Mod: 25

## 2025-02-28 RX ORDER — MECLIZINE HYDROCHLORIDE 25 MG/1
25 TABLET ORAL ONCE
Status: COMPLETED | OUTPATIENT
Start: 2025-02-28 | End: 2025-02-28

## 2025-02-28 RX ORDER — MECLIZINE HYDROCHLORIDE 25 MG/1
25 TABLET ORAL 3 TIMES DAILY PRN
Qty: 30 TABLET | Refills: 0 | Status: SHIPPED | OUTPATIENT
Start: 2025-02-28

## 2025-02-28 RX ORDER — LORAZEPAM 0.5 MG/1
0.5 TABLET ORAL ONCE
Status: COMPLETED | OUTPATIENT
Start: 2025-02-28 | End: 2025-02-28

## 2025-02-28 RX ADMIN — MECLIZINE 25 MG: 25 TABLET ORAL at 07:18

## 2025-02-28 RX ADMIN — LORAZEPAM 0.5 MG: 0.5 TABLET ORAL at 08:17

## 2025-02-28 ASSESSMENT — ACTIVITIES OF DAILY LIVING (ADL)
ADLS_ACUITY_SCORE: 59

## 2025-02-28 ASSESSMENT — COLUMBIA-SUICIDE SEVERITY RATING SCALE - C-SSRS
1. IN THE PAST MONTH, HAVE YOU WISHED YOU WERE DEAD OR WISHED YOU COULD GO TO SLEEP AND NOT WAKE UP?: NO
6. HAVE YOU EVER DONE ANYTHING, STARTED TO DO ANYTHING, OR PREPARED TO DO ANYTHING TO END YOUR LIFE?: NO
2. HAVE YOU ACTUALLY HAD ANY THOUGHTS OF KILLING YOURSELF IN THE PAST MONTH?: NO

## 2025-02-28 NOTE — ED TRIAGE NOTES
Patient presents to the ED complaining of dizziness and spinning.  She states when she moves her head from side to side and stands up she feels like she is spinning.  Denies weakness in any extremities, changes in vision, headache, nausea, and vomiting.  No medications prior to arrival.     Triage Assessment (Adult)       Row Name 02/28/25 0630          Triage Assessment    Airway WDL WDL        Respiratory WDL    Respiratory WDL WDL        Skin Circulation/Temperature WDL    Skin Circulation/Temperature WDL WDL        Cardiac WDL    Cardiac WDL WDL        Peripheral/Neurovascular WDL    Peripheral Neurovascular WDL WDL        Cognitive/Neuro/Behavioral WDL    Cognitive/Neuro/Behavioral WDL WDL

## 2025-02-28 NOTE — ED NOTES
Pt trial of ambulation and stated she was still slightly dizzy but she felt like she could manage going home.  Able to ambulate well on her own.

## 2025-02-28 NOTE — ED PROVIDER NOTES
EMERGENCY DEPARTMENT ENCOUNTER      NAME: Rios Michael  AGE: 79 year old female  YOB: 1945  MRN: [id number]  EVALUATION DATE & TIME: 2025  6:34 AM    PCP: Feng Doherty    ED PROVIDER: Ricardo Vasques D.O.      Chief Complaint   Patient presents with    Dizziness       FINAL IMPRESSION:  1. Vertigo        ED COURSE & MEDICAL DECISION MAKIN:01 AM I met with the patient to gather history and to perform my initial exam. I discussed the plan for care while in the Emergency Department.  9:48 AM I met with the patient and updated them on plan of care.         Pertinent Labs & Imaging studies reviewed. (See chart for details)  79 year old female presents to the Emergency Department for evaluation of room spinning dizziness.  Patient reports that it does go away when she is sitting still.  Initial differential does include peripheral vertigo versus CVA versus other acute process.  There is no near syncope, chest pain, or shortness of breath to suggest ACS or PE.  Neuroexam this patient was largely unremarkable.  However based on age and severity of symptoms I did decide it would be prudent to perform an MRI, which fortunately did not show any evidence of stroke, and the meningioma that was seen the patient was already aware of.  Therefore it appears that the symptoms are most consistent with peripheral vertigo.  She did have significant improvement with meclizine in the emergency department and will discharge on the same as well as with instructions for Epley maneuvers.  Patient was comfortable with plan for discharge and outpatient follow-up.  Return precautions were discussed.    Medical Decision Making  Obtained supplemental history:Supplemental history obtained?: No  Reviewed external records: External records reviewed?: No  Care impacted by chronic illness:Documented in Chart  Did you consider but not order tests?: Work up considered but not performed and documented in chart, if  applicable  Did you interpret images independently?: Independent interpretation of ECG and images noted in documentation, when applicable.  Consultation discussion with other provider:Did you involve another provider (consultant, , pharmacy, etc.)?: No  Discharge. I prescribed additional prescription strength medication(s) as charted. I considered admission, but discharged patient after significant clinical improvement.    MIPS (CTPE, Dental pain, Salvador, Sinusitis, Asthma/COPD, Head Trauma): Not Applicable        At the conclusion of the encounter I discussed the results of all of the tests and the disposition. The questions were answered. The patient or family acknowledged understanding and was agreeable with the care plan.        HPI    Patient information was obtained from: Patient    Use of : N/A        Rios Michael is a 79 year old female who presents with dizziness.    Patient states she was driving when she felt some shifting and dizziness. Patient went home and began to feel better. Patient then woke up this morning and felt the same dizziness.  She does report that it resolves when she is sitting still, and comes on with sudden movement.  She denies any previous episodes of similar symptoms.  Patient states she has a cardiac stent.    Patient does not endorse chest pain, shortness of breath, numbness, tingling, nausea, vomiting, diarrhea, or any other concerns at this time.      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       PAST SURGICAL HISTORY:  Past Surgical History:   Procedure  Laterality Date    BREAST CYST EXCISION  [id number]    Removed from [facility]    BREAST SURGERY  6/1969    A cyst    CARDIAC CATHETERIZATION      COLONOSCOPY  7/2021    Need to return 7/2023comeback in 2 years    CORONARY STENT PLACEMENT      ESOPHAGOSCOPY, GASTROSCOPY, DUODENOSCOPY (EGD), COMBINED N/A 06/16/2023    Procedure: ESOPHAGOGASTRODUODENOSCOPY with biopsies;  Surgeon: Kana Hollins MD;  Location: [facility]    EYE SURGERY  6/21 7/21    Cataracts    ID PUNC/ASPIR BREAST CYST      Description: Breast Surgery Puncture Aspiration Of Cyst;  Recorded: 07/29/2008;    Z TOTAL HIP ARTHROPLASTY Right 11/12/2019    Procedure: RIGHT DIRECT ANTERIOR TOTAL HIP ARTHROPLASTY;  Surgeon: Collin Perez MD;  Location: [facility];  Service: Orthopedics    [facility] CORONARY STENT PERCUT, INITIAL VESSEL      Description: Cath Stent Placement;  Recorded: 06/11/2012;  Comments: ADRIANA to mid RCA         CURRENT MEDICATIONS:    No current facility-administered medications for this encounter.     Current Outpatient Medications   Medication Sig Dispense Refill    meclizine (ANTIVERT) 25 MG tablet Take 1 tablet (25 mg) by mouth 3 times daily as needed for dizziness or nausea. 30 tablet 0    aspirin 81 MG EC tablet [ASPIRIN 81 MG EC TABLET] Take 1 tablet (81 mg total) by mouth daily.  0    ferrous sulfate (FE TABS) 325 (65 Fe) MG EC tablet Take 1 tablet (325 mg) by mouth every other day. 45 tablet 1    levothyroxine (SYNTHROID/LEVOTHROID) 75 MCG tablet Take 1 tablet (75 mcg) by mouth daily. 90 tablet 0    multivitamin therapeutic (THERAGRAN) tablet [MULTIVITAMIN THERAPEUTIC (THERAGRAN) TABLET] Take 1 tablet by mouth daily. 90 tablet 1    sertraline (ZOLOFT) 25 MG tablet [SERTRALINE (ZOLOFT) 25 MG TABLET] TAKE 3 TABLETS BY MOUTH EVERY  tablet 3    simvastatin (ZOCOR) 40 MG tablet TAKE 1 TABLET BY MOUTH EVERYDAY AT BEDTIME 90 tablet 3    valACYclovir (VALTREX) 500 MG tablet Take 1 tablet (500 mg) by mouth 2  times daily. 24 tablet 2         ALLERGIES:  Allergies   Allergen Reactions    Estradiol Hives    Evista [Raloxifene] Hives       FAMILY HISTORY:  Family History   Problem Relation Age of Onset    Osteoporosis Mother     Dementia Mother     Anxiety Disorder Mother         Dementia    Coronary Artery Disease Father         in 80s    Alzheimer Disease Father     Hypertension Father     Hyperlipidemia Father     Clotting Disorder Brother         PE at age 64    Diabetes Brother     Coronary Artery Disease Brother     Hyperlipidemia Brother     Anxiety Disorder Brother     Other - See Comments Brother         Dementia? Heart issues    Coronary Artery Disease Brother         bypass    Diabetes Type 2  Brother     Pulmonary Embolism Son     Coronary Artery Disease Maternal Grandmother     Coronary Artery Disease Brother        SOCIAL HISTORY:  Social History     Socioeconomic History    Marital status: Single    Number of children: 4   Tobacco Use    Smoking status: Never     Passive exposure: Never    Smokeless tobacco: Never    Tobacco comments:     None   Vaping Use    Vaping status: Never Used   Substance and Sexual Activity    Alcohol use: Yes     Alcohol/week: 5.0 standard drinks of alcohol    Drug use: No    Sexual activity: Never   Other Topics Concern    Parent/sibling w/ CABG, MI or angioplasty before 65F 55M? No   Social History Narrative    12/2002    She has 4 children, one in [place], others in the [place].  She has 8 grandchildren (ages 14 to 11)        .         Bible study, high school friends have reconnected.     Grew up in [place].      Social Drivers of Health     Financial Resource Strain: Low Risk  (2/22/2024)    Financial Resource Strain     Within the past 12 months, have you or your family members you live with been unable to get utilities (heat, electricity) when it was really needed?: No   Food Insecurity: Low Risk  (2/22/2024)    Food Insecurity     Within the past 12  "months, did you worry that your food would run out before you got money to buy more?: No     Within the past 12 months, did the food you bought just not last and you didn t have money to get more?: No   Transportation Needs: Low Risk  (2/22/2024)    Transportation Needs     Within the past 12 months, has lack of transportation kept you from medical appointments, getting your medicines, non-medical meetings or appointments, work, or from getting things that you need?: No   Physical Activity: Insufficiently Active (2/22/2024)    Exercise Vital Sign     Days of Exercise per Week: 2 days     Minutes of Exercise per Session: 30 min   Stress: No Stress Concern Present (2/22/2024)    [employer] - Occupational Stress Questionnaire     Feeling of Stress : Only a little   Social Connections: Unknown (2/22/2024)    Social Connection and Isolation Panel [NHANES]     Frequency of Social Gatherings with Friends and Family: Three times a week   Interpersonal Safety: Low Risk  (2/28/2024)    Interpersonal Safety     Do you feel physically and emotionally safe where you currently live?: Yes     Within the past 12 months, have you been hit, slapped, kicked or otherwise physically hurt by someone?: No     Within the past 12 months, have you been humiliated or emotionally abused in other ways by your partner or ex-partner?: No   Housing Stability: Low Risk  (2/22/2024)    Housing Stability     Do you have housing? : Yes     Are you worried about losing your housing?: No       VITALS:  Patient Vitals for the past 24 hrs:   BP Temp Temp src Pulse Resp SpO2 Height Weight   02/28/25 0951 (!) 150/67 -- -- -- -- -- -- --   02/28/25 0950 -- -- -- -- -- 97 % -- --   02/28/25 0629 (!) 148/68 97.6  F (36.4  C) Temporal 70 22 97 % 1.549 m (5' 1\") 58.1 kg (128 lb)       PHYSICAL EXAM    VITAL SIGNS: BP (!) 150/67   Pulse 70   Temp 97.6  F (36.4  C) (Temporal)   Resp 22   Ht 1.549 m (5' 1\")   Wt 58.1 kg (128 lb)  "  SpO2 97%   BMI 24.19 kg/m      General Appearance: Well-appearing, well-nourished, no acute distress   Head:  Normocephalic, without obvious abnormality, atraumatic  Eyes:  PERRL, conjunctiva/corneas clear, EOM's intact,  ENT:  Lips, mucosa, and tongue normal, membranes are moist without pallor  Neck:  Normal ROM, symmetrical, trachea midline    Cardio:  Regular rate and rhythm, no murmur, rub or gallop, 2+ pulses symmetric in all extremities  Pulm:  Clear to auscultation bilaterally, respirations unlabored,  Abdomen:  Soft, non-tender, no rebound or guarding.  Musculoskeletal: Full ROM, no edema, no cyanosis, good ROM of major joints  Integument:  Warm, Dry, No erythema, No rash.    Neurologic: Patient is alert and oriented ×3.  Face is symmetric.  Speech is normal.  Visual fields are full.  Cranial nerves II through XII are grossly intact. Motor tone is 5/5 and equal in both upper and lower extremities.  Bilateral symmetric sensation grossly intact upper and lower.  Neg finger-nose. Neg heel-shin.        LABS  Results for orders placed or performed during the hospital encounter of 02/28/25 (from the past 24 hours)   MR Brain w/o Contrast    Narrative    EXAM: MR BRAIN W/O CONTRAST  LOCATION: [facility]  DATE: 2/28/2025    INDICATION: Vertigo  COMPARISON: Head MRI 5/25/2012  TECHNIQUE: Routine multiplanar multisequence head MRI without intravenous contrast.    FINDINGS:  INTRACRANIAL CONTENTS: No acute or subacute infarct. Redemonstration of a dural based extra-axial mass with relatively low T2 signal most suggestive of a meningioma along the posterior aspect of the left petrous ridge. This measures 1.3 x 0.9 cm in   oblique transverse and AP dimensions on high-resolution T2-weighted images. This is not significantly changed compared to the previous exam from 2012. No significant impact upon adjacent structures. No new mass or extra-axial collection. Scattered   nonspecific T2/FLAIR  hyperintensities within the cerebral white matter most consistent with mild chronic microvascular ischemic change. Minor cerebral volume loss. No hydrocephalus. Normal position of the cerebellar tonsils.     SELLA: No abnormality accounting for technique.    OSSEOUS STRUCTURES/SOFT TISSUES: Normal marrow signal. The major intracranial vascular flow voids are maintained.     ORBITS: No abnormality accounting for technique.     SINUSES/MASTOIDS: Minor mucosal thickening involving ethmoid septa. No middle ear or mastoid effusion.       Impression    IMPRESSION:  1.  No evidence for acute intracranial process.  2.  1.3 x 0.9 cm presumed meningioma along the posterior margin of the left petrous ridge. This is similar to findings on the previous MRI from 5/25/2012.  3.  Background of minor brain atrophy and presumed chronic microvascular ischemic change. These are progressed compared to the 2012 exam.         RADIOLOGY  MR Brain w/o Contrast   Final Result   IMPRESSION:   1.  No evidence for acute intracranial process.   2.  1.3 x 0.9 cm presumed meningioma along the posterior margin of the left petrous ridge. This is similar to findings on the previous MRI from 5/25/2012.   3.  Background of minor brain atrophy and presumed chronic microvascular ischemic change. These are progressed compared to the 2012 exam.            MEDICATIONS GIVEN IN THE EMERGENCY:  Medications   meclizine (ANTIVERT) tablet 25 mg (25 mg Oral $Given 2/28/25 0718)   LORazepam (ATIVAN) tablet 0.5 mg (0.5 mg Oral $Given 2/28/25 0817)       NEW PRESCRIPTIONS STARTED AT TODAY'S ER VISIT  Discharge Medication List as of 2/28/2025  9:52 AM        START taking these medications    Details   meclizine (ANTIVERT) 25 MG tablet Take 1 tablet (25 mg) by mouth 3 times daily as needed for dizziness or nausea., Disp-30 tablet, R-0, E-Prescribe              José HERNANDEZ, am serving as a scribe to document services personally performed by Ricardo Vasques D.O.,  based on my observations and the provider's statements to me.  I, Ricardo Vasques D.O., attest that José Quiroz is acting in a scribe capacity, has observed my performance of the services and has documented them in accordance with my direction.     Ricardo Vasques D.O.  Emergency Medicine  [facility] EMERGENCY ROOM  [address] [address]  [phone]  Dept: [phone]       Ricardo Vasques,   02/28/25 1029

## 2025-03-14 ENCOUNTER — APPOINTMENT (OUTPATIENT)
Dept: LAB | Facility: CLINIC | Age: 80
End: 2025-03-14
Payer: COMMERCIAL

## 2025-03-17 ENCOUNTER — PATIENT OUTREACH (OUTPATIENT)
Dept: CARE COORDINATION | Facility: CLINIC | Age: 80
End: 2025-03-17
Payer: COMMERCIAL

## 2025-03-20 ENCOUNTER — TRANSFERRED RECORDS (OUTPATIENT)
Dept: MULTI SPECIALTY CLINIC | Facility: CLINIC | Age: 80
End: 2025-03-20

## 2025-03-20 LAB — RETINOPATHY: NORMAL

## 2025-03-23 DIAGNOSIS — K92.2 UPPER GI BLEEDING: ICD-10-CM

## 2025-03-24 RX ORDER — FERROUS SULFATE 325(65) MG
325 TABLET, DELAYED RELEASE (ENTERIC COATED) ORAL EVERY OTHER DAY
Qty: 45 TABLET | Refills: 1 | Status: SHIPPED | OUTPATIENT
Start: 2025-03-24

## 2025-06-28 ENCOUNTER — HEALTH MAINTENANCE LETTER (OUTPATIENT)
Age: 80
End: 2025-06-28

## 2025-07-30 ENCOUNTER — NURSE TRIAGE (OUTPATIENT)
Dept: PEDIATRICS | Facility: CLINIC | Age: 80
End: 2025-07-30
Payer: COMMERCIAL

## 2025-07-30 NOTE — TELEPHONE ENCOUNTER
Called pt and left VM to call back and speak with a nurse.    Upon call back:   - please triage ear lobe swelling/post ear piercing     Tequila Campbell RN

## 2025-07-30 NOTE — TELEPHONE ENCOUNTER
Nurse Triage SBAR    Is this a 2nd Level Triage? NO    Situation: patient reporting concern for ear symptoms    Background: states she pierced her ears about 6 weeks ago. States this morning she had noted swelling and redness to her right ear lobe. States she has been cleaning with solution the piercing salon gave her, but was touching the piercing without washing her hands each time. States she took earrings out this morning when she noted swelling as it caused pain.     Assessment: states swelling is only to right ear lobe, maybe twice normal size. States redness if only to lobe. Denies fever, pain, discharge and blood after removing piercing, change to hearing, and swollen lymph node.    Protocol Recommended Disposition:   No disposition on file.    Recommendation: per protocol advised of home cares, also offered visit it patient prefers. Scheduled for OV Friday 8//25 per patient request in case things do not improve with home cares. Instructed patient to call [phone] for new or worsening symptoms or further questions. Patient verbalized understanding and agrees with the plan.     Additional Information   Negative: Ear pain and entire lower ear is red or swollen   Negative: Ear pain and entire upper ear is red or swollen   Negative: Ear pain and fever   Negative: Earring tore completely through the earlobe   Negative: Skin is split open or gaping (or length > 1/4 inch or 6 mm)   Negative: Bleeding at the piercing site has not stopped after 10 minutes of direct pressure   Negative: Part of jewelry (clasp) is stuck inside the earlobe   Negative: Patient sounds very sick or weak to the triager   Negative: Swollen lymph node (in front of or behind earlobe)   Negative: Symptoms of minor pierced ear infection (e.g., localized redness just at earring site, slight discharge), and not improving 3 days following CARE ADVICE (e.g., cleaning, antibiotic ointment)   Negative: Small tear in earlobe from earring injury and  "no tetanus booster > 10 years   Negative: Patient wants to be seen   Negative: Large thick scar has developed at the earring site over the last couple months   Symptoms of minor pierced ear infection (e.g., localized redness just at earring site, slight discharge)    Answer Assessment - Initial Assessment Questions  1. LOCATION: \"Where is the wound located?\"       Right ear  2. WOUND APPEARANCE: \"What does the wound look like?\"       Red and swollen twice the size  3. SIZE: If redness is present, ask: \"What is the size of the red area?\" (Inches, centimeters, or compare to size of a coin)       Redness to lobe only  4. SPREAD: \"What's changed in the last day?\"  \"Do you see any red streaks coming from the wound?\"      no  5. ONSET: \"When did it start to look infected?\"       This morning  6. MECHANISM: \"How did the wound start, what was the cause?\"      Ear piercing  7. PAIN: \"Is there any pain?\" If Yes, ask: \"How bad is the pain?\"   (Scale 1-10; or mild, moderate, severe)      No pain  8. FEVER: \"Do you have a fever?\" If Yes, ask: \"What is your temperature, how was it measured, and when did it start?\"      no  9. OTHER SYMPTOMS: \"Do you have any other symptoms?\" (e.g., shaking chills, weakness, rash elsewhere on body)      No fever, hearing changes  10. PREGNANCY: \"Is there any chance you are pregnant?\" \"When was your last menstrual period?\"        NA    Protocols used: Wound Infection-A-OH, Ear Piercing-A-OH  Mihaela Cronin RN   "

## 2025-08-01 ENCOUNTER — OFFICE VISIT (OUTPATIENT)
Dept: PEDIATRICS | Facility: CLINIC | Age: 80
End: 2025-08-01
Payer: COMMERCIAL

## 2025-08-01 VITALS
TEMPERATURE: 97.8 F | RESPIRATION RATE: 14 BRPM | BODY MASS INDEX: 24.47 KG/M2 | DIASTOLIC BLOOD PRESSURE: 60 MMHG | WEIGHT: 127.1 LBS | SYSTOLIC BLOOD PRESSURE: 122 MMHG | OXYGEN SATURATION: 97 % | HEART RATE: 72 BPM

## 2025-08-01 DIAGNOSIS — H61.001 PERICHONDRITIS OF RIGHT PINNA: Primary | ICD-10-CM

## 2025-08-01 PROCEDURE — 1126F AMNT PAIN NOTED NONE PRSNT: CPT | Performed by: PHYSICIAN ASSISTANT

## 2025-08-01 PROCEDURE — 3074F SYST BP LT 130 MM HG: CPT | Performed by: PHYSICIAN ASSISTANT

## 2025-08-01 PROCEDURE — 99213 OFFICE O/P EST LOW 20 MIN: CPT | Performed by: PHYSICIAN ASSISTANT

## 2025-08-01 PROCEDURE — G2211 COMPLEX E/M VISIT ADD ON: HCPCS | Performed by: PHYSICIAN ASSISTANT

## 2025-08-01 PROCEDURE — 3078F DIAST BP <80 MM HG: CPT | Performed by: PHYSICIAN ASSISTANT

## 2025-08-01 RX ORDER — MUPIROCIN 2 %
OINTMENT (GRAM) TOPICAL 3 TIMES DAILY
Qty: 30 G | Refills: 0 | Status: SHIPPED | OUTPATIENT
Start: 2025-08-01 | End: 2025-08-08

## 2025-08-01 RX ORDER — CIPROFLOXACIN 500 MG/1
500 TABLET, FILM COATED ORAL 2 TIMES DAILY
Qty: 10 TABLET | Refills: 0 | Status: SHIPPED | OUTPATIENT
Start: 2025-08-01 | End: 2025-08-06

## 2025-08-01 ASSESSMENT — PAIN SCALES - GENERAL: PAINLEVEL_OUTOF10: NO PAIN (0)

## 2025-08-06 ENCOUNTER — HOSPITAL ENCOUNTER (OUTPATIENT)
Dept: MAMMOGRAPHY | Facility: CLINIC | Age: 80
Discharge: HOME | End: 2025-08-06
Attending: INTERNAL MEDICINE
Payer: COMMERCIAL

## 2025-08-06 DIAGNOSIS — Z12.31 ENCOUNTER FOR SCREENING MAMMOGRAM FOR BREAST CANCER: ICD-10-CM

## 2025-08-06 PROCEDURE — 77067 SCR MAMMO BI INCL CAD: CPT

## 2025-08-26 ENCOUNTER — DOCUMENTATION ONLY (OUTPATIENT)
Dept: LAB | Facility: CLINIC | Age: 80
End: 2025-08-26
Payer: COMMERCIAL

## 2025-08-26 DIAGNOSIS — E11.9 TYPE 2 DIABETES MELLITUS WITHOUT COMPLICATION, WITHOUT LONG-TERM CURRENT USE OF INSULIN (H): Primary | ICD-10-CM

## (undated) DEVICE — SUCTION MANIFOLD NEPTUNE 2 SYS 1 PORT 702-025-000

## (undated) DEVICE — TUBING SUCTION MEDI-VAC 1/4"X20' N620A - HE

## (undated) DEVICE — FORCEP BIOPSY 2.3MM DISP COATED 000388

## (undated) DEVICE — SOL WATER IRRIG 1000ML BOTTLE 2F7114